# Patient Record
Sex: MALE | Race: WHITE | NOT HISPANIC OR LATINO | Employment: OTHER | URBAN - METROPOLITAN AREA
[De-identification: names, ages, dates, MRNs, and addresses within clinical notes are randomized per-mention and may not be internally consistent; named-entity substitution may affect disease eponyms.]

---

## 2018-06-15 ENCOUNTER — CONVERSION ENCOUNTER (OUTPATIENT)
Dept: OTHER | Facility: HOSPITAL | Age: 63
End: 2018-06-15

## 2018-06-15 ENCOUNTER — OFFICE VISIT CONVERTED (OUTPATIENT)
Dept: CARDIOLOGY | Facility: CLINIC | Age: 63
End: 2018-06-15
Attending: SPECIALIST

## 2018-06-19 ENCOUNTER — CONVERSION ENCOUNTER (OUTPATIENT)
Dept: CARDIOLOGY | Facility: CLINIC | Age: 63
End: 2018-06-19
Attending: SPECIALIST

## 2018-11-20 ENCOUNTER — HOSPITAL ENCOUNTER (OUTPATIENT)
Dept: PREADMISSION TESTING | Facility: HOSPITAL | Age: 63
Discharge: HOME | End: 2018-11-20
Attending: UROLOGY | Admitting: UROLOGY

## 2018-11-20 LAB
ANION GAP SERPL CALC-SCNC: 13.4 MMOL/L (ref 10–20)
BASOPHILS # BLD AUTO: 0 10*3/UL (ref 0–0.2)
BASOPHILS NFR BLD AUTO: 1 % (ref 0–2)
BUN SERPL-MCNC: 15 MG/DL (ref 8–20)
BUN/CREAT SERPL: 13.6 (ref 6.2–20.3)
CALCIUM SERPL-MCNC: 9.2 MG/DL (ref 8.9–10.3)
CHLORIDE SERPL-SCNC: 99 MMOL/L (ref 101–111)
CONV CO2: 26 MMOL/L (ref 22–32)
CREAT UR-MCNC: 1.1 MG/DL (ref 0.7–1.2)
DIFFERENTIAL METHOD BLD: (no result)
EOSINOPHIL # BLD AUTO: 0.3 10*3/UL (ref 0–0.3)
EOSINOPHIL # BLD AUTO: 4 % (ref 0–3)
ERYTHROCYTE [DISTWIDTH] IN BLOOD BY AUTOMATED COUNT: 14.1 % (ref 11.5–14.5)
GLUCOSE SERPL-MCNC: 95 MG/DL (ref 65–99)
HCT VFR BLD AUTO: 48.5 % (ref 40–54)
HGB BLD-MCNC: 16.3 G/DL (ref 14–18)
LYMPHOCYTES # BLD AUTO: 2.1 10*3/UL (ref 0.8–4.8)
LYMPHOCYTES NFR BLD AUTO: 27 % (ref 18–42)
MCH RBC QN AUTO: 32.5 PG (ref 26–32)
MCHC RBC AUTO-ENTMCNC: 33.6 G/DL (ref 32–36)
MCV RBC AUTO: 96.7 FL (ref 80–94)
MONOCYTES # BLD AUTO: 0.8 10*3/UL (ref 0.1–1.3)
MONOCYTES NFR BLD AUTO: 10 % (ref 2–11)
NEUTROPHILS # BLD AUTO: 4.7 10*3/UL (ref 2.3–8.6)
NEUTROPHILS NFR BLD AUTO: 58 % (ref 50–75)
NRBC BLD AUTO-RTO: 0 /100{WBCS}
NRBC/RBC NFR BLD MANUAL: 0 10*3/UL
PLATELET # BLD AUTO: 209 10*3/UL (ref 150–450)
PMV BLD AUTO: 9.4 FL (ref 7.4–10.4)
POTASSIUM SERPL-SCNC: 4.4 MMOL/L (ref 3.6–5.1)
RBC # BLD AUTO: 5.02 10*6/UL (ref 4.6–6)
SODIUM SERPL-SCNC: 134 MMOL/L (ref 136–144)
WBC # BLD AUTO: 8 10*3/UL (ref 4.5–11.5)

## 2019-01-25 ENCOUNTER — CONVERSION ENCOUNTER (OUTPATIENT)
Dept: OTHER | Facility: HOSPITAL | Age: 64
End: 2019-01-25

## 2019-01-25 ENCOUNTER — OFFICE VISIT CONVERTED (OUTPATIENT)
Dept: CARDIOLOGY | Facility: CLINIC | Age: 64
End: 2019-01-25
Attending: SPECIALIST

## 2019-07-02 ENCOUNTER — HOSPITAL ENCOUNTER (OUTPATIENT)
Dept: OTHER | Facility: HOSPITAL | Age: 64
Discharge: HOME | End: 2019-07-02
Attending: FAMILY MEDICINE

## 2019-08-09 ENCOUNTER — OFFICE VISIT CONVERTED (OUTPATIENT)
Dept: CARDIOLOGY | Facility: CLINIC | Age: 64
End: 2019-08-09
Attending: SPECIALIST

## 2019-08-09 ENCOUNTER — CONVERSION ENCOUNTER (OUTPATIENT)
Dept: CARDIOLOGY | Facility: CLINIC | Age: 64
End: 2019-08-09

## 2019-08-16 ENCOUNTER — HOSPITAL ENCOUNTER (OUTPATIENT)
Dept: CARDIOLOGY | Facility: HOSPITAL | Age: 64
Discharge: HOME | End: 2019-08-16
Attending: SPECIALIST

## 2019-10-10 ENCOUNTER — HOSPITAL ENCOUNTER (OUTPATIENT)
Dept: CT IMAGING | Facility: HOSPITAL | Age: 64
Discharge: HOME | End: 2019-10-10
Attending: SPECIALIST

## 2019-10-10 LAB
CREAT BLD-MCNC: 1.2 MG/DL (ref 0.6–1.4)
GFR SERPLBLD BASED ON 1.73 SQ M-ARVRAT: >60 ML/MIN/{1.73_M2}

## 2019-10-14 ENCOUNTER — HOSPITAL ENCOUNTER (OUTPATIENT)
Dept: CARDIOLOGY | Facility: HOSPITAL | Age: 64
Discharge: HOME | End: 2019-10-14
Attending: SURGERY

## 2019-12-09 ENCOUNTER — HOSPITAL ENCOUNTER (OUTPATIENT)
Dept: CARDIOLOGY | Facility: HOSPITAL | Age: 64
Discharge: HOME | End: 2019-12-09
Attending: SURGERY

## 2020-03-06 ENCOUNTER — CONVERSION ENCOUNTER (OUTPATIENT)
Dept: OTHER | Facility: HOSPITAL | Age: 65
End: 2020-03-06

## 2020-03-06 ENCOUNTER — OFFICE VISIT CONVERTED (OUTPATIENT)
Dept: CARDIOLOGY | Facility: CLINIC | Age: 65
End: 2020-03-06
Attending: SPECIALIST

## 2020-04-21 ENCOUNTER — TELEPHONE CONVERTED (OUTPATIENT)
Dept: GASTROENTEROLOGY | Facility: CLINIC | Age: 65
End: 2020-04-21
Attending: NURSE PRACTITIONER

## 2020-07-06 ENCOUNTER — HOSPITAL ENCOUNTER (OUTPATIENT)
Dept: LAB | Facility: HOSPITAL | Age: 65
Discharge: HOME | End: 2020-07-06
Attending: NURSE PRACTITIONER

## 2020-07-06 LAB
BASOPHILS # BLD AUTO: 0.07 10*3/UL (ref 0–0.2)
BASOPHILS NFR BLD AUTO: 0.8 % (ref 0–3)
CONV ABS IMM GRAN: 0.03 10*3/UL (ref 0–0.2)
CONV IMMATURE GRAN: 0.3 % (ref 0–1.8)
DEPRECATED RDW RBC AUTO: 48.7 FL (ref 35.1–43.9)
EOSINOPHIL # BLD AUTO: 0.46 10*3/UL (ref 0–0.7)
EOSINOPHIL # BLD AUTO: 5 % (ref 0–7)
ERYTHROCYTE [DISTWIDTH] IN BLOOD BY AUTOMATED COUNT: 14.3 % (ref 11.6–14.4)
HCT VFR BLD AUTO: 50.5 % (ref 42–52)
HGB BLD-MCNC: 16.9 G/DL (ref 14–18)
LYMPHOCYTES # BLD AUTO: 2.7 10*3/UL (ref 1–5)
LYMPHOCYTES NFR BLD AUTO: 29.4 % (ref 20–45)
MCH RBC QN AUTO: 31.1 PG (ref 27–31)
MCHC RBC AUTO-ENTMCNC: 33.5 G/DL (ref 33–37)
MCV RBC AUTO: 93 FL (ref 80–96)
MONOCYTES # BLD AUTO: 0.85 10*3/UL (ref 0.2–1.2)
MONOCYTES NFR BLD AUTO: 9.3 % (ref 3–10)
NEUTROPHILS # BLD AUTO: 5.06 10*3/UL (ref 2–8)
NEUTROPHILS NFR BLD AUTO: 55.2 % (ref 30–85)
NRBC CBCN: 0 % (ref 0–0.7)
PLATELET # BLD AUTO: 192 10*3/UL (ref 130–400)
PMV BLD AUTO: 12.5 FL (ref 9.4–12.4)
RBC # BLD AUTO: 5.43 10*6/UL (ref 4.7–6.1)
WBC # BLD AUTO: 9.17 10*3/UL (ref 4.8–10.8)

## 2020-07-08 LAB — BACTERIA SPEC AEROBE CULT: NORMAL

## 2020-07-17 ENCOUNTER — HOSPITAL ENCOUNTER (OUTPATIENT)
Dept: PREADMISSION TESTING | Facility: HOSPITAL | Age: 65
Discharge: HOME | End: 2020-07-17
Attending: INTERNAL MEDICINE

## 2020-07-18 LAB — SARS-COV-2 RNA SPEC QL NAA+PROBE: NOT DETECTED

## 2020-07-22 ENCOUNTER — HOSPITAL ENCOUNTER (OUTPATIENT)
Dept: GASTROENTEROLOGY | Facility: HOSPITAL | Age: 65
Setting detail: HOSPITAL OUTPATIENT SURGERY
Discharge: HOME | End: 2020-07-22
Attending: INTERNAL MEDICINE

## 2021-01-08 ENCOUNTER — OFFICE VISIT CONVERTED (OUTPATIENT)
Dept: CARDIOLOGY | Facility: CLINIC | Age: 66
End: 2021-01-08
Attending: SPECIALIST

## 2021-05-12 NOTE — PROGRESS NOTES
"   Quick Note      Patient Name: Yehuda Weaver   Patient ID: [id number]   Sex: Male   YOB: 1955    Primary Care Provider: Merna MOORE   Referring Provider: Alva MOORE    Visit Date: April 21, 2020    Provider: OSCAR Grossman   Location: [facility]   Location Address: [address] [place] [address]  [id number]   Location Phone: [phone]          History Of Present Illness  TELEHEALTH TELEPHONE VISIT  Chief Complaint: Pt states it feels like there is something stuck in his throat. He states he has had his throat stretched. He states at night his sinuses drain. Dr. Hernandez sent him to a ent and was told he couldn't find a cause for concern.   Yehuda Weaver is a 64 year old /White male who is presenting for evaluation via telehealth telephone visit. Verbal consent obtained before beginning visit.   Provider spent 16 minutes with the patient during telehealth visit.   The following staff were present during this visit: Leandra Pack MA, Shannan Hall MA   Past Medical History/Overview of Patient Symptoms     Due to the national emergency of COVID-19, all visits are being performed via telehealth where possible.    New pt w c/o feeling as if there's a lump in his throat and it feels sore, states he saw ENT and meds adjusted but no help. States feels \"sore\" when he pushes on throat, states he had an US of neck and negative, states he gets relief with hot water in shower on neck. Pt denies dysphagia w pills, solids, or liquids. Denies pain w swallowing except if he drinks a large drink of water this can be painful. Denies HB or indigestion, except he feels if he eats late or lays down after eating. Noted pt taking Omeprazole 40 mg/day, has been on x 2 yrs and prior to that was on Protonix.  No unint wt loss.   Pt states his bowels are moving normally, he states his stool has been darker than normal the last 2 days-+black. Denies NSAIDs, but on " Rx  mg/day. Sees Dr Nicole.    Last EGD colonoscopy with Dr. Castano October 2015 showed hiatal hernia, Schatzki's ring, 2 small polyps removedadenomatous.           Assessment  · Melena     578.1/K92.1  · Globus sensation     306.4/R09.89  · History of colon polyps     V12.72/Z86.010  · Heartburn     787.1/R12      Plan  · Orders  o CBC with Auto Diff [facility] (04051) - - 04/21/2020  o Physician Telephone Evaluation, 11-20 minutes (70996) - - 04/21/2020  o Stool occult blood screen by immunoassay (15087) - - 04/21/2020  · Medications  o NuLYTELY with Flavor Packs 420 gram oral recon soln   SIG: take as directed for 1 day per office instructions   DISP: (1) 4000 ml bottle with 0 refills  Prescribed on 04/21/2020     o Medications have been Reconciled  o Transition of Care or Provider Policy  · Instructions  o Please Sign Permit for: EGD/COLONOSCOPY Indication: persist HB, globus sensation, last colon 2015 + polyps Surgical Risk and Benefits: Possible risks/complications, benefits, and alternatives to surgical or invasive procedure have been explained to patient and/or legal guardian; Patient has been evaluated and can tolerate anesthesia and/or sedation. Risks, benefits, and alternatives to anesthesia and sedation have been explained to patient and/or legal guardian. Cardiac Clearance Dr Nicole  o small frequent meals r/w pt, NPO 4 hrs before HS            Electronically Signed by: OSCAR Grossman -Author on April 21, 2020 02:17:28 PM

## 2021-05-14 VITALS
DIASTOLIC BLOOD PRESSURE: 79 MMHG | HEIGHT: 67 IN | BODY MASS INDEX: 31.39 KG/M2 | WEIGHT: 200 LBS | HEART RATE: 69 BPM | SYSTOLIC BLOOD PRESSURE: 127 MMHG

## 2021-05-14 NOTE — PROGRESS NOTES
"   Progress Note      Patient Name: Yehuda Weaver   Patient ID: [id number]   Sex: Male   YOB: 1955    Primary Care Provider: Koby Hernandez MD    Visit Date: January 8, 2021    Provider: Rodolfo Nicole MD   Location: [facility]   Location Address: [address] [place] [address]  [id number]   Location Phone: [phone]          Chief Complaint     Coronary artery disease.  Hypertension.  Musculoskeletal chest pain.       History Of Present Illness  Yehuda Weaver is a 65 year old /White male with a history of coronary artery disease and history of coronary artery bypass graft surgery with nonspecific chest pain, appears to be musculoskeletal. No shortness of breath. No PND. No orthopnea.   CURRENT MEDICATIONS: Cartia  mg daily; atorvastatin 80 mg daily; metoprolol 100 mg daily; lisinopril-hydrochlorothiazide 20-25 mg daily; doxazosin 4 mg daily; aspirin 325 mg daily; omeprazole 40 mg daily; fish oil 1200 mg b.i.d.; multivitamin daily; montelukast 10 mg daily.   PAST MEDICAL HISTORY: Coronary artery disease status post CABG; Hyperlipidemia; Hypertension; Peripheral vascular disease.   PSYCHOSOCIAL HISTORY: Current smoker. Denies alcohol use.      ALLERGIES:  Penicillin.       Review of Systems  · Cardiovascular  o Admits  o : shortness of breath while walking or lying flat, chest pain or angina pectoris   o Denies  o : palpitations (fast, fluttering, or skipping beats), swelling (feet, ankles, hands)  · Respiratory  o Denies  o : chronic or frequent cough, asthma or wheezing      Vitals  Date Time BP Position Site L\R Cuff Size HR RR TEMP (F) WT  HT  BMI kg/m2 BSA m2 O2 Sat FR L/min FiO2 HC       01/08/2021 12:05 /79 Sitting    69 - R   200lbs 0oz 5'  7\" 31.32 2.07             Physical Examination  · Constitutional  o Appearance  o : Awake, alert, cooperative, pleasant.  · Respiratory  o Inspection of Chest  o : No chest wall deformities, moving " equal.  o Auscultation of Lungs  o : Good air entry with vesicular breath sounds.  · Cardiovascular  o Heart  o :   § Auscultation of Heart  § : S1 and S2 regular. No S3. No S4.   o Peripheral Vascular System  o :   § Extremities  § : Peripheral pulses were well felt. No edema. No cyanosis.  · Gastrointestinal  o Abdominal Examination  o : No masses or organomegaly noted.          Assessment     ASSESSMENT & PLAN:    1.  Coronary artery disease status post coronary artery bypass graft surgery, stable.  Continue aspirin.  2.  Essential hypertension, controlled.  Continue lisinopril and metoprolol.  3.  Hyperlipidemia.  Continue Lipitor.  Managed by Dr. Hernandez.    4.  Positive nicotine use.  Smoking cessation instructions were discussed with the patient.  5.  Peripheral vascular disease.  Managed by his vascular surgeon.  6.  See me back in 6 months.             Electronically Signed by: Nely Huerta-, Other -Author on January 11, 2021 11:30:27 AM  Electronically Co-signed by: Rodolfo Nicole MD -Reviewer on January 11, 2021 12:15:24 PM

## 2021-05-15 VITALS
HEART RATE: 72 BPM | DIASTOLIC BLOOD PRESSURE: 75 MMHG | BODY MASS INDEX: 29.03 KG/M2 | WEIGHT: 185 LBS | SYSTOLIC BLOOD PRESSURE: 130 MMHG | HEIGHT: 67 IN

## 2021-05-15 VITALS
SYSTOLIC BLOOD PRESSURE: 126 MMHG | DIASTOLIC BLOOD PRESSURE: 74 MMHG | HEART RATE: 74 BPM | WEIGHT: 19 LBS | HEIGHT: 67 IN | BODY MASS INDEX: 2.98 KG/M2

## 2021-05-15 VITALS
DIASTOLIC BLOOD PRESSURE: 74 MMHG | SYSTOLIC BLOOD PRESSURE: 125 MMHG | HEIGHT: 67 IN | HEART RATE: 72 BPM | BODY MASS INDEX: 29.66 KG/M2 | WEIGHT: 189 LBS

## 2021-05-16 VITALS
BODY MASS INDEX: 28.88 KG/M2 | DIASTOLIC BLOOD PRESSURE: 88 MMHG | WEIGHT: 184 LBS | HEART RATE: 67 BPM | HEIGHT: 67 IN | SYSTOLIC BLOOD PRESSURE: 137 MMHG

## 2021-08-12 NOTE — PROGRESS NOTES
[facility]  Cardiology progress Note    Patient Name: Yehuda Weaver  : 1955    CHIEF COMPLAINT  Coronary artery disease   Peripheral vascular disease       Subjective   Subjective     HISTORY OF PRESENT ILLNESS    Yehuda Weaver is a 66 y.o. male with history of coronary disease status post coronary bypass graft surgery.  Denies any chest pain.  Has bilateral peripheral vascular disease.  He has recent stenting in his iliacs.  He is claudication pain is improved.    Review of Systems:   Constitutional no fever,  no weight loss   Skin no rash   Otolaryngeal no difficulty swallowing   Cardiovascular See HPI   Pulmonary no cough, no sputum production   Gastrointestinal no constipation, no diarrhea   Genitourinary no dysuria, no hematuria   Hematologic no easy bruisability, no abnormal bleeding   Musculoskeletal no muscle pain   Neurologic no dizziness, no falls         Personal History     Social History:  reports that he has been smoking. He has never used smokeless tobacco. He reports previous alcohol use. He reports that he does not use drugs.    Home Medications:  Current Outpatient Medications on File Prior to Visit   Medication Sig   • aspirin 81 MG chewable tablet Chew 81 mg Daily.   • atorvastatin (LIPITOR) 80 MG tablet Daily.   • clopidogrel (PLAVIX) 75 MG tablet Daily.   • dilTIAZem (TIAZAC) 240 MG 24 hr capsule Daily.   • doxazosin (CARDURA) 4 MG tablet    • lisinopril-hydrochlorothiazide (PRINZIDE,ZESTORETIC) 20-25 MG per tablet    • metoprolol succinate XL (TOPROL-XL) 100 MG 24 hr tablet Daily.   • montelukast (SINGULAIR) 10 MG tablet Daily.   • omeprazole (priLOSEC) 40 MG capsule omeprazole 40 mg oral capsule,delayed release(DR/EC) take 1 capsule (40 mg) by oral route once daily before a meal in combination with clarithromycin   Active     No current facility-administered medications on file prior to visit.     Allergies:  Allergies   Allergen Reactions   • Penicillins Unknown - Low  Severity       Objective    Objective       Vitals:   Heart Rate:  [70] 70  BP: (130)/(70) 130/70  Body mass index is 30.54 kg/m².     Physical Exam:   Constitutional: Awake, alert, No acute distress    Eyes: PERRLA, sclerae anicteric, no conjunctival injection   HENT: NCAT, mucous membranes moist   Neck: Supple, no thyromegaly, no lymphadenopathy, trachea midline   Respiratory: Clear to auscultation bilaterally, nonlabored respirations    Cardiovascular: RRR, no murmurs or rubs. Palpable pedal pulses bilaterally   Gastrointestinal: Positive bowel sounds, soft, nontender, nondistended   Musculoskeletal: No bilateral ankle edema, no cyanosis to extremities   Psychiatric: Appropriate affect, cooperative   Neurologic: Oriented x 3, strength symmetric in all extremities, Cranial Nerves grossly intact to confrontation, speech clear   Skin: No rashes.    Result Review    Result Review:  I have personally reviewed the available results from  [x]  Laboratory  [x]  EKG  [x]  Cardiology  [x]  Medications  [x]  Old records  []  Other:   Procedures      Impression/Plan:  1.  Coronary artery disease, s/p CABG surgery, stable:  Recent stress test was negative.  Continue current dose of aspirin and Metoprolol.  2.  Essential hypertension controlled:  Continue Lisinopril.  3.  Hyperlipidemia:  Continue Lipitor.  4.  Peripheral vascular disease recent PTCA/stent:  Followed up by vascular surgeon in [place].  Continue aspirin and Plavix.  5.  Positive for nicotine use:  Smoking-cessation instructions were discussed with the patient.

## 2021-08-13 ENCOUNTER — OFFICE VISIT (OUTPATIENT)
Dept: CARDIOLOGY | Facility: CLINIC | Age: 66
End: 2021-08-13

## 2021-08-13 VITALS
WEIGHT: 195 LBS | HEIGHT: 67 IN | HEART RATE: 70 BPM | DIASTOLIC BLOOD PRESSURE: 70 MMHG | SYSTOLIC BLOOD PRESSURE: 130 MMHG | BODY MASS INDEX: 30.61 KG/M2

## 2021-08-13 DIAGNOSIS — Z95.1 HX OF CABG: ICD-10-CM

## 2021-08-13 DIAGNOSIS — I25.10 CORONARY ARTERY DISEASE INVOLVING NATIVE CORONARY ARTERY OF NATIVE HEART WITHOUT ANGINA PECTORIS: Primary | ICD-10-CM

## 2021-08-13 DIAGNOSIS — E78.2 HYPERLIPEMIA, MIXED: ICD-10-CM

## 2021-08-13 DIAGNOSIS — Z72.0 NICOTINE USE: ICD-10-CM

## 2021-08-13 PROCEDURE — 99214 OFFICE O/P EST MOD 30 MIN: CPT | Performed by: SPECIALIST

## 2021-08-13 RX ORDER — ATORVASTATIN CALCIUM 80 MG/1
80 TABLET, FILM COATED ORAL NIGHTLY
COMMUNITY
Start: 2021-07-14

## 2021-08-13 RX ORDER — CLOPIDOGREL BISULFATE 75 MG/1
TABLET ORAL DAILY
COMMUNITY
Start: 2021-05-28 | End: 2021-10-27

## 2021-08-13 RX ORDER — MONTELUKAST SODIUM 10 MG/1
10 TABLET ORAL NIGHTLY
COMMUNITY
Start: 2021-08-08

## 2021-08-13 RX ORDER — METOPROLOL SUCCINATE 100 MG/1
100 TABLET, EXTENDED RELEASE ORAL DAILY
COMMUNITY
Start: 2021-05-27

## 2021-08-13 RX ORDER — ASPIRIN 81 MG/1
81 TABLET, CHEWABLE ORAL DAILY
COMMUNITY
End: 2023-01-25

## 2021-08-13 RX ORDER — OMEPRAZOLE 40 MG/1
40 CAPSULE, DELAYED RELEASE ORAL DAILY
COMMUNITY

## 2021-08-13 RX ORDER — LISINOPRIL AND HYDROCHLOROTHIAZIDE 25; 20 MG/1; MG/1
1 TABLET ORAL DAILY
COMMUNITY
Start: 2021-08-08

## 2021-08-13 RX ORDER — DILTIAZEM HYDROCHLORIDE 240 MG/1
240 CAPSULE, EXTENDED RELEASE ORAL DAILY
COMMUNITY
Start: 2021-07-14

## 2021-08-13 RX ORDER — DOXAZOSIN MESYLATE 4 MG/1
4 TABLET ORAL NIGHTLY
COMMUNITY
Start: 2021-06-09

## 2021-10-27 ENCOUNTER — LAB (OUTPATIENT)
Dept: LAB | Facility: HOSPITAL | Age: 66
End: 2021-10-27

## 2021-10-27 ENCOUNTER — OFFICE VISIT (OUTPATIENT)
Dept: GASTROENTEROLOGY | Facility: CLINIC | Age: 66
End: 2021-10-27

## 2021-10-27 VITALS
BODY MASS INDEX: 31.33 KG/M2 | SYSTOLIC BLOOD PRESSURE: 146 MMHG | HEART RATE: 84 BPM | TEMPERATURE: 97.4 F | DIASTOLIC BLOOD PRESSURE: 77 MMHG | HEIGHT: 67 IN | WEIGHT: 199.6 LBS

## 2021-10-27 DIAGNOSIS — R10.32 LLQ PAIN: Primary | ICD-10-CM

## 2021-10-27 DIAGNOSIS — K59.00 CONSTIPATION, UNSPECIFIED CONSTIPATION TYPE: ICD-10-CM

## 2021-10-27 DIAGNOSIS — R10.32 LLQ PAIN: ICD-10-CM

## 2021-10-27 DIAGNOSIS — K57.90 DIVERTICULOSIS: ICD-10-CM

## 2021-10-27 PROBLEM — I10 HYPERTENSION: Status: ACTIVE | Noted: 2021-10-27

## 2021-10-27 PROBLEM — K57.92 DIVERTICULITIS: Status: ACTIVE | Noted: 2021-10-27

## 2021-10-27 PROBLEM — E78.5 HYPERLIPIDEMIA: Status: ACTIVE | Noted: 2021-10-27

## 2021-10-27 PROBLEM — J44.9 CHRONIC OBSTRUCTIVE PULMONARY DISEASE (HCC): Status: ACTIVE | Noted: 2021-10-27

## 2021-10-27 PROBLEM — I25.10 CORONARY ARTERY DISEASE: Status: ACTIVE | Noted: 2021-10-27

## 2021-10-27 LAB
DEPRECATED RDW RBC AUTO: 44.7 FL (ref 37–54)
ERYTHROCYTE [DISTWIDTH] IN BLOOD BY AUTOMATED COUNT: 13.1 % (ref 12.3–15.4)
HCT VFR BLD AUTO: 49.7 % (ref 37.5–51)
HGB BLD-MCNC: 16.1 G/DL (ref 13–17.7)
MCH RBC QN AUTO: 30 PG (ref 26.6–33)
MCHC RBC AUTO-ENTMCNC: 32.4 G/DL (ref 31.5–35.7)
MCV RBC AUTO: 92.6 FL (ref 79–97)
PLATELET # BLD AUTO: 201 10*3/MM3 (ref 140–450)
PMV BLD AUTO: 12.7 FL (ref 6–12)
RBC # BLD AUTO: 5.37 10*6/MM3 (ref 4.14–5.8)
WBC # BLD AUTO: 8.28 10*3/MM3 (ref 3.4–10.8)

## 2021-10-27 PROCEDURE — 36415 COLL VENOUS BLD VENIPUNCTURE: CPT

## 2021-10-27 PROCEDURE — 85027 COMPLETE CBC AUTOMATED: CPT

## 2021-10-27 PROCEDURE — 99214 OFFICE O/P EST MOD 30 MIN: CPT | Performed by: NURSE PRACTITIONER

## 2021-10-27 RX ORDER — FLUTICASONE PROPIONATE 50 MCG
1 SPRAY, SUSPENSION (ML) NASAL NIGHTLY
COMMUNITY
Start: 2021-09-15

## 2021-10-27 RX ORDER — CHLORAL HYDRATE 500 MG
1000 CAPSULE ORAL
COMMUNITY

## 2021-10-27 RX ORDER — DIPHENOXYLATE HYDROCHLORIDE AND ATROPINE SULFATE 2.5; .025 MG/1; MG/1
1 TABLET ORAL DAILY
COMMUNITY

## 2021-10-27 NOTE — PATIENT INSTRUCTIONS
Miralax this week, then start Benefiber and colace daily-2xday. Call in 10 days w update, if no improvement then repeat scope.

## 2021-10-27 NOTE — PROGRESS NOTES
Patient Name: Yehuda Weaver   Visit Date: 10/27/2021   Patient ID: [id number]  Provider: OSCAR Argueta    Sex: male  Location:  Location Address:  Location Phone: [phone] [place] [address] [phone]    YOB: 1955  Age: 66 y.o.   Primary Care Provider Koby Hernandez MD      Referring Provider: No ref. provider found        Chief Complaint  LLQ pain (Pt states since he had scope last year and he has been having abd pain), Constipation (Pt states he does not have bm daily), and Diarrhea (Pt states he may have 2 bm in the am)    History of Present Illness  Patient initially presented April 2020 with globus sensation.  EGD colonoscopy 7/22/2020: Small hiatal hernia, normal esophagus-biopsy negative, normal stomach and duodenum, good prep, diverticula in the sigmoid colon, 3 mm benign polyp in the descending colon, grade 1 internal hemorrhoids    Pt states globus sensation has resolved. Pt has not been seen since initial visit/scopes. No HB w Prilosec.  Pt has been having LLQ pain started 2 weeks after colonoscopy 7/2020. Saw urology, in [place], states had CT 2-3 months ago and was normal. Pt states he's having small stools, sometimes runny, and sometimes normal. [place] #4 usually, but currently #2-3 and has BM most days. Has not tried any meds. No blood in stool, no black stool.   Cardio - Dr Nicole  Past Medical History:   Diagnosis Date   • Bladder cancer (HCC) 2004   • Coronary artery disease    • Diverticulitis    • Heart attack (HCC)    • Hyperlipidemia    • Hypertension        Past Surgical History:   Procedure Laterality Date   • BACK SURGERY     • COLONOSCOPY  07/22/2020    Dr. Castano   • CORONARY ARTERY BYPASS GRAFT  2007    Triple coronary bypass   • HERNIA REPAIR     • PROSTATE SURGERY     • UPPER GASTROINTESTINAL ENDOSCOPY  07/22/2020    Dr. Castano       Allergies   Allergen Reactions   • Penicillins Unknown - Low Severity       Family History   Problem Relation  "Age of Onset   • Lung cancer Brother         Unsure of age   • Heart failure Mother    • Clotting disorder Father    • Heart attack Father    • Colon cancer Neg Hx         Social History     Tobacco Use   • Smoking status: Current Every Day Smoker   • Smokeless tobacco: Never Used   Vaping Use   • Vaping Use: Never used   Substance Use Topics   • Alcohol use: Not Currently   • Drug use: Never       Objective     Vital Signs:   /77 (BP Location: Left arm, Patient Position: Sitting, Cuff Size: Adult)   Pulse 84   Temp 97.4 °F (36.3 °C) (Temporal)   Ht 170.2 cm (67\")   Wt 90.5 kg (199 lb 9.6 oz)   BMI 31.26 kg/m²       Physical Exam  Constitutional:       General: The patient is not in acute distress.     Appearance: Normal appearance.   HENT:      Head: Normocephalic and atraumatic.      Nose: Nose normal.   Pulmonary:      Effort: Pulmonary effort is normal. No respiratory distress.   Abdominal:      General: Abdomen is flat.      Palpations: Abdomen is soft. There is no mass.      Tenderness: There is no abdominal tenderness except TENDER IN LLQ. There is no guarding.   Musculoskeletal:      Cervical back: Neck supple.      Right lower leg: No edema.      Left lower leg: No edema.   Skin:     General: Skin is warm and dry.   Neurological:      General: No focal deficit present.      Mental Status: The patient is alert and oriented to person, place, and time.      Gait: Gait normal.   Psychiatric:         Mood and Affect: Mood normal.         Speech: Speech normal.         Behavior: Behavior normal.         Thought Content: Thought content normal.     Result Review :   The following data was reviewed by: OSCAR Argueta on 10/27/2021:              Assessment and Plan    Diagnoses and all orders for this visit:    1. LLQ pain (Primary)  -     CBC (No Diff); Future            Follow Up      Check CBC today, requesting copy of CT scan  Miralax this week, then start Benefiber and colace " daily-2xday. Call in 10 days w update, if no improvement then repeat scope.  Discussed with patient long-term risk of PPI therapy, recommended decreasing dose today and patient was agreeable.  He will try to take every other day and gradually taper.     Patient was given instructions and counseling regarding his condition or for health maintenance advice. Please see specific information pulled into the AVS if appropriate.

## 2021-10-28 ENCOUNTER — TELEPHONE (OUTPATIENT)
Dept: GASTROENTEROLOGY | Facility: CLINIC | Age: 66
End: 2021-10-28

## 2021-10-28 NOTE — TELEPHONE ENCOUNTER
Called [facility] at [phone] and spoke to Parvin. Asked her to fax our office this patient's CT results to [phone].

## 2022-02-10 NOTE — PROGRESS NOTES
[facility]  Cardiology progress Note    Patient Name: Yehuda Weaver  : 1955    CHIEF COMPLAINT  Coronary artery disease.      Subjective   Subjective     HISTORY OF PRESENT ILLNESS    Yehuda Weaver is a 66 y.o. male with history of coronary s/p CABG.  No  shortness of breath.  He has peripheral vascular disease s/p stents.  He has some nonspecific nonexertional chest pain which lasts for a few minutes.    Review of Systems:   Constitutional no fever,  no weight loss   Skin no rash   Otolaryngeal no difficulty swallowing   Cardiovascular See HPI   Pulmonary no cough, no sputum production   Gastrointestinal no constipation, no diarrhea   Genitourinary no dysuria, no hematuria   Hematologic no easy bruisability, no abnormal bleeding   Musculoskeletal no muscle pain   Neurologic no dizziness, no falls          Personal History     Social History:  reports that he has been smoking. He has been smoking about 1.00 pack per day. He has never used smokeless tobacco. He reports previous alcohol use. He reports that he does not use drugs.    Home Medications:  Current Outpatient Medications on File Prior to Visit   Medication Sig   • aspirin 81 MG chewable tablet Chew 81 mg Daily.   • atorvastatin (LIPITOR) 80 MG tablet Daily.   • dilTIAZem (TIAZAC) 240 MG 24 hr capsule Daily.   • doxazosin (CARDURA) 4 MG tablet    • fluticasone (FLONASE) 50 MCG/ACT nasal spray    • lisinopril-hydrochlorothiazide (PRINZIDE,ZESTORETIC) 20-25 MG per tablet    • metoprolol succinate XL (TOPROL-XL) 100 MG 24 hr tablet Daily.   • montelukast (SINGULAIR) 10 MG tablet Daily.   • multivitamin (MULTI-VITAMIN DAILY PO)    • Omega-3 Fatty Acids (fish oil) 1000 MG capsule capsule    • omeprazole (priLOSEC) 40 MG capsule omeprazole 40 mg oral capsule,delayed release(DR/EC) take 1 capsule (40 mg) by oral route once daily before a meal in combination with clarithromycin   Active     No current facility-administered medications on file  prior to visit.     Allergies:  Allergies   Allergen Reactions   • Penicillins Unknown - Low Severity       Objective    Objective       Vitals:   Heart Rate:  [63] 63  BP: (139)/(74) 139/74  Body mass index is 29.6 kg/m².     Physical Exam:   Constitutional: Awake, alert, No acute distress    Eyes: PERRLA, sclerae anicteric, no conjunctival injection   HENT: NCAT, mucous membranes moist   Neck: Supple, no thyromegaly, no lymphadenopathy, trachea midline   Respiratory: Clear to auscultation bilaterally, nonlabored respirations    Cardiovascular: RRR, no murmurs or rubs. Palpable pedal pulses bilaterally   Musculoskeletal: No bilateral ankle edema, no cyanosis to extremities   Psychiatric: Appropriate affect, cooperative   Neurologic: Oriented x 3, strength symmetric in all extremities, Cranial Nerves grossly intact to confrontation, speech clear   Skin: No rashes.    Result Review    Result Review:  I have personally reviewed the available results from  [x]  Laboratory  [x]  EKG  [x]  Cardiology  [x]  Medications  [x]  Old records  []  Other:     ECG 12 Lead    Date/Time: 2/11/2022 10:25 AM  Performed by: Rodolfo Nicole MD  Authorized by: Rodolfo Nicole MD   Comparison: compared with previous ECG   Similar to previous ECG  Rhythm: sinus rhythm    Clinical impression: abnormal EKG  Comments: Normal sinus rhythm.  No significant changes compared to previous EKG.            Impression/Plan:  1.  Coronary artery s/p CABG stable: Continue aspirin 81 mg a day.  Continue Toprol- mg once a day.  2.  Essential hypertension controlled: Continue Prinzide 20/25 mg once a day.  Continue trazodone 40 mg a day.  Blood pressure controlled at home.  3.  Hyperlipidemia: Continue Lipitor 80 mg once a day.  4.  Peripheral vascular disease stable: Continue aspirin 81 mg a day.  5.  Atypical chest pain: In view of his previous history of coronary disease we will do a sestamibi stress test to evaluate for any significant  ischemia.  6.  Positive nicotine use: Smoking cessation discussed with patient.        Rodolfo Nicole MD   02/11/22   10:04 EST

## 2022-02-11 ENCOUNTER — OFFICE VISIT (OUTPATIENT)
Dept: CARDIOLOGY | Facility: CLINIC | Age: 67
End: 2022-02-11

## 2022-02-11 VITALS
HEIGHT: 67 IN | BODY MASS INDEX: 29.66 KG/M2 | SYSTOLIC BLOOD PRESSURE: 139 MMHG | DIASTOLIC BLOOD PRESSURE: 74 MMHG | WEIGHT: 189 LBS | HEART RATE: 63 BPM

## 2022-02-11 DIAGNOSIS — R07.9 CHEST PAIN, UNSPECIFIED TYPE: ICD-10-CM

## 2022-02-11 DIAGNOSIS — Z72.0 NICOTINE USE: ICD-10-CM

## 2022-02-11 DIAGNOSIS — Z95.1 HX OF CABG: ICD-10-CM

## 2022-02-11 DIAGNOSIS — E78.2 HYPERLIPEMIA, MIXED: ICD-10-CM

## 2022-02-11 DIAGNOSIS — I25.10 CORONARY ARTERY DISEASE INVOLVING NATIVE CORONARY ARTERY OF NATIVE HEART WITHOUT ANGINA PECTORIS: Primary | ICD-10-CM

## 2022-02-11 PROCEDURE — 99214 OFFICE O/P EST MOD 30 MIN: CPT | Performed by: SPECIALIST

## 2022-02-11 PROCEDURE — 93000 ELECTROCARDIOGRAM COMPLETE: CPT | Performed by: SPECIALIST

## 2022-02-11 NOTE — PATIENT INSTRUCTIONS
Managing the Challenge of Quitting Smoking  Quitting smoking is a physical and mental challenge. You will face cravings, withdrawal symptoms, and temptation. Before quitting, work with your health care provider to make a plan that can help you manage quitting. Preparation can help you quit and keep you from giving in.  How to manage lifestyle changes  Managing stress  Stress can make you want to smoke, and wanting to smoke may cause stress. It is important to find ways to manage your stress. You might try some of the following:  · Practice relaxation techniques.  ? Breathe slowly and deeply, in through your nose and out through your mouth.  ? Listen to music.  ? Soak in a bath or take a shower.  ? Imagine a peaceful place or vacation.  · Get some support.  ? Talk with family or friends about your stress.  ? Join a support group.  ? Talk with a counselor or therapist.  · Get some physical activity.  ? Go for a walk, run, or bike ride.  ? Play a favorite sport.  ? Practice yoga.    Medicines  Talk with your health care provider about medicines that might help you deal with cravings and make quitting easier for you.  Relationships  Social situations can be difficult when you are quitting smoking. To manage this, you can:  · Avoid parties and other social situations where people might be smoking.  · Avoid alcohol.  · Leave right away if you have the urge to smoke.  · Explain to your family and friends that you are quitting smoking. Ask for support and let them know you might be a bit grumpy.  · Plan activities where smoking is not an option.  General instructions  Be aware that many people gain weight after they quit smoking. However, not everyone does. To keep from gaining weight, have a plan in place before you quit and stick to the plan after you quit. Your plan should include:  · Having healthy snacks. When you have a craving, it may help to:  ? Eat popcorn, carrots, celery, or other cut vegetables.  ? Chew  sugar-free gum.  · Changing how you eat.  ? Eat small portion sizes at meals.  ? Eat 4-6 small meals throughout the day instead of 1-2 large meals a day.  ? Be mindful when you eat. Do not watch television or do other things that might distract you as you eat.  · Exercising regularly.  ? Make time to exercise each day. If you do not have time for a long workout, do short bouts of exercise for 5-10 minutes several times a day.  ? Do some form of strengthening exercise, such as weight lifting.  ? Do some exercise that gets your heart beating and causes you to breathe deeply, such as walking fast, running, swimming, or biking. This is very important.  · Drinking plenty of water or other low-calorie or no-calorie drinks. Drink 6-8 glasses of water daily.    How to recognize withdrawal symptoms  Your body and mind may experience discomfort as you try to get used to not having nicotine in your system. These effects are called withdrawal symptoms. They may include:  · Feeling hungrier than normal.  · Having trouble concentrating.  · Feeling irritable or restless.  · Having trouble sleeping.  · Feeling depressed.  · Craving a cigarette.  To manage withdrawal symptoms:  · Avoid places, people, and activities that trigger your cravings.  · Remember why you want to quit.  · Get plenty of sleep.  · Avoid coffee and other caffeinated drinks. These may worsen some of your symptoms.  These symptoms may surprise you. But be assured that they are normal to have when quitting smoking.  How to manage cravings  Come up with a plan for how to deal with your cravings. The plan should include the following:  · A definition of the specific situation you want to deal with.  · An alternative action you will take.  · A clear idea for how this action will help.  · The name of someone who might help you with this.  Cravings usually last for 5-10 minutes. Consider taking the following actions to help you with your plan to deal with  cravings:  · Keep your mouth busy.  ? Chew sugar-free gum.  ? Suck on hard candies or a straw.  ? Brush your teeth.  · Keep your hands and body busy.  ? Change to a different activity right away.  ? Squeeze or play with a ball.  ? Do an activity or a hobby, such as making bead jewelry, practicing needlepoint, or working with wood.  ? Mix up your normal routine.  ? Take a short exercise break. Go for a quick walk or run up and down stairs.  · Focus on doing something kind or helpful for someone else.  · Call a friend or family member to talk during a craving.  · Join a support group.  · Contact a quitline.  Where to find support  To get help or find a support group:  · Call the National [employer]'s Smoking Quitline: 7-242-QUIT NOW (740-5229)  · Visit the website of the Substance Abuse and Mental Health Services Administration: www.samhsa.gov  · Text QUIT to SmokefreeTXT: 313939  Where to find more information  Visit these websites to find more information on quitting smoking:  · [employer] [place]: www.smokefree.gov  · American Lung Association: www.lung.org  · American Cancer Society: www.cancer.org  · Centers for Disease Control and Prevention: www.cdc.gov  · American Heart Association: www.heart.org  Contact a health care provider if:  · You want to change your plan for quitting.  · The medicines you are taking are not helping.  · Your eating feels out of control or you cannot sleep.  Get help right away if:  · You feel depressed or become very anxious.  Summary  · Quitting smoking is a physical and mental challenge. You will face cravings, withdrawal symptoms, and temptation to smoke again. Preparation can help you as you go through these challenges.  · Try different techniques to manage stress, handle social situations, and prevent weight gain.  · You can deal with cravings by keeping your mouth busy (such as by chewing gum), keeping your hands and body busy, calling family or friends, or  contacting a quitline for people who want to quit smoking.  · You can deal with withdrawal symptoms by avoiding places where people smoke, getting plenty of rest, and avoiding drinks with caffeine.  This information is not intended to replace advice given to you by your health care provider. Make sure you discuss any questions you have with your health care provider.  Document Revised: 10/06/2020 Document Reviewed: 10/06/2020  Elsevier Patient Education © 2021 [employer]

## 2022-02-23 ENCOUNTER — TRANSCRIBE ORDERS (OUTPATIENT)
Dept: ADMINISTRATIVE | Facility: HOSPITAL | Age: 67
End: 2022-02-23

## 2022-02-23 DIAGNOSIS — I70.223 ATHEROSCLEROSIS OF NATIVE ARTERY OF BOTH LOWER EXTREMITIES WITH REST PAIN: Primary | ICD-10-CM

## 2022-02-24 ENCOUNTER — APPOINTMENT (OUTPATIENT)
Dept: CT IMAGING | Facility: HOSPITAL | Age: 67
End: 2022-02-24

## 2022-02-28 ENCOUNTER — HOSPITAL ENCOUNTER (OUTPATIENT)
Dept: CT IMAGING | Facility: HOSPITAL | Age: 67
Discharge: HOME | End: 2022-02-28
Admitting: SURGERY

## 2022-02-28 DIAGNOSIS — I70.223 ATHEROSCLEROSIS OF NATIVE ARTERY OF BOTH LOWER EXTREMITIES WITH REST PAIN: ICD-10-CM

## 2022-02-28 LAB — CREAT BLDA-MCNC: 0.8 MG/DL (ref 0.6–1.3)

## 2022-02-28 PROCEDURE — 0 IOPAMIDOL PER 1 ML: Performed by: SURGERY

## 2022-02-28 PROCEDURE — 82565 ASSAY OF CREATININE: CPT

## 2022-02-28 PROCEDURE — 75635 CT ANGIO ABDOMINAL ARTERIES: CPT

## 2022-02-28 RX ADMIN — IOPAMIDOL 100 ML: 755 INJECTION, SOLUTION INTRAVENOUS at 09:07

## 2022-03-02 ENCOUNTER — APPOINTMENT (OUTPATIENT)
Dept: NUCLEAR MEDICINE | Facility: HOSPITAL | Age: 67
End: 2022-03-02

## 2022-06-15 ENCOUNTER — HOSPITAL ENCOUNTER (INPATIENT)
Facility: HOSPITAL | Age: 67
LOS: 3 days | Discharge: HOME | End: 2022-06-19
Attending: EMERGENCY MEDICINE | Admitting: STUDENT IN AN ORGANIZED HEALTH CARE EDUCATION/TRAINING PROGRAM

## 2022-06-15 ENCOUNTER — APPOINTMENT (OUTPATIENT)
Dept: CT IMAGING | Facility: HOSPITAL | Age: 67
End: 2022-06-15

## 2022-06-15 DIAGNOSIS — T82.868A: Primary | ICD-10-CM

## 2022-06-15 DIAGNOSIS — I70.213 ATHEROSCLEROSIS OF NATIVE ARTERIES OF EXTREMITIES WITH INTERMITTENT CLAUDICATION, BILATERAL LEGS: Chronic | ICD-10-CM

## 2022-06-15 LAB
ALBUMIN SERPL-MCNC: 4.3 G/DL (ref 3.5–5.2)
ALBUMIN/GLOB SERPL: 1.8 G/DL
ALP SERPL-CCNC: 87 U/L (ref 39–117)
ALT SERPL W P-5'-P-CCNC: 38 U/L (ref 1–41)
ANION GAP SERPL CALCULATED.3IONS-SCNC: 13.6 MMOL/L (ref 5–15)
APTT PPP: 26 SECONDS (ref 22.7–35.4)
AST SERPL-CCNC: 25 U/L (ref 1–40)
BASOPHILS # BLD AUTO: 0.06 10*3/MM3 (ref 0–0.2)
BASOPHILS NFR BLD AUTO: 0.5 % (ref 0–1.5)
BILIRUB SERPL-MCNC: 0.3 MG/DL (ref 0–1.2)
BUN SERPL-MCNC: 15 MG/DL (ref 8–23)
BUN/CREAT SERPL: 17.6 (ref 7–25)
CALCIUM SPEC-SCNC: 9.1 MG/DL (ref 8.6–10.5)
CHLORIDE SERPL-SCNC: 104 MMOL/L (ref 98–107)
CO2 SERPL-SCNC: 22.4 MMOL/L (ref 22–29)
CREAT SERPL-MCNC: 0.85 MG/DL (ref 0.76–1.27)
DEPRECATED RDW RBC AUTO: 44.2 FL (ref 37–54)
EGFRCR SERPLBLD CKD-EPI 2021: 95.2 ML/MIN/1.73
EOSINOPHIL # BLD AUTO: 0.37 10*3/MM3 (ref 0–0.4)
EOSINOPHIL NFR BLD AUTO: 3.1 % (ref 0.3–6.2)
ERYTHROCYTE [DISTWIDTH] IN BLOOD BY AUTOMATED COUNT: 13.7 % (ref 12.3–15.4)
GLOBULIN UR ELPH-MCNC: 2.4 GM/DL
GLUCOSE SERPL-MCNC: 77 MG/DL (ref 65–99)
HCT VFR BLD AUTO: 47.6 % (ref 37.5–51)
HGB BLD-MCNC: 16.1 G/DL (ref 13–17.7)
HOLD SPECIMEN: NORMAL
IMM GRANULOCYTES # BLD AUTO: 0.02 10*3/MM3 (ref 0–0.05)
IMM GRANULOCYTES NFR BLD AUTO: 0.2 % (ref 0–0.5)
INR PPP: 0.98 (ref 0.9–1.1)
LYMPHOCYTES # BLD AUTO: 3.18 10*3/MM3 (ref 0.7–3.1)
LYMPHOCYTES NFR BLD AUTO: 26.7 % (ref 19.6–45.3)
MCH RBC QN AUTO: 30.2 PG (ref 26.6–33)
MCHC RBC AUTO-ENTMCNC: 33.8 G/DL (ref 31.5–35.7)
MCV RBC AUTO: 89.3 FL (ref 79–97)
MONOCYTES # BLD AUTO: 1.15 10*3/MM3 (ref 0.1–0.9)
MONOCYTES NFR BLD AUTO: 9.6 % (ref 5–12)
NEUTROPHILS NFR BLD AUTO: 59.9 % (ref 42.7–76)
NEUTROPHILS NFR BLD AUTO: 7.14 10*3/MM3 (ref 1.7–7)
NRBC BLD AUTO-RTO: 0 /100 WBC (ref 0–0.2)
PLATELET # BLD AUTO: 188 10*3/MM3 (ref 140–450)
PMV BLD AUTO: 12 FL (ref 6–12)
POTASSIUM SERPL-SCNC: 4.1 MMOL/L (ref 3.5–5.2)
PROT SERPL-MCNC: 6.7 G/DL (ref 6–8.5)
PROTHROMBIN TIME: 12.9 SECONDS (ref 11.7–14.2)
RBC # BLD AUTO: 5.33 10*6/MM3 (ref 4.14–5.8)
SARS-COV-2 RNA RESP QL NAA+PROBE: NOT DETECTED
SODIUM SERPL-SCNC: 140 MMOL/L (ref 136–145)
WBC NRBC COR # BLD: 11.92 10*3/MM3 (ref 3.4–10.8)
WHOLE BLOOD HOLD COAG: NORMAL
WHOLE BLOOD HOLD SPECIMEN: NORMAL

## 2022-06-15 PROCEDURE — U0005 INFEC AGEN DETEC AMPLI PROBE: HCPCS | Performed by: PHYSICIAN ASSISTANT

## 2022-06-15 PROCEDURE — 99284 EMERGENCY DEPT VISIT MOD MDM: CPT

## 2022-06-15 PROCEDURE — 75635 CT ANGIO ABDOMINAL ARTERIES: CPT

## 2022-06-15 PROCEDURE — 85610 PROTHROMBIN TIME: CPT | Performed by: PHYSICIAN ASSISTANT

## 2022-06-15 PROCEDURE — 25010000002 HEPARIN (PORCINE) 25000-0.45 UT/250ML-% SOLUTION: Performed by: SURGERY

## 2022-06-15 PROCEDURE — 85025 COMPLETE CBC W/AUTO DIFF WBC: CPT | Performed by: PHYSICIAN ASSISTANT

## 2022-06-15 PROCEDURE — G0378 HOSPITAL OBSERVATION PER HR: HCPCS

## 2022-06-15 PROCEDURE — 80053 COMPREHEN METABOLIC PANEL: CPT | Performed by: PHYSICIAN ASSISTANT

## 2022-06-15 PROCEDURE — 85730 THROMBOPLASTIN TIME PARTIAL: CPT | Performed by: PHYSICIAN ASSISTANT

## 2022-06-15 PROCEDURE — 0 IOPAMIDOL PER 1 ML: Performed by: EMERGENCY MEDICINE

## 2022-06-15 PROCEDURE — 25010000002 HEPARIN (PORCINE) PER 1000 UNITS: Performed by: SURGERY

## 2022-06-15 PROCEDURE — U0003 INFECTIOUS AGENT DETECTION BY NUCLEIC ACID (DNA OR RNA); SEVERE ACUTE RESPIRATORY SYNDROME CORONAVIRUS 2 (SARS-COV-2) (CORONAVIRUS DISEASE [COVID-19]), AMPLIFIED PROBE TECHNIQUE, MAKING USE OF HIGH THROUGHPUT TECHNOLOGIES AS DESCRIBED BY CMS-2020-01-R: HCPCS | Performed by: PHYSICIAN ASSISTANT

## 2022-06-15 PROCEDURE — 25010000002 MORPHINE PER 10 MG: Performed by: EMERGENCY MEDICINE

## 2022-06-15 RX ORDER — PANTOPRAZOLE SODIUM 40 MG/1
40 TABLET, DELAYED RELEASE ORAL EVERY MORNING
Status: DISCONTINUED | OUTPATIENT
Start: 2022-06-16 | End: 2022-06-19 | Stop reason: HOSPADM

## 2022-06-15 RX ORDER — DILTIAZEM HYDROCHLORIDE 240 MG/1
240 CAPSULE, COATED, EXTENDED RELEASE ORAL
Status: DISCONTINUED | OUTPATIENT
Start: 2022-06-16 | End: 2022-06-19 | Stop reason: HOSPADM

## 2022-06-15 RX ORDER — METOPROLOL SUCCINATE 100 MG/1
100 TABLET, EXTENDED RELEASE ORAL
Status: DISCONTINUED | OUTPATIENT
Start: 2022-06-16 | End: 2022-06-19 | Stop reason: HOSPADM

## 2022-06-15 RX ORDER — TERAZOSIN 5 MG/1
5 CAPSULE ORAL NIGHTLY
Status: DISCONTINUED | OUTPATIENT
Start: 2022-06-16 | End: 2022-06-19 | Stop reason: HOSPADM

## 2022-06-15 RX ORDER — MORPHINE SULFATE 2 MG/ML
4 INJECTION, SOLUTION INTRAMUSCULAR; INTRAVENOUS ONCE
Status: COMPLETED | OUTPATIENT
Start: 2022-06-15 | End: 2022-06-15

## 2022-06-15 RX ORDER — HEPARIN SODIUM 5000 [USP'U]/ML
30-47.4 INJECTION, SOLUTION INTRAVENOUS; SUBCUTANEOUS EVERY 6 HOURS PRN
Status: DISCONTINUED | OUTPATIENT
Start: 2022-06-15 | End: 2022-06-17

## 2022-06-15 RX ORDER — SODIUM CHLORIDE 0.9 % (FLUSH) 0.9 %
10 SYRINGE (ML) INJECTION EVERY 12 HOURS SCHEDULED
Status: DISCONTINUED | OUTPATIENT
Start: 2022-06-16 | End: 2022-06-19 | Stop reason: HOSPADM

## 2022-06-15 RX ORDER — HEPARIN SODIUM 10000 [USP'U]/100ML
11.8 INJECTION, SOLUTION INTRAVENOUS
Status: DISCONTINUED | OUTPATIENT
Start: 2022-06-15 | End: 2022-06-17

## 2022-06-15 RX ORDER — HYDROCODONE BITARTRATE AND ACETAMINOPHEN 5; 325 MG/1; MG/1
1 TABLET ORAL EVERY 4 HOURS PRN
Status: DISCONTINUED | OUTPATIENT
Start: 2022-06-15 | End: 2022-06-19 | Stop reason: HOSPADM

## 2022-06-15 RX ORDER — ATORVASTATIN CALCIUM 80 MG/1
80 TABLET, FILM COATED ORAL DAILY
Status: DISCONTINUED | OUTPATIENT
Start: 2022-06-16 | End: 2022-06-19 | Stop reason: HOSPADM

## 2022-06-15 RX ORDER — SODIUM CHLORIDE, SODIUM LACTATE, POTASSIUM CHLORIDE, CALCIUM CHLORIDE 600; 310; 30; 20 MG/100ML; MG/100ML; MG/100ML; MG/100ML
75 INJECTION, SOLUTION INTRAVENOUS CONTINUOUS
Status: DISCONTINUED | OUTPATIENT
Start: 2022-06-15 | End: 2022-06-18

## 2022-06-15 RX ORDER — SODIUM CHLORIDE 0.9 % (FLUSH) 0.9 %
10 SYRINGE (ML) INJECTION AS NEEDED
Status: DISCONTINUED | OUTPATIENT
Start: 2022-06-15 | End: 2022-06-19 | Stop reason: HOSPADM

## 2022-06-15 RX ORDER — MORPHINE SULFATE 2 MG/ML
2 INJECTION, SOLUTION INTRAMUSCULAR; INTRAVENOUS
Status: DISCONTINUED | OUTPATIENT
Start: 2022-06-15 | End: 2022-06-16

## 2022-06-15 RX ADMIN — HEPARIN SODIUM 12 UNITS/KG/HR: 10000 INJECTION, SOLUTION INTRAVENOUS at 22:30

## 2022-06-15 RX ADMIN — IOPAMIDOL 95 ML: 755 INJECTION, SOLUTION INTRAVENOUS at 18:24

## 2022-06-15 RX ADMIN — SODIUM CHLORIDE, POTASSIUM CHLORIDE, SODIUM LACTATE AND CALCIUM CHLORIDE 75 ML/HR: 600; 310; 30; 20 INJECTION, SOLUTION INTRAVENOUS at 22:29

## 2022-06-15 RX ADMIN — HEPARIN SODIUM 4000 UNITS: 5000 INJECTION INTRAVENOUS; SUBCUTANEOUS at 22:30

## 2022-06-15 RX ADMIN — MORPHINE SULFATE 4 MG: 2 INJECTION, SOLUTION INTRAMUSCULAR; INTRAVENOUS at 17:39

## 2022-06-15 NOTE — ED PROVIDER NOTES
Pt presents to the ED c/o  acute onset of right lower leg pain, numbness, tingling started earlier today.  Patient with a history of vascular stents placed in the leg earlier this year.  Denies any direct trauma to the area, currently on aspirin.     On exam,   General: No acute distress, nontoxic  HEENT: Mucous membranes moist, atraumatic, EOMI  Neck: Full ROM  Pulm: Symmetric chest rise, nonlabored  Cardiovascular: Regular rate and rhythm, no significant discoloration right distal leg as compared to the left, no significant coolness to touch as compared to the left.  Unable to palpate a dorsalis pedis or PT pulse, delayed capillary refill noted compared to the left  GI: Soft, nontender, nondistended, no rebound, no guarding, bowel sounds present  MSK: Full ROM, no deformity  Skin: Warm, dry  Neuro: Awake, alert, oriented x 4, GCS 15, moving all extremities, no focal deficits  Psych: Calm, cooperative      N95, protective eye goggles, and gloves used during this encounter. Patient in surgical mask.      Plan: DOTTIE was able to get a Doppler DP but not PT pulse, plan for CT angio with runoff out of concern for possible vascular occlusion.  Will reevaluate with those results with likely vascular surgery consult.    Patient showing the vascular occlusions as noted on the CTA, vascular surgery has been consulted and will likely take for thrombolysis tomorrow.  Heparin has been initiated.       Attestation:  The DOTTIE and I have discussed this patient's history, physical exam, and treatment plan.  I have reviewed the documentation and personally had a face to face interaction with the patient. I affirm the documentation and agree with the treatment and plan.  The attached note describes my personal findings.            Emil Narayanan MD  06/15/22 9092

## 2022-06-15 NOTE — ED TRIAGE NOTES
Pt c/o right posterior leg pain that started 2.5hrs ago. Pt went to [facility] in [facility] and told to follow up with md. Pt called md and was told to come here. Reports swelling to right lower leg. No recent travel.      Pt wearing mask on arrival. Staff wearing mask and goggles at time of triage.

## 2022-06-15 NOTE — ED PROVIDER NOTES
EMERGENCY DEPARTMENT ENCOUNTER    Room Number: 08/08  Date seen: 6/15/2022  Time seen: 18:12 EDT  PCP: Koby Hernandez MD    Spoken Language:  English  Language interpretation services not needed     CHIEF COMPLAINT: Right leg pain    Vascular surgeon: Donna Bean Jr MD    HPI: Yehuda Weaver is a 67 y.o. male, on aspirin 81 mg daily, with PMH of PAD (s/p stenting), HTN, HLD and prostate cancer presenting to the ED for evaluation of right leg pain. The history is being obtained by the patient and by review of the medical chart.  The patient states that he began noticing pain in the right lower extremity with ambulating last night.  He states that this morning he began having numbness in the toes of the right foot at approximately 9 AM.  He states that this numbness has now progressed to pain involving all of the toes in the right foot.  He also complains of pain which radiates into the posterior right lower extremity anytime he tries to ambulate, even short distances, today.  He rates his current pain a 7/10 in severity.  It is constant.  The patient denies missing any doses of his aspirin.  He states that he was initially also on Plavix following stent placement, but that the Plavix was discontinued.    MEDICAL RECORD REVIEW:  Reviewed in truedash.     PAST MEDICAL HISTORY  Past Medical History:   Diagnosis Date   • Bladder cancer (HCC) 2004   • Coronary artery disease    • Diverticulitis    • Heart attack (HCC)    • Hyperlipidemia    • Hypertension        PAST SURGICAL HISTORY  Past Surgical History:   Procedure Laterality Date   • BACK SURGERY     • COLONOSCOPY  07/22/2020    Dr. Castano   • CORONARY ARTERY BYPASS GRAFT  2007    Triple coronary bypass   • HERNIA REPAIR     • PROSTATE SURGERY     • UPPER GASTROINTESTINAL ENDOSCOPY  07/22/2020    Dr. Castano       FAMILY HISTORY  Family History   Problem Relation Age of Onset   • Lung cancer Brother         Unsure of age   • Heart failure Mother    • Clotting  disorder Father    • Heart attack Father    • Colon cancer Neg Hx        SOCIAL HISTORY  Social History     Socioeconomic History   • Marital status:    Tobacco Use   • Smoking status: Current Every Day Smoker     Packs/day: 1.00   • Smokeless tobacco: Never Used   Vaping Use   • Vaping Use: Never used   Substance and Sexual Activity   • Alcohol use: Not Currently   • Drug use: Never   • Sexual activity: Defer       CURRENT MEDICATIONS  Prior to Admission medications    Medication Sig Start Date End Date Taking? Authorizing Provider   aspirin 81 MG chewable tablet Chew 81 mg Daily.    Siria Oreilly MD   atorvastatin (LIPITOR) 80 MG tablet Daily. 7/14/21   Siria Oreilly MD   dilTIAZem (TIAZAC) 240 MG 24 hr capsule Daily. 7/14/21   Siria Oreilly MD   doxazosin (CARDURA) 4 MG tablet  6/9/21   Siria Oreilly MD   fluticasone (FLONASE) 50 MCG/ACT nasal spray  9/15/21   Siria Oreilly MD   lisinopril-hydrochlorothiazide (PRINZIDE,ZESTORETIC) 20-25 MG per tablet  8/8/21   Siria Oreilly MD   metoprolol succinate XL (TOPROL-XL) 100 MG 24 hr tablet Daily. 5/27/21   Siria Oreilly MD   montelukast (SINGULAIR) 10 MG tablet Daily. 8/8/21   Siria Oreilly MD   multivitamin (MULTI-VITAMIN DAILY PO)     Siria Oreilly MD   Omega-3 Fatty Acids (fish oil) 1000 MG capsule capsule     Siria Oreilly MD   omeprazole (priLOSEC) 40 MG capsule omeprazole 40 mg oral capsule,delayed release(DR/EC) take 1 capsule (40 mg) by oral route once daily before a meal in combination with clarithromycin   Active    Siria Oreilly MD       ALLERGIES  Penicillins    REVIEW OF SYSTEMS  All systems reviewed and negative except for those discussed in HPI.     PHYSICAL EXAM  ED Triage Vitals   Temp Heart Rate Resp BP SpO2   06/15/22 1604 06/15/22 1604 06/15/22 1604 06/15/22 1631 06/15/22 1604   96.7 °F (35.9 °C) 66 18 124/73 98 %      Temp src Heart Rate Source Patient  Position BP Location FiO2 (%)   06/15/22 1604 06/15/22 1604 -- -- --   Tympanic Monitor          Physical Exam  Constitutional:       Appearance: Normal appearance.   HENT:      Head: Normocephalic and atraumatic.      Mouth/Throat:      Mouth: Mucous membranes are moist.   Eyes:      Extraocular Movements: Extraocular movements intact.      Pupils: Pupils are equal, round, and reactive to light.   Cardiovascular:      Rate and Rhythm: Normal rate and regular rhythm.   Pulmonary:      Effort: Pulmonary effort is normal.      Breath sounds: Normal breath sounds.   Abdominal:      General: There is no distension.      Palpations: Abdomen is soft.      Tenderness: There is no abdominal tenderness.   Musculoskeletal:      Cervical back: Normal range of motion.   Skin:     Comments: Unable to palpate a dorsalis pedis or posterior tibialis pulse in the right foot.  I was able to easily find a dorsalis pedis pulse with Doppler ultrasound, but was unable to find a dorsalis pedis pulse on the right.  The patient has delayed capillary refill to the toes in the right foot.  The foot is warm.   Neurological:      General: No focal deficit present.      Mental Status: He is alert and oriented to person, place, and time.   Psychiatric:         Mood and Affect: Mood normal.         Behavior: Behavior normal.         Thought Content: Thought content normal.         Judgment: Judgment normal.         PROCEDURES  Procedures  None    LABS  Recent Results (from the past 24 hour(s))   Green Top (Gel)    Collection Time: 06/15/22  4:30 PM   Result Value Ref Range    Extra Tube Hold for add-ons.    Lavender Top    Collection Time: 06/15/22  4:30 PM   Result Value Ref Range    Extra Tube hold for add-on    Light Blue Top    Collection Time: 06/15/22  4:30 PM   Result Value Ref Range    Extra Tube Hold for add-ons.    Comprehensive Metabolic Panel    Collection Time: 06/15/22  4:30 PM    Specimen: Blood   Result Value Ref Range    Glucose  77 65 - 99 mg/dL    BUN 15 8 - 23 mg/dL    Creatinine 0.85 0.76 - 1.27 mg/dL    Sodium 140 136 - 145 mmol/L    Potassium 4.1 3.5 - 5.2 mmol/L    Chloride 104 98 - 107 mmol/L    CO2 22.4 22.0 - 29.0 mmol/L    Calcium 9.1 8.6 - 10.5 mg/dL    Total Protein 6.7 6.0 - 8.5 g/dL    Albumin 4.30 3.50 - 5.20 g/dL    ALT (SGPT) 38 1 - 41 U/L    AST (SGOT) 25 1 - 40 U/L    Alkaline Phosphatase 87 39 - 117 U/L    Total Bilirubin 0.3 0.0 - 1.2 mg/dL    Globulin 2.4 gm/dL    A/G Ratio 1.8 g/dL    BUN/Creatinine Ratio 17.6 7.0 - 25.0    Anion Gap 13.6 5.0 - 15.0 mmol/L    eGFR 95.2 >60.0 mL/min/1.73   CBC Auto Differential    Collection Time: 06/15/22  4:30 PM    Specimen: Blood   Result Value Ref Range    WBC 11.92 (H) 3.40 - 10.80 10*3/mm3    RBC 5.33 4.14 - 5.80 10*6/mm3    Hemoglobin 16.1 13.0 - 17.7 g/dL    Hematocrit 47.6 37.5 - 51.0 %    MCV 89.3 79.0 - 97.0 fL    MCH 30.2 26.6 - 33.0 pg    MCHC 33.8 31.5 - 35.7 g/dL    RDW 13.7 12.3 - 15.4 %    RDW-SD 44.2 37.0 - 54.0 fl    MPV 12.0 6.0 - 12.0 fL    Platelets 188 140 - 450 10*3/mm3    Neutrophil % 59.9 42.7 - 76.0 %    Lymphocyte % 26.7 19.6 - 45.3 %    Monocyte % 9.6 5.0 - 12.0 %    Eosinophil % 3.1 0.3 - 6.2 %    Basophil % 0.5 0.0 - 1.5 %    Immature Grans % 0.2 0.0 - 0.5 %    Neutrophils, Absolute 7.14 (H) 1.70 - 7.00 10*3/mm3    Lymphocytes, Absolute 3.18 (H) 0.70 - 3.10 10*3/mm3    Monocytes, Absolute 1.15 (H) 0.10 - 0.90 10*3/mm3    Eosinophils, Absolute 0.37 0.00 - 0.40 10*3/mm3    Basophils, Absolute 0.06 0.00 - 0.20 10*3/mm3    Immature Grans, Absolute 0.02 0.00 - 0.05 10*3/mm3    nRBC 0.0 0.0 - 0.2 /100 WBC   Protime-INR    Collection Time: 06/15/22  7:43 PM    Specimen: Arm, Right; Blood   Result Value Ref Range    Protime 12.9 11.7 - 14.2 Seconds    INR 0.98 0.90 - 1.10   aPTT    Collection Time: 06/15/22  7:43 PM    Specimen: Arm, Right; Blood   Result Value Ref Range    PTT 26.0 22.7 - 35.4 seconds       RADIOLOGY  CT Angio Abdominal Aorta Bilateral Iliofem  Runoff   Final Result       1. Occlusion of the right superficial femoral artery stent, with thready   opacification of the arteries of the right lower leg by collateral flow.   Areas of arterial narrowing, as detailed above.   2. Incidental findings as noted.       Discussed by telephone with Juancho Cruz at time of interpretation,   1910, 06/15/2022.       This report was finalized on 6/15/2022 7:13 PM by Dr. Vikas Appiah M.D.              MEDICATIONS GIVEN IN THE ER  Medications   morphine injection 4 mg (4 mg Intravenous Given 6/15/22 8307)   iopamidol (ISOVUE-370) 76 % injection 100 mL (95 mL Intravenous Given by Other 6/15/22 8632)       MEDICAL DECISION MAKING, CONSULTS AND PROGRESS NOTES  All labs and all radiology studies were have been viewed and interpreted by me.   Discussion below represents my analysis of pertinent findings related to patient's condition, differential diagnosis, treatment plan and final disposition.    Differential diagnosis includes but is not limited to:  -Occluded stent  -Peripheral artery disease  -Lumbar stenosis    PPE: The patient was placed in a face mask in first look. Patient was wearing facemask when I entered the room and throughout our encounter. I wore full protective equipment throughout this patient encounter including a face mask, eye protection and gloves. Hand hygiene was performed before donning protective equipment and after removal when leaving the room.    AS OF 20:46 EDT VITALS:    BP - 131/67  HR - 59  TEMP - 96.7 °F (35.9 °C) (Tympanic)  O2 SATS - 93%    ED Course as of 06/15/22 2046   Wed Christiano 15, 2022   2014 I discussed the patient's care with Dr. Salgado, on-call for vascular surgery.  He agrees with plan for heparin and will come to see the patient in consultation.  He anticipates that the patient will be scheduled for thrombolysis tomorrow. [AR]   2045 I discussed the patient's overall care with Dr. Jimenez with [facility].  He is agreeable to admission.  [AR]      ED Course User Index  [AR] Rossi Cruz PA     Based on the patient's lab findings and presenting symptoms, the doctor and I feel it is appropriate to admit the patient for further management, evaluation, and treatment.  I have discussed this with the admitting team.  I have also discussed this with the patient/family.  They are in agreement with admission.      DIAGNOSIS   Diagnosis Plan   1. Arterial stent thrombosis, initial encounter (HCC)      right superior femoral artery       DISPOSITION  ED Disposition     ED Disposition   Decision to Admit    Condition   --    Comment   Level of Care: Telemetry [5]   Diagnosis: Arterial stent thrombosis, initial encounter (HCC) [[id number]]   Admitting Physician: JOHANNY MOJICA [[id number]]   Attending Physician: JOHANNY MOJICA [[id number]]             RX  Medications   morphine injection 4 mg (4 mg Intravenous Given 6/15/22 2952)   iopamidol (ISOVUE-370) 76 % injection 100 mL (95 mL Intravenous Given by Other 6/15/22 4965)          Medication List      No changes were made to your prescriptions during this visit.       Critical care:  Total critical care time of 35 minutes is exclusive of any other billable procedures and includes time spent with direct patient care and observation, retrospective chart review, management of acute condition, and consultation with other medical providers.    Provider Attestation:  I personally reviewed the past medical history, past surgical history, social history, family history, current medications and allergies as they appear in the chart. I reviewed the patient's history, physical, lab/imaging results and overall care with Dr. Narayanan who is in agreement with the patient's treatment plan.    EMR Dragon/Transcription disclaimer:  Dictated using Dragon dictation    Provider note signed by:         Rossi Cruz PA  06/15/22 2041

## 2022-06-16 ENCOUNTER — ANESTHESIA EVENT (OUTPATIENT)
Dept: PERIOP | Facility: HOSPITAL | Age: 67
End: 2022-06-16

## 2022-06-16 ENCOUNTER — APPOINTMENT (OUTPATIENT)
Dept: GENERAL RADIOLOGY | Facility: HOSPITAL | Age: 67
End: 2022-06-16

## 2022-06-16 ENCOUNTER — ANESTHESIA (OUTPATIENT)
Dept: PERIOP | Facility: HOSPITAL | Age: 67
End: 2022-06-16

## 2022-06-16 PROBLEM — F17.210 TOBACCO DEPENDENCE DUE TO CIGARETTES: Status: ACTIVE | Noted: 2022-06-16

## 2022-06-16 LAB
ABO GROUP BLD: NORMAL
ANION GAP SERPL CALCULATED.3IONS-SCNC: 10 MMOL/L (ref 5–15)
ANION GAP SERPL CALCULATED.3IONS-SCNC: 8 MMOL/L (ref 5–15)
APTT PPP: 40.2 SECONDS (ref 22.7–35.4)
APTT PPP: 50.6 SECONDS (ref 22.7–35.4)
APTT PPP: 95.6 SECONDS (ref 22.7–35.4)
BASOPHILS # BLD AUTO: 0.06 10*3/MM3 (ref 0–0.2)
BASOPHILS # BLD AUTO: 0.07 10*3/MM3 (ref 0–0.2)
BASOPHILS NFR BLD AUTO: 0.5 % (ref 0–1.5)
BASOPHILS NFR BLD AUTO: 0.7 % (ref 0–1.5)
BLD GP AB SCN SERPL QL: NEGATIVE
BUN SERPL-MCNC: 10 MG/DL (ref 8–23)
BUN SERPL-MCNC: 12 MG/DL (ref 8–23)
BUN/CREAT SERPL: 13.9 (ref 7–25)
BUN/CREAT SERPL: 15.8 (ref 7–25)
CALCIUM SPEC-SCNC: 8.2 MG/DL (ref 8.6–10.5)
CALCIUM SPEC-SCNC: 8.6 MG/DL (ref 8.6–10.5)
CHLORIDE SERPL-SCNC: 104 MMOL/L (ref 98–107)
CHLORIDE SERPL-SCNC: 105 MMOL/L (ref 98–107)
CK SERPL-CCNC: 95 U/L (ref 20–200)
CO2 SERPL-SCNC: 25 MMOL/L (ref 22–29)
CO2 SERPL-SCNC: 26 MMOL/L (ref 22–29)
CREAT SERPL-MCNC: 0.72 MG/DL (ref 0.76–1.27)
CREAT SERPL-MCNC: 0.76 MG/DL (ref 0.76–1.27)
DEPRECATED RDW RBC AUTO: 42 FL (ref 37–54)
DEPRECATED RDW RBC AUTO: 43.8 FL (ref 37–54)
DEPRECATED RDW RBC AUTO: 45.7 FL (ref 37–54)
EGFRCR SERPLBLD CKD-EPI 2021: 100.1 ML/MIN/1.73
EGFRCR SERPLBLD CKD-EPI 2021: 98.5 ML/MIN/1.73
EOSINOPHIL # BLD AUTO: 0.2 10*3/MM3 (ref 0–0.4)
EOSINOPHIL # BLD AUTO: 0.39 10*3/MM3 (ref 0–0.4)
EOSINOPHIL NFR BLD AUTO: 1.6 % (ref 0.3–6.2)
EOSINOPHIL NFR BLD AUTO: 3.7 % (ref 0.3–6.2)
ERYTHROCYTE [DISTWIDTH] IN BLOOD BY AUTOMATED COUNT: 13.5 % (ref 12.3–15.4)
ERYTHROCYTE [DISTWIDTH] IN BLOOD BY AUTOMATED COUNT: 13.5 % (ref 12.3–15.4)
ERYTHROCYTE [DISTWIDTH] IN BLOOD BY AUTOMATED COUNT: 14.2 % (ref 12.3–15.4)
FIBRINOGEN PPP-MCNC: 283 MG/DL (ref 219–464)
FIBRINOGEN PPP-MCNC: 345 MG/DL (ref 219–464)
GLUCOSE SERPL-MCNC: 105 MG/DL (ref 65–99)
GLUCOSE SERPL-MCNC: 99 MG/DL (ref 65–99)
HCT VFR BLD AUTO: 45.3 % (ref 37.5–51)
HCT VFR BLD AUTO: 45.3 % (ref 37.5–51)
HCT VFR BLD AUTO: 45.4 % (ref 37.5–51)
HGB BLD-MCNC: 14.9 G/DL (ref 13–17.7)
HGB BLD-MCNC: 15.1 G/DL (ref 13–17.7)
HGB BLD-MCNC: 15.4 G/DL (ref 13–17.7)
IMM GRANULOCYTES # BLD AUTO: 0.03 10*3/MM3 (ref 0–0.05)
IMM GRANULOCYTES # BLD AUTO: 0.05 10*3/MM3 (ref 0–0.05)
IMM GRANULOCYTES NFR BLD AUTO: 0.3 % (ref 0–0.5)
IMM GRANULOCYTES NFR BLD AUTO: 0.4 % (ref 0–0.5)
INR PPP: 1.09 (ref 0.9–1.1)
LYMPHOCYTES # BLD AUTO: 1.8 10*3/MM3 (ref 0.7–3.1)
LYMPHOCYTES # BLD AUTO: 3 10*3/MM3 (ref 0.7–3.1)
LYMPHOCYTES NFR BLD AUTO: 14.2 % (ref 19.6–45.3)
LYMPHOCYTES NFR BLD AUTO: 28.1 % (ref 19.6–45.3)
MCH RBC QN AUTO: 29.4 PG (ref 26.6–33)
MCH RBC QN AUTO: 29.5 PG (ref 26.6–33)
MCH RBC QN AUTO: 29.9 PG (ref 26.6–33)
MCHC RBC AUTO-ENTMCNC: 32.9 G/DL (ref 31.5–35.7)
MCHC RBC AUTO-ENTMCNC: 33.3 G/DL (ref 31.5–35.7)
MCHC RBC AUTO-ENTMCNC: 34 G/DL (ref 31.5–35.7)
MCV RBC AUTO: 86.8 FL (ref 79–97)
MCV RBC AUTO: 89.3 FL (ref 79–97)
MCV RBC AUTO: 89.9 FL (ref 79–97)
MONOCYTES # BLD AUTO: 0.85 10*3/MM3 (ref 0.1–0.9)
MONOCYTES # BLD AUTO: 0.93 10*3/MM3 (ref 0.1–0.9)
MONOCYTES NFR BLD AUTO: 7.3 % (ref 5–12)
MONOCYTES NFR BLD AUTO: 8 % (ref 5–12)
NEUTROPHILS NFR BLD AUTO: 59.2 % (ref 42.7–76)
NEUTROPHILS NFR BLD AUTO: 6.34 10*3/MM3 (ref 1.7–7)
NEUTROPHILS NFR BLD AUTO: 76 % (ref 42.7–76)
NEUTROPHILS NFR BLD AUTO: 9.63 10*3/MM3 (ref 1.7–7)
NRBC BLD AUTO-RTO: 0 /100 WBC (ref 0–0.2)
NRBC BLD AUTO-RTO: 0 /100 WBC (ref 0–0.2)
PLATELET # BLD AUTO: 149 10*3/MM3 (ref 140–450)
PLATELET # BLD AUTO: 165 10*3/MM3 (ref 140–450)
PLATELET # BLD AUTO: 167 10*3/MM3 (ref 140–450)
PMV BLD AUTO: 11.1 FL (ref 6–12)
PMV BLD AUTO: 11.6 FL (ref 6–12)
PMV BLD AUTO: 11.8 FL (ref 6–12)
POTASSIUM SERPL-SCNC: 3.7 MMOL/L (ref 3.5–5.2)
POTASSIUM SERPL-SCNC: 3.9 MMOL/L (ref 3.5–5.2)
PROTHROMBIN TIME: 14 SECONDS (ref 11.7–14.2)
RBC # BLD AUTO: 5.05 10*6/MM3 (ref 4.14–5.8)
RBC # BLD AUTO: 5.07 10*6/MM3 (ref 4.14–5.8)
RBC # BLD AUTO: 5.22 10*6/MM3 (ref 4.14–5.8)
RH BLD: POSITIVE
SODIUM SERPL-SCNC: 139 MMOL/L (ref 136–145)
SODIUM SERPL-SCNC: 139 MMOL/L (ref 136–145)
T&S EXPIRATION DATE: NORMAL
WBC NRBC COR # BLD: 10.68 10*3/MM3 (ref 3.4–10.8)
WBC NRBC COR # BLD: 12.67 10*3/MM3 (ref 3.4–10.8)
WBC NRBC COR # BLD: 14.74 10*3/MM3 (ref 3.4–10.8)

## 2022-06-16 PROCEDURE — C1757 CATH, THROMBECTOMY/EMBOLECT: HCPCS | Performed by: SURGERY

## 2022-06-16 PROCEDURE — B41F1ZZ FLUOROSCOPY OF RIGHT LOWER EXTREMITY ARTERIES USING LOW OSMOLAR CONTRAST: ICD-10-PCS | Performed by: SURGERY

## 2022-06-16 PROCEDURE — 85610 PROTHROMBIN TIME: CPT | Performed by: SURGERY

## 2022-06-16 PROCEDURE — 75710 ARTERY X-RAYS ARM/LEG: CPT

## 2022-06-16 PROCEDURE — C1769 GUIDE WIRE: HCPCS | Performed by: SURGERY

## 2022-06-16 PROCEDURE — 25010000002 HEPARIN (PORCINE) 25000-0.45 UT/250ML-% SOLUTION: Performed by: SURGERY

## 2022-06-16 PROCEDURE — 85384 FIBRINOGEN ACTIVITY: CPT | Performed by: SURGERY

## 2022-06-16 PROCEDURE — C1894 INTRO/SHEATH, NON-LASER: HCPCS | Performed by: SURGERY

## 2022-06-16 PROCEDURE — 85347 COAGULATION TIME ACTIVATED: CPT

## 2022-06-16 PROCEDURE — 86900 BLOOD TYPING SEROLOGIC ABO: CPT | Performed by: ANESTHESIOLOGY

## 2022-06-16 PROCEDURE — C1751 CATH, INF, PER/CENT/MIDLINE: HCPCS | Performed by: SURGERY

## 2022-06-16 PROCEDURE — 85025 COMPLETE CBC W/AUTO DIFF WBC: CPT | Performed by: PHYSICIAN ASSISTANT

## 2022-06-16 PROCEDURE — 25010000002 MORPHINE PER 10 MG: Performed by: SURGERY

## 2022-06-16 PROCEDURE — 25010000002 PHENYLEPHRINE 10 MG/ML SOLUTION: Performed by: NURSE ANESTHETIST, CERTIFIED REGISTERED

## 2022-06-16 PROCEDURE — 80048 BASIC METABOLIC PNL TOTAL CA: CPT | Performed by: STUDENT IN AN ORGANIZED HEALTH CARE EDUCATION/TRAINING PROGRAM

## 2022-06-16 PROCEDURE — 85025 COMPLETE CBC W/AUTO DIFF WBC: CPT | Performed by: SURGERY

## 2022-06-16 PROCEDURE — 85730 THROMBOPLASTIN TIME PARTIAL: CPT | Performed by: SURGERY

## 2022-06-16 PROCEDURE — 25010000002 CEFAZOLIN IN DEXTROSE 2-4 GM/100ML-% SOLUTION: Performed by: SURGERY

## 2022-06-16 PROCEDURE — 25010000002 NEOSTIGMINE 5 MG/10ML SOLUTION: Performed by: NURSE ANESTHETIST, CERTIFIED REGISTERED

## 2022-06-16 PROCEDURE — 36415 COLL VENOUS BLD VENIPUNCTURE: CPT | Performed by: PHYSICIAN ASSISTANT

## 2022-06-16 PROCEDURE — 86850 RBC ANTIBODY SCREEN: CPT | Performed by: ANESTHESIOLOGY

## 2022-06-16 PROCEDURE — 25010000002 ALTEPLASE PER 1 MG: Performed by: SURGERY

## 2022-06-16 PROCEDURE — C1725 CATH, TRANSLUMIN NON-LASER: HCPCS | Performed by: SURGERY

## 2022-06-16 PROCEDURE — 25010000002 HEPARIN (PORCINE) PER 1000 UNITS: Performed by: NURSE ANESTHETIST, CERTIFIED REGISTERED

## 2022-06-16 PROCEDURE — X27H385 DILATION OF RIGHT FEMORAL ARTERY WITH SUSTAINED RELEASE DRUG-ELUTING INTRALUMINAL DEVICE, PERCUTANEOUS APPROACH, NEW TECHNOLOGY GROUP 5: ICD-10-PCS | Performed by: SURGERY

## 2022-06-16 PROCEDURE — 25010000002 FENTANYL CITRATE (PF) 50 MCG/ML SOLUTION: Performed by: NURSE ANESTHETIST, CERTIFIED REGISTERED

## 2022-06-16 PROCEDURE — 047K3DZ DILATION OF RIGHT FEMORAL ARTERY WITH INTRALUMINAL DEVICE, PERCUTANEOUS APPROACH: ICD-10-PCS | Performed by: SURGERY

## 2022-06-16 PROCEDURE — 85027 COMPLETE CBC AUTOMATED: CPT | Performed by: SURGERY

## 2022-06-16 PROCEDURE — 25010000002 ONDANSETRON PER 1 MG: Performed by: NURSE ANESTHETIST, CERTIFIED REGISTERED

## 2022-06-16 PROCEDURE — 0 IOPAMIDOL PER 1 ML: Performed by: INTERNAL MEDICINE

## 2022-06-16 PROCEDURE — C1887 CATHETER, GUIDING: HCPCS | Performed by: SURGERY

## 2022-06-16 PROCEDURE — 25010000002 PROPOFOL 10 MG/ML EMULSION: Performed by: NURSE ANESTHETIST, CERTIFIED REGISTERED

## 2022-06-16 PROCEDURE — X2CS3T7 EXTIRPATION OF MATTER FROM RIGHT LOWER EXTREMITY ARTERY USING COMPUTER-AIDED MECHANICAL ASPIRATION, PERCUTANEOUS APPROACH, NEW TECHNOLOGY GROUP 7: ICD-10-PCS | Performed by: SURGERY

## 2022-06-16 PROCEDURE — 86901 BLOOD TYPING SEROLOGIC RH(D): CPT | Performed by: ANESTHESIOLOGY

## 2022-06-16 PROCEDURE — 80048 BASIC METABOLIC PNL TOTAL CA: CPT | Performed by: SURGERY

## 2022-06-16 PROCEDURE — 25010000002 HYDROMORPHONE 1 MG/ML SOLUTION: Performed by: SURGERY

## 2022-06-16 PROCEDURE — 25010000002 HEPARIN (PORCINE) PER 1000 UNITS: Performed by: SURGERY

## 2022-06-16 PROCEDURE — 82550 ASSAY OF CK (CPK): CPT | Performed by: STUDENT IN AN ORGANIZED HEALTH CARE EDUCATION/TRAINING PROGRAM

## 2022-06-16 RX ORDER — HEPARIN SODIUM 10000 [USP'U]/100ML
INJECTION, SOLUTION INTRAVENOUS CONTINUOUS PRN
Status: COMPLETED | OUTPATIENT
Start: 2022-06-16 | End: 2022-06-16

## 2022-06-16 RX ORDER — ONDANSETRON 2 MG/ML
4 INJECTION INTRAMUSCULAR; INTRAVENOUS ONCE AS NEEDED
Status: DISCONTINUED | OUTPATIENT
Start: 2022-06-16 | End: 2022-06-16 | Stop reason: HOSPADM

## 2022-06-16 RX ORDER — ONDANSETRON 2 MG/ML
INJECTION INTRAMUSCULAR; INTRAVENOUS AS NEEDED
Status: DISCONTINUED | OUTPATIENT
Start: 2022-06-16 | End: 2022-06-16 | Stop reason: SURG

## 2022-06-16 RX ORDER — HEPARIN SODIUM 1000 [USP'U]/ML
INJECTION, SOLUTION INTRAVENOUS; SUBCUTANEOUS AS NEEDED
Status: DISCONTINUED | OUTPATIENT
Start: 2022-06-16 | End: 2022-06-16 | Stop reason: SURG

## 2022-06-16 RX ORDER — PROMETHAZINE HYDROCHLORIDE 25 MG/1
25 TABLET ORAL ONCE AS NEEDED
Status: DISCONTINUED | OUTPATIENT
Start: 2022-06-16 | End: 2022-06-16 | Stop reason: HOSPADM

## 2022-06-16 RX ORDER — FLUMAZENIL 0.1 MG/ML
0.2 INJECTION INTRAVENOUS AS NEEDED
Status: DISCONTINUED | OUTPATIENT
Start: 2022-06-16 | End: 2022-06-16 | Stop reason: HOSPADM

## 2022-06-16 RX ORDER — HYDROMORPHONE HYDROCHLORIDE 1 MG/ML
0.5 INJECTION, SOLUTION INTRAMUSCULAR; INTRAVENOUS; SUBCUTANEOUS
Status: DISCONTINUED | OUTPATIENT
Start: 2022-06-16 | End: 2022-06-16 | Stop reason: HOSPADM

## 2022-06-16 RX ORDER — ACETAMINOPHEN 650 MG/1
650 SUPPOSITORY RECTAL EVERY 4 HOURS PRN
Status: DISCONTINUED | OUTPATIENT
Start: 2022-06-16 | End: 2022-06-19 | Stop reason: HOSPADM

## 2022-06-16 RX ORDER — EPHEDRINE SULFATE 50 MG/ML
5 INJECTION, SOLUTION INTRAVENOUS ONCE AS NEEDED
Status: DISCONTINUED | OUTPATIENT
Start: 2022-06-16 | End: 2022-06-16 | Stop reason: HOSPADM

## 2022-06-16 RX ORDER — PROPOFOL 10 MG/ML
VIAL (ML) INTRAVENOUS AS NEEDED
Status: DISCONTINUED | OUTPATIENT
Start: 2022-06-16 | End: 2022-06-16 | Stop reason: SURG

## 2022-06-16 RX ORDER — NEOSTIGMINE METHYLSULFATE 0.5 MG/ML
INJECTION, SOLUTION INTRAVENOUS AS NEEDED
Status: DISCONTINUED | OUTPATIENT
Start: 2022-06-16 | End: 2022-06-16 | Stop reason: SURG

## 2022-06-16 RX ORDER — FENTANYL CITRATE 50 UG/ML
25 INJECTION, SOLUTION INTRAMUSCULAR; INTRAVENOUS
Status: DISCONTINUED | OUTPATIENT
Start: 2022-06-16 | End: 2022-06-16 | Stop reason: HOSPADM

## 2022-06-16 RX ORDER — HYDROMORPHONE HYDROCHLORIDE 1 MG/ML
0.5 INJECTION, SOLUTION INTRAMUSCULAR; INTRAVENOUS; SUBCUTANEOUS
Status: DISCONTINUED | OUTPATIENT
Start: 2022-06-16 | End: 2022-06-17

## 2022-06-16 RX ORDER — MORPHINE SULFATE 2 MG/ML
2 INJECTION, SOLUTION INTRAMUSCULAR; INTRAVENOUS
Status: DISCONTINUED | OUTPATIENT
Start: 2022-06-16 | End: 2022-06-16

## 2022-06-16 RX ORDER — DIPHENHYDRAMINE HCL 25 MG
25 CAPSULE ORAL
Status: DISCONTINUED | OUTPATIENT
Start: 2022-06-16 | End: 2022-06-16 | Stop reason: HOSPADM

## 2022-06-16 RX ORDER — CEFAZOLIN SODIUM 2 G/100ML
2 INJECTION, SOLUTION INTRAVENOUS ONCE
Status: DISCONTINUED | OUTPATIENT
Start: 2022-06-16 | End: 2022-06-16 | Stop reason: HOSPADM

## 2022-06-16 RX ORDER — ACETAMINOPHEN 325 MG/1
650 TABLET ORAL ONCE AS NEEDED
Status: DISCONTINUED | OUTPATIENT
Start: 2022-06-16 | End: 2022-06-16 | Stop reason: HOSPADM

## 2022-06-16 RX ORDER — SODIUM CHLORIDE 0.9 % (FLUSH) 0.9 %
10 SYRINGE (ML) INJECTION AS NEEDED
Status: DISCONTINUED | OUTPATIENT
Start: 2022-06-16 | End: 2022-06-16 | Stop reason: HOSPADM

## 2022-06-16 RX ORDER — ROCURONIUM BROMIDE 10 MG/ML
INJECTION, SOLUTION INTRAVENOUS AS NEEDED
Status: DISCONTINUED | OUTPATIENT
Start: 2022-06-16 | End: 2022-06-16 | Stop reason: SURG

## 2022-06-16 RX ORDER — FENTANYL CITRATE 50 UG/ML
50 INJECTION, SOLUTION INTRAMUSCULAR; INTRAVENOUS
Status: DISCONTINUED | OUTPATIENT
Start: 2022-06-16 | End: 2022-06-16 | Stop reason: HOSPADM

## 2022-06-16 RX ORDER — FAMOTIDINE 10 MG/ML
20 INJECTION, SOLUTION INTRAVENOUS
Status: COMPLETED | OUTPATIENT
Start: 2022-06-16 | End: 2022-06-16

## 2022-06-16 RX ORDER — HYDROCODONE BITARTRATE AND ACETAMINOPHEN 7.5; 325 MG/1; MG/1
1 TABLET ORAL ONCE AS NEEDED
Status: DISCONTINUED | OUTPATIENT
Start: 2022-06-16 | End: 2022-06-16 | Stop reason: HOSPADM

## 2022-06-16 RX ORDER — NALOXONE HCL 0.4 MG/ML
0.2 VIAL (ML) INJECTION AS NEEDED
Status: DISCONTINUED | OUTPATIENT
Start: 2022-06-16 | End: 2022-06-16 | Stop reason: HOSPADM

## 2022-06-16 RX ORDER — OXYCODONE AND ACETAMINOPHEN 7.5; 325 MG/1; MG/1
1 TABLET ORAL EVERY 4 HOURS PRN
Status: DISCONTINUED | OUTPATIENT
Start: 2022-06-16 | End: 2022-06-16 | Stop reason: HOSPADM

## 2022-06-16 RX ORDER — SODIUM CHLORIDE 9 MG/ML
35 INJECTION, SOLUTION INTRAVENOUS CONTINUOUS
Status: DISCONTINUED | OUTPATIENT
Start: 2022-06-16 | End: 2022-06-17

## 2022-06-16 RX ORDER — EPHEDRINE SULFATE 50 MG/ML
INJECTION, SOLUTION INTRAVENOUS AS NEEDED
Status: DISCONTINUED | OUTPATIENT
Start: 2022-06-16 | End: 2022-06-16 | Stop reason: SURG

## 2022-06-16 RX ORDER — PROMETHAZINE HYDROCHLORIDE 25 MG/1
25 SUPPOSITORY RECTAL ONCE AS NEEDED
Status: DISCONTINUED | OUTPATIENT
Start: 2022-06-16 | End: 2022-06-16 | Stop reason: HOSPADM

## 2022-06-16 RX ORDER — LABETALOL HYDROCHLORIDE 5 MG/ML
5 INJECTION, SOLUTION INTRAVENOUS
Status: DISCONTINUED | OUTPATIENT
Start: 2022-06-16 | End: 2022-06-16 | Stop reason: HOSPADM

## 2022-06-16 RX ORDER — DIPHENHYDRAMINE HYDROCHLORIDE 50 MG/ML
12.5 INJECTION INTRAMUSCULAR; INTRAVENOUS
Status: DISCONTINUED | OUTPATIENT
Start: 2022-06-16 | End: 2022-06-16 | Stop reason: HOSPADM

## 2022-06-16 RX ORDER — SODIUM CHLORIDE, SODIUM LACTATE, POTASSIUM CHLORIDE, CALCIUM CHLORIDE 600; 310; 30; 20 MG/100ML; MG/100ML; MG/100ML; MG/100ML
9 INJECTION, SOLUTION INTRAVENOUS CONTINUOUS PRN
Status: DISCONTINUED | OUTPATIENT
Start: 2022-06-16 | End: 2022-06-19 | Stop reason: HOSPADM

## 2022-06-16 RX ORDER — GLYCOPYRROLATE 0.2 MG/ML
INJECTION INTRAMUSCULAR; INTRAVENOUS AS NEEDED
Status: DISCONTINUED | OUTPATIENT
Start: 2022-06-16 | End: 2022-06-16 | Stop reason: SURG

## 2022-06-16 RX ORDER — SODIUM CHLORIDE 0.9 % (FLUSH) 0.9 %
10 SYRINGE (ML) INJECTION EVERY 12 HOURS SCHEDULED
Status: DISCONTINUED | OUTPATIENT
Start: 2022-06-16 | End: 2022-06-16 | Stop reason: HOSPADM

## 2022-06-16 RX ORDER — ACETAMINOPHEN 325 MG/1
650 TABLET ORAL EVERY 4 HOURS PRN
Status: DISCONTINUED | OUTPATIENT
Start: 2022-06-16 | End: 2022-06-19 | Stop reason: HOSPADM

## 2022-06-16 RX ORDER — MIDAZOLAM HYDROCHLORIDE 1 MG/ML
0.5 INJECTION INTRAMUSCULAR; INTRAVENOUS
Status: DISCONTINUED | OUTPATIENT
Start: 2022-06-16 | End: 2022-06-16 | Stop reason: HOSPADM

## 2022-06-16 RX ORDER — LIDOCAINE HYDROCHLORIDE 20 MG/ML
INJECTION, SOLUTION INFILTRATION; PERINEURAL AS NEEDED
Status: DISCONTINUED | OUTPATIENT
Start: 2022-06-16 | End: 2022-06-16 | Stop reason: SURG

## 2022-06-16 RX ORDER — HEPARIN SODIUM 10000 [USP'U]/100ML
500 INJECTION, SOLUTION INTRAVENOUS CONTINUOUS
Status: DISCONTINUED | OUTPATIENT
Start: 2022-06-16 | End: 2022-06-17

## 2022-06-16 RX ORDER — PHENYLEPHRINE HYDROCHLORIDE 10 MG/ML
INJECTION INTRAVENOUS AS NEEDED
Status: DISCONTINUED | OUTPATIENT
Start: 2022-06-16 | End: 2022-06-16 | Stop reason: SURG

## 2022-06-16 RX ORDER — FENTANYL CITRATE 50 UG/ML
INJECTION, SOLUTION INTRAMUSCULAR; INTRAVENOUS AS NEEDED
Status: DISCONTINUED | OUTPATIENT
Start: 2022-06-16 | End: 2022-06-16 | Stop reason: SURG

## 2022-06-16 RX ORDER — HYDRALAZINE HYDROCHLORIDE 20 MG/ML
5 INJECTION INTRAMUSCULAR; INTRAVENOUS
Status: DISCONTINUED | OUTPATIENT
Start: 2022-06-16 | End: 2022-06-16 | Stop reason: HOSPADM

## 2022-06-16 RX ADMIN — FENTANYL CITRATE 25 MCG: 50 INJECTION INTRAMUSCULAR; INTRAVENOUS at 12:23

## 2022-06-16 RX ADMIN — FENTANYL CITRATE 25 MCG: 50 INJECTION INTRAMUSCULAR; INTRAVENOUS at 12:56

## 2022-06-16 RX ADMIN — EPHEDRINE SULFATE 10 MG: 50 INJECTION INTRAVENOUS at 13:16

## 2022-06-16 RX ADMIN — NEOSTIGMINE METHYLSULFATE 2 MG: 0.5 INJECTION INTRAVENOUS at 13:45

## 2022-06-16 RX ADMIN — IOPAMIDOL 46 ML: 510 INJECTION, SOLUTION INTRAVASCULAR at 13:48

## 2022-06-16 RX ADMIN — LIDOCAINE HYDROCHLORIDE 100 MG: 20 INJECTION, SOLUTION INFILTRATION; PERINEURAL at 12:25

## 2022-06-16 RX ADMIN — PHENYLEPHRINE HYDROCHLORIDE 100 MCG: 10 INJECTION INTRAVENOUS at 13:16

## 2022-06-16 RX ADMIN — HYDROCODONE BITARTRATE AND ACETAMINOPHEN 1 TABLET: 5; 325 TABLET ORAL at 17:20

## 2022-06-16 RX ADMIN — METOPROLOL SUCCINATE 100 MG: 100 TABLET, EXTENDED RELEASE ORAL at 08:15

## 2022-06-16 RX ADMIN — HYDROMORPHONE HYDROCHLORIDE 1 MG: 1 INJECTION, SOLUTION INTRAMUSCULAR; INTRAVENOUS; SUBCUTANEOUS at 20:35

## 2022-06-16 RX ADMIN — SODIUM CHLORIDE, POTASSIUM CHLORIDE, SODIUM LACTATE AND CALCIUM CHLORIDE 9 ML/HR: 600; 310; 30; 20 INJECTION, SOLUTION INTRAVENOUS at 12:09

## 2022-06-16 RX ADMIN — HEPARIN SODIUM 5000 UNITS: 1000 INJECTION INTRAVENOUS; SUBCUTANEOUS at 12:53

## 2022-06-16 RX ADMIN — PHENYLEPHRINE HYDROCHLORIDE 100 MCG: 10 INJECTION INTRAVENOUS at 12:32

## 2022-06-16 RX ADMIN — ONDANSETRON 4 MG: 2 INJECTION INTRAMUSCULAR; INTRAVENOUS at 13:42

## 2022-06-16 RX ADMIN — PHENYLEPHRINE HYDROCHLORIDE 100 MCG: 10 INJECTION INTRAVENOUS at 12:50

## 2022-06-16 RX ADMIN — SODIUM CHLORIDE, POTASSIUM CHLORIDE, SODIUM LACTATE AND CALCIUM CHLORIDE 75 ML/HR: 600; 310; 30; 20 INJECTION, SOLUTION INTRAVENOUS at 22:45

## 2022-06-16 RX ADMIN — HEPARIN SODIUM 2500 UNITS: 1000 INJECTION INTRAVENOUS; SUBCUTANEOUS at 13:05

## 2022-06-16 RX ADMIN — FAMOTIDINE 20 MG: 10 INJECTION, SOLUTION INTRAVENOUS at 11:42

## 2022-06-16 RX ADMIN — DILTIAZEM HYDROCHLORIDE 240 MG: 240 CAPSULE, COATED, EXTENDED RELEASE ORAL at 08:15

## 2022-06-16 RX ADMIN — HYDROMORPHONE HYDROCHLORIDE 1 MG: 1 INJECTION, SOLUTION INTRAMUSCULAR; INTRAVENOUS; SUBCUTANEOUS at 22:31

## 2022-06-16 RX ADMIN — PROPOFOL 50 MG: 10 INJECTION, EMULSION INTRAVENOUS at 13:40

## 2022-06-16 RX ADMIN — HEPARIN SODIUM 15 UNITS/KG/HR: 10000 INJECTION, SOLUTION INTRAVENOUS at 06:01

## 2022-06-16 RX ADMIN — CEFAZOLIN SODIUM 2 G: 2 INJECTION, SOLUTION INTRAVENOUS at 12:09

## 2022-06-16 RX ADMIN — PHENYLEPHRINE HYDROCHLORIDE 100 MCG: 10 INJECTION INTRAVENOUS at 12:43

## 2022-06-16 RX ADMIN — MORPHINE SULFATE 2 MG: 2 INJECTION, SOLUTION INTRAMUSCULAR; INTRAVENOUS at 18:48

## 2022-06-16 RX ADMIN — Medication 10 ML: at 22:13

## 2022-06-16 RX ADMIN — PROPOFOL 150 MG: 10 INJECTION, EMULSION INTRAVENOUS at 12:25

## 2022-06-16 RX ADMIN — PANTOPRAZOLE SODIUM 40 MG: 40 TABLET, DELAYED RELEASE ORAL at 06:01

## 2022-06-16 RX ADMIN — PHENYLEPHRINE HYDROCHLORIDE 100 MCG: 10 INJECTION INTRAVENOUS at 13:18

## 2022-06-16 RX ADMIN — TERAZOSIN HYDROCHLORIDE 5 MG: 5 CAPSULE ORAL at 22:12

## 2022-06-16 RX ADMIN — GLYCOPYRROLATE 0.2 MG: 0.2 INJECTION INTRAMUSCULAR; INTRAVENOUS at 13:45

## 2022-06-16 RX ADMIN — FENTANYL CITRATE 25 MCG: 50 INJECTION INTRAMUSCULAR; INTRAVENOUS at 13:42

## 2022-06-16 RX ADMIN — PHENYLEPHRINE HYDROCHLORIDE 150 MCG: 10 INJECTION INTRAVENOUS at 13:05

## 2022-06-16 RX ADMIN — ROCURONIUM BROMIDE 40 MG: 50 INJECTION INTRAVENOUS at 12:25

## 2022-06-16 RX ADMIN — PHENYLEPHRINE HYDROCHLORIDE 100 MCG: 10 INJECTION INTRAVENOUS at 12:35

## 2022-06-16 NOTE — ANESTHESIA PROCEDURE NOTES
Airway  Urgency: elective    Date/Time: 6/16/2022 12:27 PM  Airway not difficult    General Information and Staff    Patient location during procedure: OR  Anesthesiologist: Roshan Luis MD  CRNA/CAA: Cyn Ambrocio CRNA    Indications and Patient Condition  Indications for airway management: airway protection    Preoxygenated: yes  Mask difficulty assessment: 2 - vent by mask + OA or adjuvant +/- NMBA    Final Airway Details  Final airway type: endotracheal airway      Successful airway: ETT  Cuffed: yes   Successful intubation technique: direct laryngoscopy  Facilitating devices/methods: intubating stylet  Endotracheal tube insertion site: oral  Blade: Gagandeep  Blade size: 4  ETT size (mm): 7.5  Cormack-Lehane Classification: grade I - full view of glottis  Placement verified by: capnometry   Measured from: lips  ETT/EBT  to lips (cm): 22  Number of attempts at approach: 1  Assessment: lips, teeth, and gum same as pre-op and atraumatic intubation

## 2022-06-16 NOTE — OP NOTE
Operative Note  Date of Admission:  6/15/2022  OR Date: 6/16/2022    Pre-op Diagnosis:   Arterial stent thrombosis, initial encounter (Formerly [facility]) [T82.868A]  Atherosclerosis of native arteries of extremities with intermittent claudication, bilateral legs (Formerly [facility]) [I70.213]    Post-op Diagnosis:     Post-Op Diagnosis Codes:     * Arterial stent thrombosis, initial encounter (Formerly [facility]) [T82.868A]     * Atherosclerosis of native arteries of extremities with intermittent claudication, bilateral legs (Formerly [facility]) [I70.213]    Procedure:   1) duplex ultrasound-guided percutaneous access of the left common femoral artery with contralateral selection of the right peroneal artery; right lower extremity angiogram; mechanical thrombectomy using penumbra CAT 7 thrombectomy catheter; angioplasty of proximal SFA stent with 6 mm x 40 mm conquest balloon; placement of 50 cm EKOS catheter from the superficial femoral artery into the tibioperoneal trunk.    Surgeon: Donna Bean Jr, MD    Assistant: KHANH Momin CSA    Anesthesia: General    Staff:   Circulator: Carolee Green RN  Radiology Technologist: Lacey Dey  Scrub Person: Danita Umana PCT  Assistant: Chacha Guaman CSA  Vascular Radiology Technician: Anel Menon, ROSE    Estimated Blood Loss: Minimal    Specimens:   Order Name Source Comment Collection Info Order Time   TYPE AND SCREEN    6/16/2022 11:04 AM     Release to patient   Immediate            Complications: Embolization of thrombus into the tibial peroneal trunk and proximal peroneal and posterior tibial arteries.    Findings: Total occlusion of the stented superficial femoral artery from the proximal stent to the distal stent.  The mid popliteal artery reconstituted with three-vessel runoff.  After penumbra catheter the stent was open but there was embolization of thrombus into the tibioperoneal trunk.  The anterior tibial artery appeared normal but minimal filling of the posterior tibial  artery.  Therefore, a thrombolytic catheter was placed.    Indications:  As in preop diagnosis           Procedure: The patient was placed on the operating table in supine position.  After satisfactory general tracheal anesthesia was achieved the patient was prepped from the umbilicus to the knees using ChloraPrep and draped in usual sterile fashion.  Using duplex ultrasound the left common femoral artery was identified.  The femoral artery was accessed percutaneously under direct vision.  Guidewire was advanced and a 6 Moroccan sheath was placed over the guidewire.  A rim catheter was used to select the contralateral right common iliac artery.  The guidewire was then advanced down initially into the profundofemoral artery.  7500 units of heparin was given intravenously.  A 6 Moroccan sheath was then placed over the guidewire and positioned into the common femoral artery.  A right lower extremity angiogram was performed using 25 cc of contrast at 5 cc/s.  The above-mentioned findings were noted.  The guidewire was then redirected down into the superficial femoral artery then into the popliteal artery.  A crossing catheter was placed over the guidewire into the distal popliteal artery and a hand-held injection confirmed this was in the native artery.  An advantage guidewire was then placed through the crossing catheter into the tibioperoneal trunk.  A CAT 7 thrombectomy catheter was then placed over the guidewire and this was used to perform thrombectomy throughout the entire stented segment of the superficial femoral and proximal popliteal arteries.  Injection was then performed through the sheath and this demonstrated patency of the stented superficial femoral artery but there was at least moderate stenosis noted in the proximal stent.  This was treated with a 6 mm x 40 mm conquest balloon up to 26 yaz for 2 minutes.  Following this another injection demonstrated marked improvement in this.  A right lower extremity  angiogram was performed through the sheath and this demonstrated patency of the stent throughout including the popliteal artery but there was now occlusion of the tibioperoneal trunk and the proximal peroneal and posterior tibial arteries did not feel.  The anterior tibial artery appeared normal.  Because of this distal embolization the pharmacologic thrombolysis EKOS catheter was utilized.  A 50 cm long catheter was utilized and positioned with the distal end of the catheter in the tibial peroneal trunk.  The tPA at 1 mg/hour was connected to 1 port along with heparin at 500 units/h.  The coolant at 35 cc an hour was connected to the second port.  Patient had a strong Doppler dorsalis pedis signal at the end of the procedure.  Sterile dressings were then applied.  The patient was then extubated and transported to recovery room in satisfactory condition.        Radiographic Findings:  1) as described above      Active Hospital Problems    Diagnosis  POA   • Arterial stent thrombosis, initial encounter (Formerly [facility]) [T82.868A]  Yes   • Atherosclerosis of native arteries of extremities with intermittent claudication, bilateral legs (Formerly [facility]) [I70.213]  Yes   • Coronary artery disease [I25.10]  Yes   • Hypertension [I10]  Yes   • Hyperlipidemia [E78.5]  Yes   • Chronic obstructive pulmonary disease (HCC) [J44.9]  Yes      Resolved Hospital Problems   No resolved problems to display.      Donna Bean Jr., MD     Date: 6/16/2022  Time: 14:00 EDT

## 2022-06-16 NOTE — ANESTHESIA POSTPROCEDURE EVALUATION
Patient: Yehuda Weaver    Procedure Summary     Date: 06/16/22 Room / Location: [facility] OR 18 [facility] / [facility] HYBRID OR 18/19    Anesthesia Start: 1215 Anesthesia Stop: 1405    Procedure: Right lower extremity arteriogram via left groin approach with placement of thrombolysis infusion catheter (Right ) Diagnosis:       Arterial stent thrombosis, initial encounter ([facility])      Atherosclerosis of native arteries of extremities with intermittent claudication, bilateral legs (HCC)      (Arterial stent thrombosis, initial encounter ([facility]) [T82.868A])      (Atherosclerosis of native arteries of extremities with intermittent claudication, bilateral legs (HCC) [I70.213])    Surgeons: Donna Bean Jr., MD Provider: Roshan Luis MD    Anesthesia Type: general ASA Status: 3          Anesthesia Type: general    Vitals  Vitals Value Taken Time   /70 06/16/22 1446   Temp 37 °C (98.6 °F) 06/16/22 1402   Pulse 68 06/16/22 1501   Resp 16 06/16/22 1430   SpO2 95 % 06/16/22 1501   Vitals shown include unvalidated device data.        Post Anesthesia Care and Evaluation    Patient location during evaluation: PACU  Patient participation: complete - patient participated  Level of consciousness: awake and alert  Pain management: adequate    Airway patency: patent  Anesthetic complications: No anesthetic complications    Cardiovascular status: acceptable  Respiratory status: acceptable  Hydration status: acceptable    Comments: --------------------            06/16/22               1430     --------------------   BP:       132/66     Pulse:      68       Resp:       16       Temp:                SpO2:      94%      --------------------

## 2022-06-16 NOTE — CONSULTS
Name: Yehuda Weaver ADMIT: 6/15/2022   : 1955  PCP: Koby Hernandez MD    MRN: [id number] LOS: 0 days   AGE/SEX: 67 y.o. male  ROOM: [address]    Patient Care Team:  Koby Hernandez MD as PCP - General  Chief Complaint   Patient presents with   • Leg Swelling     CC: Right leg pain    Yehuda Weaver is a 67-year-old gentleman, nondiabetic smoker, with peripheral arterial disease of the lower extremities, who presents with a 24-hour history of right lower extremity pain and numbness.  The patient is a fair historian.  He has a history of peripheral arterial disease of the lower extremities, post percutaneous revascularization procedures performed in our office for lower extremity claudications and possible rest pain.  He developed narrowings in both lower extremities, treated with laser atherectomy and balloon angioplasty.  He had been doing well and in his usual state of health yesterday when he experienced the sudden and unexplained onset of numbness in the right foot, followed by progressive, severe right distal leg and foot pain.  His contralateral lower extremity was normal.  The symptoms persisted and he presented himself to the emergency room.  He is able to wiggle his toes and has sensation to light touch, and has a Doppler signal in the right foot.  CT angiogram demonstrated 6 patent aortoiliac segments with right superficial femoral artery and SFA stent occlusion, with reconstitution of the popliteal artery.  The left superficial femoral artery and stent are patent, with mild stenosis, less than 50%.  The patient has been feeling well and has had no headaches or trauma.  No recent surgeries.  No abnormal bleeding.    Review of Systems  Past Medical History:   Diagnosis Date   • Bladder cancer (HCC)    • Coronary artery disease    • Diverticulitis    • Heart attack (HCC)    • Hyperlipidemia    • Hypertension      Past Surgical History:   Procedure Laterality Date   • BACK  SURGERY     • COLONOSCOPY  07/22/2020    Dr. Castano   • CORONARY ARTERY BYPASS GRAFT  2007    Triple coronary bypass   • HERNIA REPAIR     • PROSTATE SURGERY     • UPPER GASTROINTESTINAL ENDOSCOPY  07/22/2020    Dr. Castano     Family History   Problem Relation Age of Onset   • Lung cancer Brother         Unsure of age   • Heart failure Mother    • Clotting disorder Father    • Heart attack Father    • Colon cancer Neg Hx        Social History     Tobacco Use   • Smoking status: Current Every Day Smoker     Packs/day: 1.00   • Smokeless tobacco: Never Used   Vaping Use   • Vaping Use: Never used   Substance Use Topics   • Alcohol use: Not Currently   • Drug use: Never     Medications Prior to Admission   Medication Sig Dispense Refill Last Dose   • aspirin 81 MG chewable tablet Chew 81 mg Daily.   6/15/2022 at Unknown time   • atorvastatin (LIPITOR) 80 MG tablet Daily.   6/15/2022 at Unknown time   • dilTIAZem (TIAZAC) 240 MG 24 hr capsule Daily.   6/15/2022 at Unknown time   • doxazosin (CARDURA) 4 MG tablet    6/14/2022 at Unknown time   • fluticasone (FLONASE) 50 MCG/ACT nasal spray    6/15/2022 at Unknown time   • lisinopril-hydrochlorothiazide (PRINZIDE,ZESTORETIC) 20-25 MG per tablet    6/15/2022 at Unknown time   • metoprolol succinate XL (TOPROL-XL) 100 MG 24 hr tablet Daily.   6/15/2022 at Unknown time   • montelukast (SINGULAIR) 10 MG tablet Daily.   6/15/2022 at Unknown time   • multivitamin (THERAGRAN) tablet tablet    6/15/2022 at Unknown time   • Omega-3 Fatty Acids (fish oil) 1000 MG capsule capsule    6/15/2022 at Unknown time   • omeprazole (priLOSEC) 40 MG capsule omeprazole 40 mg oral capsule,delayed release(DR/EC) take 1 capsule (40 mg) by oral route once daily before a meal in combination with clarithromycin   Active   6/15/2022 at Unknown time        heparin, 11.8 Units/kg/hr    Penicillins    Vital Signs and Labs:  Vital Signs Patient Vitals for the past 24 hrs:   BP Temp Temp src Pulse Resp  "SpO2 Height Weight   06/15/22 2101 143/82 -- -- 66 17 90 % -- --   06/15/22 1932 -- -- -- 59 -- 93 % -- --   06/15/22 1931 131/67 -- -- -- -- -- -- --   06/15/22 1901 124/71 -- -- 57 18 92 % -- --   06/15/22 1800 121/73 -- -- 57 -- 91 % -- --   06/15/22 1731 129/81 -- -- 64 -- 92 % -- --   06/15/22 1632 -- -- -- 59 -- 91 % -- --   06/15/22 1631 124/73 -- -- -- -- -- -- --   06/15/22 1629 -- -- -- -- -- -- 170.2 cm (67\") 84.4 kg (186 lb)   06/15/22 1604 -- 96.7 °F (35.9 °C) Tympanic 66 18 98 % -- --     BMI:  Body mass index is 29.13 kg/m².    Physical Exam:  Physical Exam  Constitutional:       Appearance: Normal appearance.      Comments: Generously proportioned gentleman awake, alert, oriented and appropriate.  No distress.  Does not appear to be in pain, but has had morphine.   Eyes:      Extraocular Movements: Extraocular movements intact.      Conjunctiva/sclera: Conjunctivae normal.      Pupils: Pupils are equal, round, and reactive to light.   Neck:      Vascular: No carotid bruit.   Cardiovascular:      Rate and Rhythm: Normal rate and regular rhythm.      Heart sounds: Normal heart sounds.      Comments: Femoral artery pulses palpated bilaterally.  Popliteal and pedal artery pulses not palpated on either side.  Longitudinal scar in the medial right thigh from previous GSV harvest.  Right and left feet pink and warm.  Present bilaterally.  Sensation to light touch intact.  Wiggles toes normally.  Pulmonary:      Effort: Pulmonary effort is normal. No respiratory distress.      Breath sounds: No stridor.   Chest:      Chest wall: No tenderness.   Musculoskeletal:         General: Normal range of motion.      Cervical back: Normal range of motion and neck supple. No rigidity.      Right lower leg: No edema.      Left lower leg: No edema.   Skin:     General: Skin is warm and dry.      Capillary Refill: Capillary refill takes less than 2 seconds.      Coloration: Skin is not jaundiced.      Findings: No " bruising or rash.      Comments: Dry, crusted skin in the right and left feet.   Neurological:      General: No focal deficit present.      Mental Status: He is alert and oriented to person, place, and time.      Cranial Nerves: No cranial nerve deficit.   Psychiatric:         Mood and Affect: Mood normal.         Behavior: Behavior normal.         Thought Content: Thought content normal.         Judgment: Judgment normal.        CBC    Results from last 7 days   Lab Units 06/15/22  1630   WBC 10*3/mm3 11.92*   HEMOGLOBIN g/dL 16.1   PLATELETS 10*3/mm3 188     BMP   Results from last 7 days   Lab Units 06/15/22  1630   SODIUM mmol/L 140   POTASSIUM mmol/L 4.1   CHLORIDE mmol/L 104   CO2 mmol/L 22.4   BUN mg/dL 15   CREATININE mg/dL 0.85   GLUCOSE mg/dL 77     Cr Clearance Estimated Creatinine Clearance: 87.6 mL/min (by C-G formula based on SCr of 0.85 mg/dL).  Radiology(recent) CT Angio Abdominal Aorta Bilateral Iliofem Runoff    Result Date: 6/15/2022   1. Occlusion of the right superficial femoral artery stent, with thready opacification of the arteries of the right lower leg by collateral flow. Areas of arterial narrowing, as detailed above. 2. Incidental findings as noted.  Discussed by telephone with Juancho Cruz at time of interpretation, 1910, 06/15/2022.  This report was finalized on 6/15/2022 7:13 PM by Dr. Vikas Appiah M.D.      CTA performed today personally reviewed.  Has iliac artery atherosclerosis without high-grade stenosis.  Posterior plaquing in right CFA with moderate stenosis.  SFA sinus proximal to femoral-popliteal occlusion.  The popliteal artery reconstitutes above the knee and there is three-vessel runoff below the knee.    Active Hospital Problems    Diagnosis  POA   • Arterial stent thrombosis, initial encounter (Formerly [facility]) [T82.928A]  Yes   • Atherosclerosis of native arteries of extremities with intermittent claudication, bilateral legs (Formerly [facility]) [I70.213]  Yes   • Coronary artery disease  [I25.10]  Yes   • Hypertension [I10]  Yes   • Hyperlipidemia [E78.5]  Yes   • Chronic obstructive pulmonary disease (HCC) [J44.9]  Yes      Resolved Hospital Problems   No resolved problems to display.     Problem Points:  4:  Patient has a new problem, with additional work-up planned  Total problem points:4 or more    Data Points:  1:  I have reviewed or order clinical lab test  1:  I have reviewed or order radiology test (except heart catheterization or echo)  2:  I have personally and independently review of image, tracing, or specimen  2:  I have reviewed and summation of old records and/or discussed the patients care with another health care provider  Total data points:4 or more    Risk Points:  High:  Cardiovascular imaging with risk factors    MDM requires 2/3 (Problem points, Data points and Risk)  MDM Prob point Data point Risk   SF 1 1 Minimal   Low 2 2 Low   Mod 3 3 Moderate   High 4 4 High     Code requires 3/3 (MDM, History and Exam)  Code MDM History Exam Time   03610 SF/Low Detailed Detailed 30   24606 Mod Comprehensive Comprehensive 50   26665 High Comprehensive Comprehensive 70     Detailed history:  4 elements HPI or status of 3 chronic problems; 2-9 system ROS  Comprehensive:  4 elements HPI or status of 3 chronic problems;  10 system ROS    Detailed Exam:  12 findings from any organ system  Comprehensive Exam:  2 findings from each of 9 systems.   23610    Assessment & Plan       Chronic obstructive pulmonary disease (HCC)    Coronary artery disease    Hyperlipidemia    Hypertension    Arterial stent thrombosis, initial encounter (HCC)    Atherosclerosis of native arteries of extremities with intermittent claudication, bilateral legs (HCC)    67 y.o. male nondiabetic smoker with peripheral arterial disease of the right and left lower extremities with history of disabling claudication bilaterally related to SFA arterial occlusive disease bilaterally.  Has previously undergone bilateral lower  extremity stent angioplasty.  Apparently experienced in-stent stenosis related to intimal hyperplasia, treated with laser atherectomy and balloon angioplasty.  Presents with acute right lower extremity ischemia due to femoral stent thrombosis.  There is posterior common femoral artery plaque with moderate CFA stenosis.  Expect he may have thrombosed because of progressive disease.  At present he has Kim 2a ischemia, and his foot is ischemic but not immediately threatened.  Of note he has undergone right GSV harvest from the thigh for CABG.  There are no contraindications to thrombolysis.  Plan heparin anticoagulation.  It has not yet been initiated, and I discussed it with nurse and ordered it.  Propose right lower extremity arteriogram via left groin approach with placement of thrombolysis infusion catheter tomorrow.  Patient with history of nonanaphylactic penicillin allergy.  We will give Kefzol.  Discussed smoking cessation.  Patient says this episode has convinced him and he is going to quit.  Discussed the nature of the problem and the proposed procedure with the patient and his family.  Described the procedure, benefits risks and alternatives and answered their questions.  Informed consent.    I discussed the patients findings and my recommendations with patient, family and consulting provider.    Yehuda Salgado MD  06/15/22  21:41 EDT    Please call my office with any question: [phone]

## 2022-06-16 NOTE — NURSING NOTE
1600 fibrinogen 345. Call placed to Dr. Donna Bean. Verbal order to decrease alteplase to 0.5mg/hr

## 2022-06-16 NOTE — PROGRESS NOTES
Name: Yehuda Weaver ADMIT: 6/15/2022   : 1955  PCP: Koby Hernandez MD    MRN: [id number] LOS: 0 days   AGE/SEX: 67 y.o. male  ROOM: [facility] Main OR/MAIN OR     Subjective   Subjective   Seen in PACU. Doing well. Pain resolved. Denies fever, chest pain, shortness of breath, nausea. Indwelling catheter in place    Review of Systems   Constitutional: Negative for chills and fever.   HENT: Negative for congestion.    Respiratory: Negative for shortness of breath.    Cardiovascular: Negative for chest pain.   Gastrointestinal: Negative for nausea.   Genitourinary: Negative for difficulty urinating.   Musculoskeletal: Negative for arthralgias and myalgias.   Skin: Negative for rash.   Neurological: Negative for headaches.   Psychiatric/Behavioral: Negative for confusion.        Objective   Objective   Vital Signs  Temp:  [96.7 °F (35.9 °C)-98.6 °F (37 °C)] 98.6 °F (37 °C)  Heart Rate:  [57-81] 71  Resp:  [16-18] 16  BP: (121-167)/(64-82) 133/69  Arterial Line BP: (116-135)/(54-56) 133/56  SpO2:  [90 %-98 %] 95 %  on  Flow (L/min):  [2-4] 3;   Device (Oxygen Therapy): nasal cannula  Body mass index is 29.13 kg/m².  Physical Exam  Vitals and nursing note reviewed.   Constitutional:       General: He is not in acute distress.  HENT:      Head: Normocephalic.      Mouth/Throat:      Mouth: Mucous membranes are moist.   Eyes:      Conjunctiva/sclera: Conjunctivae normal.   Cardiovascular:      Rate and Rhythm: Normal rate and regular rhythm.   Pulmonary:      Effort: Pulmonary effort is normal. No respiratory distress.      Breath sounds: Normal breath sounds.   Abdominal:      General: Bowel sounds are normal.      Palpations: Abdomen is soft.   Musculoskeletal:      Cervical back: Neck supple.      Right lower leg: No edema.      Left lower leg: No edema.   Skin:     General: Skin is warm and dry.   Neurological:      Mental Status: He is alert and oriented to person, place, and time.   Psychiatric:         Mood  and Affect: Mood normal.         Behavior: Behavior normal.         Results Review     I reviewed the patient's new clinical results.  Results from last 7 days   Lab Units 06/16/22  0429 06/15/22  1630   WBC 10*3/mm3 10.68 11.92*   HEMOGLOBIN g/dL 15.4 16.1   PLATELETS 10*3/mm3 165 188     Results from last 7 days   Lab Units 06/16/22  0429 06/15/22  1630   SODIUM mmol/L 139 140   POTASSIUM mmol/L 3.9 4.1   CHLORIDE mmol/L 104 104   CO2 mmol/L 25.0 22.4   BUN mg/dL 12 15   CREATININE mg/dL 0.76 0.85   GLUCOSE mg/dL 105* 77   EGFR mL/min/1.73 98.5 95.2     Results from last 7 days   Lab Units 06/15/22  1630   ALBUMIN g/dL 4.30   BILIRUBIN mg/dL 0.3   ALK PHOS U/L 87   AST (SGOT) U/L 25   ALT (SGPT) U/L 38     Results from last 7 days   Lab Units 06/16/22  0429 06/15/22  1630   CALCIUM mg/dL 8.6 9.1   ALBUMIN g/dL  --  4.30       No results found for: HGBA1C, POCGLU    CT Angio Abdominal Aorta Bilateral Iliofem Runoff    Result Date: 6/15/2022   1. Occlusion of the right superficial femoral artery stent, with thready opacification of the arteries of the right lower leg by collateral flow. Areas of arterial narrowing, as detailed above. 2. Incidental findings as noted.  Discussed by telephone with Juancho Cruz at time of interpretation, 1910, 06/15/2022.  This report was finalized on 6/15/2022 7:13 PM by Dr. Vikas Appiah M.D.      Scheduled Medications  [MAR Hold] atorvastatin, 80 mg, Oral, Daily  dilTIAZem CD, 240 mg, Oral, Q24H  lisinopril-hydroCHLOROthiazide (ZESTORETIC) 20-25 mg combo dose, , Oral, Q24H  metoprolol succinate XL, 100 mg, Oral, Q24H  [MAR Hold] pantoprazole, 40 mg, Oral, QAM  [MAR Hold] sodium chloride, 10 mL, Intravenous, Q12H  terazosin, 5 mg, Oral, Nightly    Infusions  alteplase (ACTIVASE) 20 mg in 0.9% NaCl 500 mL, 1 mg/hr  heparin, 11.8 Units/kg/hr, Last Rate: 15 Units/kg/hr (06/16/22 0601)  lactated ringers, 75 mL/hr, Last Rate: 75 mL/hr (06/16/22 0545)  lactated ringers, 9 mL/hr, Last  Rate: Stopped (06/16/22 1358)    Diet  NPO Diet NPO Type: Sips with Meds       Assessment/Plan     Active Hospital Problems    Diagnosis  POA   • **Atherosclerosis of native arteries of extremities with intermittent claudication, bilateral legs ([facility]) [I70.213]  Yes   • Tobacco dependence due to cigarettes [F17.210]  Yes   • Arterial stent thrombosis, initial encounter ([facility]) [T82.868A]  Yes   • Coronary artery disease [I25.10]  Yes   • Hypertension [I10]  Yes   • Hyperlipidemia [E78.5]  Yes   • Chronic obstructive pulmonary disease (HCC) [J44.9]  Yes      Resolved Hospital Problems   No resolved problems to display.       67 y.o. male admitted with Atherosclerosis of native arteries of extremities with intermittent claudication, bilateral legs (HCC).    Right superficial femoral artery stent thrombosis  Peripheral arterial disease  Appreciate Vascular assistance  Started on heparin gtt overnight. S/P RLE angiogram, mechanical thrombectomy, angiogram of proximal SFA, placement of EKOS catheter  Aspirin 81 on hold     Coronary artery disease  Status post CABG  Hold ASA, resume Statin and BB     Hypertension  Resume home diltiazem, lisinopril, HCTZ  BP acceptable     Hyperlipidemia  Resume statin      Tobacco use disorder  Current ppd smoker, has ~50 pack year history  Refuses nicotine patch at this time       · heparin gtt for DVT prophylaxis.  · Full code.  · Discussed with patient and nursing staff.  · Anticipate discharge TBD       OSCAR Cabrera  [facility]  06/16/22  15:18 EDT

## 2022-06-16 NOTE — ANESTHESIA PREPROCEDURE EVALUATION
Anesthesia Evaluation     Patient summary reviewed and Nursing notes reviewed   NPO Solid Status: > 6 hours  NPO Liquid Status: > 6 hours           Airway   Mallampati: I  TM distance: >3 FB  Neck ROM: full  Dental    (+) upper dentures    Pulmonary    (+) COPD moderate,   Cardiovascular     ECG reviewed  Beta blocker given within 24 hours of surgery    (+) hypertension, CAD, CABG >6 Months, PVD, hyperlipidemia,       Neuro/Psych- negative ROS  GI/Hepatic/Renal/Endo    (+)  GERD well controlled,    (-) hepatitis, liver disease, no renal disease, diabetes, no thyroid disorder    Musculoskeletal     Abdominal    Substance History      OB/GYN          Other      history of cancer (hx bladder ca)                    Anesthesia Plan    ASA 3     general       Anesthetic plan, risks, benefits, and alternatives have been provided, discussed and informed consent has been obtained with: patient.        CODE STATUS:    Level Of Support Discussed With: Patient  Code Status (Patient has no pulse and is not breathing): CPR (Attempt to Resuscitate)  Medical Interventions (Patient has pulse or is breathing): Full Support

## 2022-06-16 NOTE — ANESTHESIA PROCEDURE NOTES
Arterial Line      Patient reassessed immediately prior to procedure    Patient location during procedure: pre-op  Start time: 6/16/2022 11:18 AM  Stop Time:6/16/2022 11:23 AM       Line placed for hemodynamic monitoring.  Performed By   Anesthesiologist: Florin Whittaker MD  Preanesthetic Checklist  Completed: patient identified, IV checked, site marked, risks and benefits discussed, surgical consent, monitors and equipment checked, pre-op evaluation and timeout performed  Arterial Line Prep   Sterile Tech: cap, gloves, mask and sterile barriers  Prep: ChloraPrep  Patient monitoring: blood pressure monitoring, continuous pulse oximetry and EKG  Arterial Line Procedure   Laterality:left  Location:  radial artery  Catheter size: 20 G   Number of attempts: 1  Successful placement: yes  Post Assessment   Dressing Type: occlusive dressing applied.   Complications no  Patient Tolerance: patient tolerated the procedure well with no apparent complications

## 2022-06-16 NOTE — PLAN OF CARE
Goal Outcome Evaluation:  Plan of Care Reviewed With: patient        Progress: no change  Outcome Evaluation: Admitted from ED for right lower extremity arterial occlusion. Heparin drip initiated, PTT pending for this AM. To go to OR, time unknown. Denies pain. Up with stand by assist. NPO since midnight.

## 2022-06-16 NOTE — H&P
Patient Name:  Yehuda Weaver  YOB: 1955  MRN:  [id number]  Admit Date:  6/15/2022  Patient Care Team:  Koby Heranndez MD as PCP - General      Subjective   History Present Illness     Chief Complaint   Patient presents with   • Leg Swelling       This is a 67-year-old male with a past medical history of coronary artery disease status post CABG, hypertension, hyperlipidemia, bladder cancer, as well as peripheral artery disease status post stenting.  He came to the hospital with acute onset right lower leg pain, numbness, and tingling that started earlier in the day.  He had a stent placed in the right superficial femoral artery approximately 3 to 4 months ago, and was on DAPT for about 1 month at which time Plavix was discontinued.  He was instructed to remain on aspirin 81 daily.  A CT angiogram demonstrated occlusion of the right superficial femoral artery stent.  He was ordered on heparin drip and will go for potential thrombolysis tomorrow by vascular surgery.    Also noted on the CT scan were bilateral renal cysts, stable left adrenal angiolipoma, enlarged prostate.    Review of Systems   Constitutional: Negative for chills and fever.   HENT: Negative for sore throat and trouble swallowing.    Respiratory: Negative for chest tightness and shortness of breath.    Cardiovascular: Negative for chest pain and palpitations.   Gastrointestinal: Negative for abdominal pain and blood in stool.   Genitourinary: Negative for difficulty urinating and hematuria.   Musculoskeletal: Positive for myalgias. Negative for back pain and joint swelling.   Skin: Negative for pallor and rash.   Neurological: Positive for numbness. Negative for dizziness and light-headedness.        Personal History     Past Medical History:   Diagnosis Date   • Bladder cancer (HCC) 2004   • Coronary artery disease    • Diverticulitis    • Heart attack (HCC)    • Hyperlipidemia    • Hypertension      Past Surgical History:    Procedure Laterality Date   • BACK SURGERY     • COLONOSCOPY  07/22/2020    Dr. Castano   • CORONARY ARTERY BYPASS GRAFT  2007    Triple coronary bypass   • HERNIA REPAIR     • PROSTATE SURGERY     • UPPER GASTROINTESTINAL ENDOSCOPY  07/22/2020    Dr. Castano     Family History   Problem Relation Age of Onset   • Lung cancer Brother         Unsure of age   • Heart failure Mother    • Clotting disorder Father    • Heart attack Father    • Colon cancer Neg Hx      Social History     Tobacco Use   • Smoking status: Current Every Day Smoker     Packs/day: 1.00   • Smokeless tobacco: Never Used   Vaping Use   • Vaping Use: Never used   Substance Use Topics   • Alcohol use: Not Currently   • Drug use: Never     No current facility-administered medications on file prior to encounter.     Current Outpatient Medications on File Prior to Encounter   Medication Sig Dispense Refill   • aspirin 81 MG chewable tablet Chew 81 mg Daily.     • atorvastatin (LIPITOR) 80 MG tablet Daily.     • dilTIAZem (TIAZAC) 240 MG 24 hr capsule Daily.     • doxazosin (CARDURA) 4 MG tablet      • fluticasone (FLONASE) 50 MCG/ACT nasal spray      • lisinopril-hydrochlorothiazide (PRINZIDE,ZESTORETIC) 20-25 MG per tablet      • metoprolol succinate XL (TOPROL-XL) 100 MG 24 hr tablet Daily.     • montelukast (SINGULAIR) 10 MG tablet Daily.     • multivitamin (THERAGRAN) tablet tablet      • Omega-3 Fatty Acids (fish oil) 1000 MG capsule capsule      • omeprazole (priLOSEC) 40 MG capsule omeprazole 40 mg oral capsule,delayed release(DR/EC) take 1 capsule (40 mg) by oral route once daily before a meal in combination with clarithromycin   Active       Allergies   Allergen Reactions   • Penicillins Unknown - Low Severity       Objective    Objective     Vital Signs  Temp:  [96.7 °F (35.9 °C)] 96.7 °F (35.9 °C)  Heart Rate:  [57-66] 66  Resp:  [17-18] 17  BP: (121-143)/(67-82) 143/82  SpO2:  [90 %-98 %] 90 %  on   ;   Device (Oxygen Therapy): room  air  Body mass index is 29.13 kg/m².    Physical Exam  Constitutional:       Appearance: Normal appearance.   HENT:      Head: Normocephalic and atraumatic.   Eyes:      Extraocular Movements: Extraocular movements intact.      Pupils: Pupils are equal, round, and reactive to light.   Cardiovascular:      Rate and Rhythm: Normal rate and regular rhythm.   Pulmonary:      Effort: Pulmonary effort is normal. No respiratory distress.      Breath sounds: Wheezing present.   Abdominal:      General: There is no distension.      Palpations: Abdomen is soft.      Tenderness: There is no abdominal tenderness.   Musculoskeletal:         General: No swelling or tenderness.   Skin:     General: Skin is warm and dry.   Neurological:      General: No focal deficit present.      Mental Status: He is alert and oriented to person, place, and time.         Results Review:  I reviewed the patient's new clinical results.  I reviewed the patient's new imaging results and agree with the interpretation.  I reviewed the patient's other test results and agree with the interpretation  I personally viewed and interpreted the patient's EKG/Telemetry data  Discussed with ED provider.    Lab Results (last 24 hours)     Procedure Component Value Units Date/Time    CBC & Differential [[id number]]  (Abnormal) Collected: 06/15/22 1630    Specimen: Blood Updated: 06/15/22 1703    Narrative:      The following orders were created for panel order CBC & Differential.  Procedure                               Abnormality         Status                     ---------                               -----------         ------                     CBC Auto Differential[[id number]]        Abnormal            Final result                 Please view results for these tests on the individual orders.    Comprehensive Metabolic Panel [[id number]] Collected: 06/15/22 1630    Specimen: Blood Updated: 06/15/22 1733     Glucose 77 mg/dL      BUN 15 mg/dL      Creatinine  0.85 mg/dL      Sodium 140 mmol/L      Potassium 4.1 mmol/L      Chloride 104 mmol/L      CO2 22.4 mmol/L      Calcium 9.1 mg/dL      Total Protein 6.7 g/dL      Albumin 4.30 g/dL      ALT (SGPT) 38 U/L      AST (SGOT) 25 U/L      Alkaline Phosphatase 87 U/L      Total Bilirubin 0.3 mg/dL      Globulin 2.4 gm/dL      A/G Ratio 1.8 g/dL      BUN/Creatinine Ratio 17.6     Anion Gap 13.6 mmol/L      eGFR 95.2 mL/min/1.73      Comment: National Kidney Foundation and American Society of Nephrology (ASN) Task Force recommended calculation based on the Chronic Kidney Disease Epidemiology Collaboration (CKD-EPI) equation refit without adjustment for race.       Narrative:      GFR Normal >60  Chronic Kidney Disease <60  Kidney Failure <15      CBC Auto Differential [[id number]]  (Abnormal) Collected: 06/15/22 1630    Specimen: Blood Updated: 06/15/22 1703     WBC 11.92 10*3/mm3      RBC 5.33 10*6/mm3      Hemoglobin 16.1 g/dL      Hematocrit 47.6 %      MCV 89.3 fL      MCH 30.2 pg      MCHC 33.8 g/dL      RDW 13.7 %      RDW-SD 44.2 fl      MPV 12.0 fL      Platelets 188 10*3/mm3      Neutrophil % 59.9 %      Lymphocyte % 26.7 %      Monocyte % 9.6 %      Eosinophil % 3.1 %      Basophil % 0.5 %      Immature Grans % 0.2 %      Neutrophils, Absolute 7.14 10*3/mm3      Lymphocytes, Absolute 3.18 10*3/mm3      Monocytes, Absolute 1.15 10*3/mm3      Eosinophils, Absolute 0.37 10*3/mm3      Basophils, Absolute 0.06 10*3/mm3      Immature Grans, Absolute 0.02 10*3/mm3      nRBC 0.0 /100 WBC     Protime-INR [[id number]]  (Normal) Collected: 06/15/22 1943    Specimen: Blood from Arm, Right Updated: 06/15/22 2004     Protime 12.9 Seconds      INR 0.98    aPTT [[id number]]  (Normal) Collected: 06/15/22 1943    Specimen: Blood from Arm, Right Updated: 06/15/22 2004     PTT 26.0 seconds     COVID PRE-OP / PRE-PROCEDURE SCREENING ORDER (NO ISOLATION) - Swab, Nasopharynx [[id number]] Collected: 06/15/22 2046    Specimen: Swab from  Nasopharynx Updated: 06/15/22 2055    Narrative:      The following orders were created for panel order COVID PRE-OP / PRE-PROCEDURE SCREENING ORDER (NO ISOLATION) - Swab, Nasopharynx.  Procedure                               Abnormality         Status                     ---------                               -----------         ------                     COVID-19,SATHYA WHITEU IN-HOUSE...[[id number]]                      In process                   Please view results for these tests on the individual orders.    COVID-19,SATHYA RADHA IN-HOUSE CEPHEID/ADIEL NP SWAB IN TRANSPORT MEDIA 8-12 HR TAT - Swab, Nasopharynx [[id number]] Collected: 06/15/22 2046    Specimen: Swab from Nasopharynx Updated: 06/15/22 2055          Imaging Results (Last 24 Hours)     Procedure Component Value Units Date/Time    CT Angio Abdominal Aorta Bilateral Iliofem Runoff [[id number]] Collected: 06/15/22 1857     Updated: 06/15/22 1916    Narrative:      CT ANGIO ABDOMINAL AORTA BILAT ILIOFEM RUNOFF-     INDICATIONS: Right lower extremity pain, possible occluded stent.   Radiation dose reduction techniques were utilized, including automated  exposure control and exposure modulation based on body size.     TECHNIQUE: CT angiography of the abdominal aorta with bilateral lower  extremity runoff. NASCET criteria. 3-dimensional reconstructions.     COMPARISON: 02/28/2022     FINDINGS:     Vascular:     No abdominal aortic aneurysm or dissection. Scattered plaque is seen in  the abdominal aorta without high-grade stenosis (0-49% stenosis.     Patent celiac artery, SMA, bilateral renal arteries, LEONARDO.     Scattered plaque is seen in the bilateral iliac arteries without  high-grade stenosis (0-49% stenosis).     Right lower extremity runoff:     The proximal right superficial femoral artery is occluded, and bypass  stent is also occluded. Opacification of the popliteal artery is seen by  collateral flow, and there appears to be opacification of the right  anterior  and posterior tibial arteries and peroneal artery that becomes  increasingly thready at the distal aspect of the right lower leg.     Left lower extremity runoff: Plaque is seen in the left common femoral  artery without high-grade stenosis (0-49% stenosis. The left deep  femoral artery is patent. The left superficial femoral artery bypass  stent appears patent. Calcification in the left popliteal artery appears  to result in as much as about 50% stenosis. Scattered calcifications are  seen in the arteries at the lower leg with prominent stenosis suggested  at the origin of the left anterior tibial artery, and at the proximal to  mid aspect of the left posterior tibial artery.           Abdomen pelvis:     Bilateral renal cysts are seen.     Stable appearing left adrenal angiomyolipoma is apparent.     Otherwise unremarkable appearance of the liver, spleen, adrenal glands,  pancreas, kidneys, bladder. The prostate is enlarged, 5.3 cm, recommend  PSA correlation as indicated (appearance is not significantly changed).     No bowel obstruction or abnormal bowel thickening is identified. Colonic  diverticula are seen that do not appear inflamed.     No free intraperitoneal gas or free fluid.     Scattered small mesenteric and para-aortic lymph nodes are seen that are  not significant by size criteria.     The lung bases are clear.     Degenerative changes are seen in the spine. No acute fracture is  identified.             Impression:         1. Occlusion of the right superficial femoral artery stent, with thready  opacification of the arteries of the right lower leg by collateral flow.  Areas of arterial narrowing, as detailed above.  2. Incidental findings as noted.     Discussed by telephone with Juancho Cruz at time of interpretation,  1910, 06/15/2022.     This report was finalized on 6/15/2022 7:13 PM by Dr. Vikas Appiah M.D.                 No orders to display        Assessment/Plan     Active Hospital  Problems    Diagnosis  POA   • Arterial stent thrombosis, initial encounter ([facility]) [T85.487E]  Yes      Resolved Hospital Problems   No resolved problems to display.           Right superficial femoral artery stent thrombosis  Peripheral arterial disease  Heparin drip started  Vascular surgery consulted.  Potential thrombolysis tomorrow  Aspirin 81 to be held until evaluated by vascular    Coronary artery disease  Status post CABG  Hold ASA, resume Statin and BB    Hypertension  Resume home diltiazem, lisinopril, HCTZ    Hyperlipidemia  Resume statin     Tobacco use disorder  Current ppd smoker, has ~50 pack year history  Nicotine patch        VTE Prophylaxis - Heparin drip  Code Status - Full code.       Zi Jimenez MD  [facility]  06/15/22  21:13 EDT

## 2022-06-17 ENCOUNTER — ANESTHESIA (OUTPATIENT)
Dept: PERIOP | Facility: HOSPITAL | Age: 67
End: 2022-06-17

## 2022-06-17 ENCOUNTER — ANESTHESIA EVENT (OUTPATIENT)
Dept: PERIOP | Facility: HOSPITAL | Age: 67
End: 2022-06-17

## 2022-06-17 ENCOUNTER — APPOINTMENT (OUTPATIENT)
Dept: GENERAL RADIOLOGY | Facility: HOSPITAL | Age: 67
End: 2022-06-17

## 2022-06-17 LAB
ACT BLD: 121 SECONDS (ref 82–152)
ACT BLD: 214 SECONDS (ref 82–152)
ACT BLD: 242 SECONDS (ref 82–152)
ANION GAP SERPL CALCULATED.3IONS-SCNC: 7 MMOL/L (ref 5–15)
APTT PPP: 31.9 SECONDS (ref 22.7–35.4)
APTT PPP: 32.8 SECONDS (ref 22.7–35.4)
APTT PPP: 33.5 SECONDS (ref 22.7–35.4)
APTT PPP: 36.2 SECONDS (ref 22.7–35.4)
BASOPHILS # BLD AUTO: 0.05 10*3/MM3 (ref 0–0.2)
BASOPHILS NFR BLD AUTO: 0.4 % (ref 0–1.5)
BUN SERPL-MCNC: 11 MG/DL (ref 8–23)
BUN/CREAT SERPL: 16.4 (ref 7–25)
CALCIUM SPEC-SCNC: 7.9 MG/DL (ref 8.6–10.5)
CHLORIDE SERPL-SCNC: 104 MMOL/L (ref 98–107)
CO2 SERPL-SCNC: 27 MMOL/L (ref 22–29)
CREAT SERPL-MCNC: 0.67 MG/DL (ref 0.76–1.27)
DEPRECATED RDW RBC AUTO: 42.8 FL (ref 37–54)
DEPRECATED RDW RBC AUTO: 43.4 FL (ref 37–54)
DEPRECATED RDW RBC AUTO: 44.4 FL (ref 37–54)
DEPRECATED RDW RBC AUTO: 44.8 FL (ref 37–54)
EGFRCR SERPLBLD CKD-EPI 2021: 102.3 ML/MIN/1.73
EOSINOPHIL # BLD AUTO: 0.08 10*3/MM3 (ref 0–0.4)
EOSINOPHIL NFR BLD AUTO: 0.7 % (ref 0.3–6.2)
ERYTHROCYTE [DISTWIDTH] IN BLOOD BY AUTOMATED COUNT: 13.5 % (ref 12.3–15.4)
ERYTHROCYTE [DISTWIDTH] IN BLOOD BY AUTOMATED COUNT: 13.6 % (ref 12.3–15.4)
ERYTHROCYTE [DISTWIDTH] IN BLOOD BY AUTOMATED COUNT: 13.6 % (ref 12.3–15.4)
ERYTHROCYTE [DISTWIDTH] IN BLOOD BY AUTOMATED COUNT: 13.7 % (ref 12.3–15.4)
FIBRINOGEN PPP-MCNC: 233 MG/DL (ref 219–464)
FIBRINOGEN PPP-MCNC: 257 MG/DL (ref 219–464)
FIBRINOGEN PPP-MCNC: 271 MG/DL (ref 219–464)
FIBRINOGEN PPP-MCNC: 302 MG/DL (ref 219–464)
GLUCOSE SERPL-MCNC: 119 MG/DL (ref 65–99)
HCT VFR BLD AUTO: 40.7 % (ref 37.5–51)
HCT VFR BLD AUTO: 41.1 % (ref 37.5–51)
HCT VFR BLD AUTO: 42.1 % (ref 37.5–51)
HCT VFR BLD AUTO: 44 % (ref 37.5–51)
HGB BLD-MCNC: 13.8 G/DL (ref 13–17.7)
HGB BLD-MCNC: 14.4 G/DL (ref 13–17.7)
IMM GRANULOCYTES # BLD AUTO: 0.03 10*3/MM3 (ref 0–0.05)
IMM GRANULOCYTES NFR BLD AUTO: 0.3 % (ref 0–0.5)
INR PPP: 1.09 (ref 0.9–1.1)
INR PPP: 1.09 (ref 0.9–1.1)
INR PPP: 1.14 (ref 0.9–1.1)
INR PPP: 1.16 (ref 0.9–1.1)
LYMPHOCYTES # BLD AUTO: 2.04 10*3/MM3 (ref 0.7–3.1)
LYMPHOCYTES NFR BLD AUTO: 17.5 % (ref 19.6–45.3)
MCH RBC QN AUTO: 29.5 PG (ref 26.6–33)
MCH RBC QN AUTO: 29.7 PG (ref 26.6–33)
MCH RBC QN AUTO: 30 PG (ref 26.6–33)
MCH RBC QN AUTO: 30.1 PG (ref 26.6–33)
MCHC RBC AUTO-ENTMCNC: 32.7 G/DL (ref 31.5–35.7)
MCHC RBC AUTO-ENTMCNC: 32.8 G/DL (ref 31.5–35.7)
MCHC RBC AUTO-ENTMCNC: 33.6 G/DL (ref 31.5–35.7)
MCHC RBC AUTO-ENTMCNC: 33.9 G/DL (ref 31.5–35.7)
MCV RBC AUTO: 88.9 FL (ref 79–97)
MCV RBC AUTO: 89.3 FL (ref 79–97)
MCV RBC AUTO: 90.2 FL (ref 79–97)
MCV RBC AUTO: 90.7 FL (ref 79–97)
MONOCYTES # BLD AUTO: 0.97 10*3/MM3 (ref 0.1–0.9)
MONOCYTES NFR BLD AUTO: 8.3 % (ref 5–12)
NEUTROPHILS NFR BLD AUTO: 72.8 % (ref 42.7–76)
NEUTROPHILS NFR BLD AUTO: 8.52 10*3/MM3 (ref 1.7–7)
NRBC BLD AUTO-RTO: 0 /100 WBC (ref 0–0.2)
PLATELET # BLD AUTO: 117 10*3/MM3 (ref 140–450)
PLATELET # BLD AUTO: 120 10*3/MM3 (ref 140–450)
PLATELET # BLD AUTO: 126 10*3/MM3 (ref 140–450)
PLATELET # BLD AUTO: 128 10*3/MM3 (ref 140–450)
PMV BLD AUTO: 11 FL (ref 6–12)
PMV BLD AUTO: 11 FL (ref 6–12)
PMV BLD AUTO: 11.2 FL (ref 6–12)
PMV BLD AUTO: 11.7 FL (ref 6–12)
POTASSIUM SERPL-SCNC: 3.8 MMOL/L (ref 3.5–5.2)
PROTHROMBIN TIME: 14 SECONDS (ref 11.7–14.2)
PROTHROMBIN TIME: 14 SECONDS (ref 11.7–14.2)
PROTHROMBIN TIME: 14.5 SECONDS (ref 11.7–14.2)
PROTHROMBIN TIME: 14.7 SECONDS (ref 11.7–14.2)
RBC # BLD AUTO: 4.58 10*6/MM3 (ref 4.14–5.8)
RBC # BLD AUTO: 4.6 10*6/MM3 (ref 4.14–5.8)
RBC # BLD AUTO: 4.64 10*6/MM3 (ref 4.14–5.8)
RBC # BLD AUTO: 4.88 10*6/MM3 (ref 4.14–5.8)
SODIUM SERPL-SCNC: 138 MMOL/L (ref 136–145)
WBC NRBC COR # BLD: 11.07 10*3/MM3 (ref 3.4–10.8)
WBC NRBC COR # BLD: 11.16 10*3/MM3 (ref 3.4–10.8)
WBC NRBC COR # BLD: 11.69 10*3/MM3 (ref 3.4–10.8)
WBC NRBC COR # BLD: 16.19 10*3/MM3 (ref 3.4–10.8)

## 2022-06-17 PROCEDURE — 85384 FIBRINOGEN ACTIVITY: CPT | Performed by: SURGERY

## 2022-06-17 PROCEDURE — C1769 GUIDE WIRE: HCPCS | Performed by: SURGERY

## 2022-06-17 PROCEDURE — 80048 BASIC METABOLIC PNL TOTAL CA: CPT | Performed by: SURGERY

## 2022-06-17 PROCEDURE — C1760 CLOSURE DEV, VASC: HCPCS | Performed by: SURGERY

## 2022-06-17 PROCEDURE — 25010000002 ONDANSETRON PER 1 MG: Performed by: STUDENT IN AN ORGANIZED HEALTH CARE EDUCATION/TRAINING PROGRAM

## 2022-06-17 PROCEDURE — 25010000002 FENTANYL CITRATE (PF) 50 MCG/ML SOLUTION: Performed by: NURSE ANESTHETIST, CERTIFIED REGISTERED

## 2022-06-17 PROCEDURE — C1725 CATH, TRANSLUMIN NON-LASER: HCPCS | Performed by: SURGERY

## 2022-06-17 PROCEDURE — 85610 PROTHROMBIN TIME: CPT | Performed by: SURGERY

## 2022-06-17 PROCEDURE — 85027 COMPLETE CBC AUTOMATED: CPT | Performed by: SURGERY

## 2022-06-17 PROCEDURE — 25010000002 DEXAMETHASONE PER 1 MG: Performed by: STUDENT IN AN ORGANIZED HEALTH CARE EDUCATION/TRAINING PROGRAM

## 2022-06-17 PROCEDURE — 85347 COAGULATION TIME ACTIVATED: CPT

## 2022-06-17 PROCEDURE — 25010000002 HEPARIN (PORCINE) PER 1000 UNITS: Performed by: SURGERY

## 2022-06-17 PROCEDURE — 85730 THROMBOPLASTIN TIME PARTIAL: CPT | Performed by: SURGERY

## 2022-06-17 PROCEDURE — 25010000002 HEPARIN (PORCINE) PER 1000 UNITS: Performed by: STUDENT IN AN ORGANIZED HEALTH CARE EDUCATION/TRAINING PROGRAM

## 2022-06-17 PROCEDURE — 85025 COMPLETE CBC W/AUTO DIFF WBC: CPT | Performed by: SURGERY

## 2022-06-17 PROCEDURE — 25010000002 PROPOFOL 10 MG/ML EMULSION: Performed by: NURSE ANESTHETIST, CERTIFIED REGISTERED

## 2022-06-17 PROCEDURE — 25010000002 CEFAZOLIN IN DEXTROSE 2-4 GM/100ML-% SOLUTION: Performed by: SURGERY

## 2022-06-17 PROCEDURE — C1757 CATH, THROMBECTOMY/EMBOLECT: HCPCS | Performed by: SURGERY

## 2022-06-17 PROCEDURE — 25010000002 PHENYLEPHRINE 10 MG/ML SOLUTION: Performed by: NURSE ANESTHETIST, CERTIFIED REGISTERED

## 2022-06-17 PROCEDURE — C1887 CATHETER, GUIDING: HCPCS | Performed by: SURGERY

## 2022-06-17 PROCEDURE — 06PY33Z REMOVAL OF INFUSION DEVICE FROM LOWER VEIN, PERCUTANEOUS APPROACH: ICD-10-PCS | Performed by: SURGERY

## 2022-06-17 PROCEDURE — 25010000002 PHENYLEPHRINE 10 MG/ML SOLUTION 5 ML VIAL: Performed by: NURSE ANESTHETIST, CERTIFIED REGISTERED

## 2022-06-17 PROCEDURE — X27H385 DILATION OF RIGHT FEMORAL ARTERY WITH SUSTAINED RELEASE DRUG-ELUTING INTRALUMINAL DEVICE, PERCUTANEOUS APPROACH, NEW TECHNOLOGY GROUP 5: ICD-10-PCS | Performed by: SURGERY

## 2022-06-17 PROCEDURE — C1753 CATH, INTRAVAS ULTRASOUND: HCPCS | Performed by: SURGERY

## 2022-06-17 PROCEDURE — 25010000002 HYDROMORPHONE PER 4 MG: Performed by: SURGERY

## 2022-06-17 PROCEDURE — C1894 INTRO/SHEATH, NON-LASER: HCPCS | Performed by: SURGERY

## 2022-06-17 RX ORDER — MIDAZOLAM HYDROCHLORIDE 1 MG/ML
0.5 INJECTION INTRAMUSCULAR; INTRAVENOUS
Status: DISCONTINUED | OUTPATIENT
Start: 2022-06-17 | End: 2022-06-17 | Stop reason: HOSPADM

## 2022-06-17 RX ORDER — EPHEDRINE SULFATE 50 MG/ML
5 INJECTION, SOLUTION INTRAVENOUS ONCE AS NEEDED
Status: DISCONTINUED | OUTPATIENT
Start: 2022-06-17 | End: 2022-06-17 | Stop reason: HOSPADM

## 2022-06-17 RX ORDER — LABETALOL HYDROCHLORIDE 5 MG/ML
5 INJECTION, SOLUTION INTRAVENOUS
Status: DISCONTINUED | OUTPATIENT
Start: 2022-06-17 | End: 2022-06-17 | Stop reason: HOSPADM

## 2022-06-17 RX ORDER — DEXAMETHASONE SODIUM PHOSPHATE 10 MG/ML
INJECTION INTRAMUSCULAR; INTRAVENOUS AS NEEDED
Status: DISCONTINUED | OUTPATIENT
Start: 2022-06-17 | End: 2022-06-17 | Stop reason: SURG

## 2022-06-17 RX ORDER — HEPARIN SODIUM 1000 [USP'U]/ML
INJECTION, SOLUTION INTRAVENOUS; SUBCUTANEOUS AS NEEDED
Status: DISCONTINUED | OUTPATIENT
Start: 2022-06-17 | End: 2022-06-17 | Stop reason: SURG

## 2022-06-17 RX ORDER — LIDOCAINE HYDROCHLORIDE 10 MG/ML
0.5 INJECTION, SOLUTION EPIDURAL; INFILTRATION; INTRACAUDAL; PERINEURAL ONCE AS NEEDED
Status: DISCONTINUED | OUTPATIENT
Start: 2022-06-17 | End: 2022-06-17 | Stop reason: HOSPADM

## 2022-06-17 RX ORDER — PROMETHAZINE HYDROCHLORIDE 25 MG/1
25 SUPPOSITORY RECTAL ONCE AS NEEDED
Status: DISCONTINUED | OUTPATIENT
Start: 2022-06-17 | End: 2022-06-17 | Stop reason: HOSPADM

## 2022-06-17 RX ORDER — ONDANSETRON 2 MG/ML
4 INJECTION INTRAMUSCULAR; INTRAVENOUS ONCE AS NEEDED
Status: DISCONTINUED | OUTPATIENT
Start: 2022-06-17 | End: 2022-06-17 | Stop reason: HOSPADM

## 2022-06-17 RX ORDER — SODIUM CHLORIDE, SODIUM LACTATE, POTASSIUM CHLORIDE, CALCIUM CHLORIDE 600; 310; 30; 20 MG/100ML; MG/100ML; MG/100ML; MG/100ML
9 INJECTION, SOLUTION INTRAVENOUS CONTINUOUS
Status: DISCONTINUED | OUTPATIENT
Start: 2022-06-17 | End: 2022-06-17

## 2022-06-17 RX ORDER — PROMETHAZINE HYDROCHLORIDE 25 MG/1
25 TABLET ORAL ONCE AS NEEDED
Status: DISCONTINUED | OUTPATIENT
Start: 2022-06-17 | End: 2022-06-17 | Stop reason: HOSPADM

## 2022-06-17 RX ORDER — FENTANYL CITRATE 50 UG/ML
50 INJECTION, SOLUTION INTRAMUSCULAR; INTRAVENOUS
Status: DISCONTINUED | OUTPATIENT
Start: 2022-06-17 | End: 2022-06-17 | Stop reason: HOSPADM

## 2022-06-17 RX ORDER — HYDRALAZINE HYDROCHLORIDE 20 MG/ML
5 INJECTION INTRAMUSCULAR; INTRAVENOUS
Status: DISCONTINUED | OUTPATIENT
Start: 2022-06-17 | End: 2022-06-17 | Stop reason: HOSPADM

## 2022-06-17 RX ORDER — DIPHENHYDRAMINE HCL 25 MG
25 CAPSULE ORAL
Status: DISCONTINUED | OUTPATIENT
Start: 2022-06-17 | End: 2022-06-17 | Stop reason: HOSPADM

## 2022-06-17 RX ORDER — ONDANSETRON 2 MG/ML
4 INJECTION INTRAMUSCULAR; INTRAVENOUS EVERY 6 HOURS PRN
Status: DISCONTINUED | OUTPATIENT
Start: 2022-06-17 | End: 2022-06-19 | Stop reason: HOSPADM

## 2022-06-17 RX ORDER — HYDROMORPHONE HYDROCHLORIDE 1 MG/ML
0.5 INJECTION, SOLUTION INTRAMUSCULAR; INTRAVENOUS; SUBCUTANEOUS
Status: DISCONTINUED | OUTPATIENT
Start: 2022-06-17 | End: 2022-06-17 | Stop reason: HOSPADM

## 2022-06-17 RX ORDER — SODIUM CHLORIDE 0.9 % (FLUSH) 0.9 %
3 SYRINGE (ML) INJECTION EVERY 12 HOURS SCHEDULED
Status: DISCONTINUED | OUTPATIENT
Start: 2022-06-17 | End: 2022-06-17 | Stop reason: HOSPADM

## 2022-06-17 RX ORDER — IBUPROFEN 600 MG/1
600 TABLET ORAL ONCE AS NEEDED
Status: DISCONTINUED | OUTPATIENT
Start: 2022-06-17 | End: 2022-06-17 | Stop reason: HOSPADM

## 2022-06-17 RX ORDER — LIDOCAINE HYDROCHLORIDE 20 MG/ML
INJECTION, SOLUTION INFILTRATION; PERINEURAL AS NEEDED
Status: DISCONTINUED | OUTPATIENT
Start: 2022-06-17 | End: 2022-06-17 | Stop reason: SURG

## 2022-06-17 RX ORDER — PROPOFOL 10 MG/ML
VIAL (ML) INTRAVENOUS AS NEEDED
Status: DISCONTINUED | OUTPATIENT
Start: 2022-06-17 | End: 2022-06-17 | Stop reason: SURG

## 2022-06-17 RX ORDER — FENTANYL CITRATE 50 UG/ML
INJECTION, SOLUTION INTRAMUSCULAR; INTRAVENOUS AS NEEDED
Status: DISCONTINUED | OUTPATIENT
Start: 2022-06-17 | End: 2022-06-17 | Stop reason: SURG

## 2022-06-17 RX ORDER — DIPHENHYDRAMINE HYDROCHLORIDE 50 MG/ML
12.5 INJECTION INTRAMUSCULAR; INTRAVENOUS
Status: DISCONTINUED | OUTPATIENT
Start: 2022-06-17 | End: 2022-06-17 | Stop reason: HOSPADM

## 2022-06-17 RX ORDER — PHENYLEPHRINE HYDROCHLORIDE 10 MG/ML
INJECTION INTRAVENOUS AS NEEDED
Status: DISCONTINUED | OUTPATIENT
Start: 2022-06-17 | End: 2022-06-17 | Stop reason: SURG

## 2022-06-17 RX ORDER — HYDROCODONE BITARTRATE AND ACETAMINOPHEN 7.5; 325 MG/1; MG/1
1 TABLET ORAL ONCE AS NEEDED
Status: DISCONTINUED | OUTPATIENT
Start: 2022-06-17 | End: 2022-06-17 | Stop reason: HOSPADM

## 2022-06-17 RX ORDER — ROCURONIUM BROMIDE 10 MG/ML
INJECTION, SOLUTION INTRAVENOUS AS NEEDED
Status: DISCONTINUED | OUTPATIENT
Start: 2022-06-17 | End: 2022-06-17 | Stop reason: SURG

## 2022-06-17 RX ORDER — FLUMAZENIL 0.1 MG/ML
0.2 INJECTION INTRAVENOUS AS NEEDED
Status: DISCONTINUED | OUTPATIENT
Start: 2022-06-17 | End: 2022-06-17 | Stop reason: HOSPADM

## 2022-06-17 RX ORDER — OXYCODONE AND ACETAMINOPHEN 7.5; 325 MG/1; MG/1
1 TABLET ORAL EVERY 4 HOURS PRN
Status: DISCONTINUED | OUTPATIENT
Start: 2022-06-17 | End: 2022-06-17 | Stop reason: HOSPADM

## 2022-06-17 RX ORDER — SODIUM CHLORIDE 0.9 % (FLUSH) 0.9 %
3-10 SYRINGE (ML) INJECTION AS NEEDED
Status: DISCONTINUED | OUTPATIENT
Start: 2022-06-17 | End: 2022-06-17 | Stop reason: HOSPADM

## 2022-06-17 RX ORDER — FAMOTIDINE 10 MG/ML
20 INJECTION, SOLUTION INTRAVENOUS ONCE
Status: COMPLETED | OUTPATIENT
Start: 2022-06-17 | End: 2022-06-17

## 2022-06-17 RX ORDER — EPHEDRINE SULFATE 50 MG/ML
INJECTION, SOLUTION INTRAVENOUS AS NEEDED
Status: DISCONTINUED | OUTPATIENT
Start: 2022-06-17 | End: 2022-06-17 | Stop reason: SURG

## 2022-06-17 RX ORDER — NALOXONE HCL 0.4 MG/ML
0.2 VIAL (ML) INJECTION AS NEEDED
Status: DISCONTINUED | OUTPATIENT
Start: 2022-06-17 | End: 2022-06-17 | Stop reason: HOSPADM

## 2022-06-17 RX ORDER — ONDANSETRON 2 MG/ML
INJECTION INTRAMUSCULAR; INTRAVENOUS AS NEEDED
Status: DISCONTINUED | OUTPATIENT
Start: 2022-06-17 | End: 2022-06-17 | Stop reason: SURG

## 2022-06-17 RX ORDER — CEFAZOLIN SODIUM 2 G/100ML
2 INJECTION, SOLUTION INTRAVENOUS ONCE
Status: COMPLETED | OUTPATIENT
Start: 2022-06-17 | End: 2022-06-17

## 2022-06-17 RX ADMIN — LIDOCAINE HYDROCHLORIDE 100 MG: 20 INJECTION, SOLUTION INFILTRATION; PERINEURAL at 14:08

## 2022-06-17 RX ADMIN — PHENYLEPHRINE HYDROCHLORIDE 200 MCG: 10 INJECTION, SOLUTION INTRAVENOUS at 14:21

## 2022-06-17 RX ADMIN — HYDROMORPHONE HYDROCHLORIDE 0.5 MG: 1 INJECTION, SOLUTION INTRAMUSCULAR; INTRAVENOUS; SUBCUTANEOUS at 02:50

## 2022-06-17 RX ADMIN — PHENYLEPHRINE HYDROCHLORIDE 200 MCG: 10 INJECTION, SOLUTION INTRAVENOUS at 14:32

## 2022-06-17 RX ADMIN — Medication 10 ML: at 08:12

## 2022-06-17 RX ADMIN — ATORVASTATIN CALCIUM 80 MG: 80 TABLET, FILM COATED ORAL at 08:03

## 2022-06-17 RX ADMIN — TERAZOSIN HYDROCHLORIDE 5 MG: 5 CAPSULE ORAL at 20:58

## 2022-06-17 RX ADMIN — ONDANSETRON 4 MG: 2 INJECTION INTRAMUSCULAR; INTRAVENOUS at 15:55

## 2022-06-17 RX ADMIN — PROPOFOL 150 MG: 10 INJECTION, EMULSION INTRAVENOUS at 14:08

## 2022-06-17 RX ADMIN — EPHEDRINE SULFATE 5 MG: 50 INJECTION INTRAVENOUS at 14:45

## 2022-06-17 RX ADMIN — HYDROMORPHONE HYDROCHLORIDE 0.5 MG: 1 INJECTION, SOLUTION INTRAMUSCULAR; INTRAVENOUS; SUBCUTANEOUS at 08:04

## 2022-06-17 RX ADMIN — Medication 10 ML: at 20:58

## 2022-06-17 RX ADMIN — HYDROMORPHONE HYDROCHLORIDE 0.5 MG: 1 INJECTION, SOLUTION INTRAMUSCULAR; INTRAVENOUS; SUBCUTANEOUS at 00:45

## 2022-06-17 RX ADMIN — PHENYLEPHRINE HYDROCHLORIDE 200 MCG: 10 INJECTION, SOLUTION INTRAVENOUS at 14:25

## 2022-06-17 RX ADMIN — APIXABAN 5 MG: 5 TABLET, FILM COATED ORAL at 18:13

## 2022-06-17 RX ADMIN — LISINOPRIL: 20 TABLET ORAL at 08:03

## 2022-06-17 RX ADMIN — CEFAZOLIN SODIUM 2 G: 2 INJECTION, SOLUTION INTRAVENOUS at 13:41

## 2022-06-17 RX ADMIN — SUGAMMADEX 200 MG: 100 INJECTION, SOLUTION INTRAVENOUS at 16:10

## 2022-06-17 RX ADMIN — PROPOFOL 50 MG: 10 INJECTION, EMULSION INTRAVENOUS at 16:01

## 2022-06-17 RX ADMIN — PHENYLEPHRINE HYDROCHLORIDE 200 MCG: 10 INJECTION, SOLUTION INTRAVENOUS at 14:19

## 2022-06-17 RX ADMIN — ROCURONIUM BROMIDE 10 MG: 50 INJECTION INTRAVENOUS at 15:10

## 2022-06-17 RX ADMIN — PHENYLEPHRINE HYDROCHLORIDE 200 MCG: 10 INJECTION, SOLUTION INTRAVENOUS at 14:15

## 2022-06-17 RX ADMIN — FAMOTIDINE 20 MG: 10 INJECTION, SOLUTION INTRAVENOUS at 13:41

## 2022-06-17 RX ADMIN — PHENYLEPHRINE HYDROCHLORIDE 200 MCG: 10 INJECTION, SOLUTION INTRAVENOUS at 14:23

## 2022-06-17 RX ADMIN — PHENYLEPHRINE HYDROCHLORIDE 200 MCG: 10 INJECTION, SOLUTION INTRAVENOUS at 14:17

## 2022-06-17 RX ADMIN — METOPROLOL SUCCINATE 100 MG: 100 TABLET, EXTENDED RELEASE ORAL at 08:03

## 2022-06-17 RX ADMIN — SODIUM CHLORIDE, POTASSIUM CHLORIDE, SODIUM LACTATE AND CALCIUM CHLORIDE 75 ML/HR: 600; 310; 30; 20 INJECTION, SOLUTION INTRAVENOUS at 18:13

## 2022-06-17 RX ADMIN — DILTIAZEM HYDROCHLORIDE 240 MG: 240 CAPSULE, COATED, EXTENDED RELEASE ORAL at 08:03

## 2022-06-17 RX ADMIN — SODIUM CHLORIDE, POTASSIUM CHLORIDE, SODIUM LACTATE AND CALCIUM CHLORIDE 9 ML/HR: 600; 310; 30; 20 INJECTION, SOLUTION INTRAVENOUS at 13:41

## 2022-06-17 RX ADMIN — PHENYLEPHRINE HYDROCHLORIDE 200 MCG: 10 INJECTION, SOLUTION INTRAVENOUS at 14:14

## 2022-06-17 RX ADMIN — ROCURONIUM BROMIDE 50 MG: 50 INJECTION INTRAVENOUS at 14:09

## 2022-06-17 RX ADMIN — DEXAMETHASONE SODIUM PHOSPHATE 6 MG: 10 INJECTION INTRAMUSCULAR; INTRAVENOUS at 15:38

## 2022-06-17 RX ADMIN — HEPARIN SODIUM 3000 UNITS: 1000 INJECTION INTRAVENOUS; SUBCUTANEOUS at 15:37

## 2022-06-17 RX ADMIN — FENTANYL CITRATE 100 MCG: 50 INJECTION INTRAMUSCULAR; INTRAVENOUS at 14:02

## 2022-06-17 RX ADMIN — PHENYLEPHRINE HYDROCHLORIDE 1 MCG/KG/MIN: 10 INJECTION INTRAVENOUS at 14:28

## 2022-06-17 RX ADMIN — HYDROMORPHONE HYDROCHLORIDE 0.5 MG: 1 INJECTION, SOLUTION INTRAMUSCULAR; INTRAVENOUS; SUBCUTANEOUS at 10:16

## 2022-06-17 RX ADMIN — HYDROMORPHONE HYDROCHLORIDE 0.5 MG: 1 INJECTION, SOLUTION INTRAMUSCULAR; INTRAVENOUS; SUBCUTANEOUS at 05:34

## 2022-06-17 RX ADMIN — HEPARIN SODIUM 7000 UNITS: 1000 INJECTION INTRAVENOUS; SUBCUTANEOUS at 14:53

## 2022-06-17 RX ADMIN — PHENYLEPHRINE HYDROCHLORIDE 200 MCG: 10 INJECTION, SOLUTION INTRAVENOUS at 14:28

## 2022-06-17 NOTE — PROGRESS NOTES
Name: Yehuda Weaver ADMIT: 6/15/2022   : 1955  PCP: Koby Hernandez MD    MRN: [id number] LOS: 1 days   AGE/SEX: 67 y.o. male  ROOM: [facility] OR/MAIN OR     Subjective   Subjective   Had increased foot pain overnight and decreased doppler signal; heparin was adjusted with resolution. No complaints on exam. Wife present    Review of Systems   Constitutional: Negative for chills and fever.   HENT: Negative for congestion.    Respiratory: Negative for shortness of breath.    Cardiovascular: Negative for chest pain.   Gastrointestinal: Negative for nausea.   Genitourinary: Negative for difficulty urinating.        Catheter in place   Musculoskeletal: Negative for arthralgias and myalgias.   Skin: Negative for rash.   Neurological: Negative for headaches.   Psychiatric/Behavioral: Negative for sleep disturbance.        Objective   Objective   Vital Signs  Temp:  [97.6 °F (36.4 °C)-98.1 °F (36.7 °C)] 98.1 °F (36.7 °C)  Heart Rate:  [] 80  Resp:  [16] 16  BP: (101-150)/(58-85) 121/65  Arterial Line BP: (117-180)/(36-75) 120/36  SpO2:  [91 %-96 %] 95 %  on  Flow (L/min):  [2-4] 2;   Device (Oxygen Therapy): nasal cannula  Body mass index is 29.13 kg/m².  Physical Exam  Vitals and nursing note reviewed.   Constitutional:       General: He is not in acute distress.     Appearance: He is ill-appearing. He is not toxic-appearing.   HENT:      Head: Normocephalic.      Mouth/Throat:      Mouth: Mucous membranes are moist.   Eyes:      Conjunctiva/sclera: Conjunctivae normal.   Cardiovascular:      Rate and Rhythm: Normal rate and regular rhythm.   Pulmonary:      Effort: Pulmonary effort is normal. No respiratory distress.      Breath sounds: No wheezing or rales.   Abdominal:      General: Bowel sounds are normal.      Palpations: Abdomen is soft.   Musculoskeletal:      Cervical back: Neck supple.      Right lower leg: No edema.      Left lower leg: No edema.   Skin:     General: Skin is warm and dry.    Neurological:      Mental Status: He is alert and oriented to person, place, and time.   Psychiatric:         Mood and Affect: Mood normal.         Behavior: Behavior normal.         Results Review     I reviewed the patient's new clinical results.  Results from last 7 days   Lab Units 06/17/22  1204 06/17/22  0802 06/17/22  0416 06/16/22  2354   WBC 10*3/mm3 11.07* 11.16* 11.69* 16.19*   HEMOGLOBIN g/dL 13.8 13.8 13.8 14.4   PLATELETS 10*3/mm3 120* 117* 126* 128*     Results from last 7 days   Lab Units 06/17/22  0416 06/16/22  1558 06/16/22  0429 06/15/22  1630   SODIUM mmol/L 138 139 139 140   POTASSIUM mmol/L 3.8 3.7 3.9 4.1   CHLORIDE mmol/L 104 105 104 104   CO2 mmol/L 27.0 26.0 25.0 22.4   BUN mg/dL 11 10 12 15   CREATININE mg/dL 0.67* 0.72* 0.76 0.85   GLUCOSE mg/dL 119* 99 105* 77   EGFR mL/min/1.73 102.3 100.1 98.5 95.2     Results from last 7 days   Lab Units 06/15/22  1630   ALBUMIN g/dL 4.30   BILIRUBIN mg/dL 0.3   ALK PHOS U/L 87   AST (SGOT) U/L 25   ALT (SGPT) U/L 38     Results from last 7 days   Lab Units 06/17/22  0416 06/16/22  1558 06/16/22  0429 06/15/22  1630   CALCIUM mg/dL 7.9* 8.2* 8.6 9.1   ALBUMIN g/dL  --   --   --  4.30       No results found for: HGBA1C, POCGLU    CT Angio Abdominal Aorta Bilateral Iliofem Runoff    Result Date: 6/15/2022   1. Occlusion of the right superficial femoral artery stent, with thready opacification of the arteries of the right lower leg by collateral flow. Areas of arterial narrowing, as detailed above. 2. Incidental findings as noted.  Discussed by telephone with Juancho Cruz at time of interpretation, 1910, 06/15/2022.  This report was finalized on 6/15/2022 7:13 PM by Dr. Vikas Appiah M.D.      Scheduled Medications  atorvastatin, 80 mg, Oral, Daily  dilTIAZem CD, 240 mg, Oral, Q24H  lisinopril-hydroCHLOROthiazide (ZESTORETIC) 20-25 mg combo dose, , Oral, Q24H  metoprolol succinate XL, 100 mg, Oral, Q24H  pantoprazole, 40 mg, Oral, QAM  sodium  chloride, 10 mL, Intravenous, Q12H  sodium chloride, 3 mL, Intravenous, Q12H  terazosin, 5 mg, Oral, Nightly    Infusions  alteplase (ACTIVASE) 10 mg in 0.9% NaCl 250 mL- Cathflow for Angiography, 1 mg/hr, Last Rate: 0.5 mg/hr (06/16/22 1715)  alteplase (ACTIVASE) 20 mg in 0.9% NaCl 500 mL, 1 mg/hr, Last Rate: 1 mg/hr (06/16/22 1531)  heparin, 11.8 Units/kg/hr, Last Rate: 15 Units/kg/hr (06/16/22 0601)  heparin, 500 Units/hr, Last Rate: 500 Units/hr (06/16/22 1638)  lactated ringers, 75 mL/hr, Last Rate: 9 mL/hr (06/17/22 1341)  lactated ringers, 9 mL/hr, Last Rate: Stopped (06/16/22 1358)  lactated ringers, 9 mL/hr  niCARdipine, 5-15 mg/hr  phenylephrine, 0.5-3 mcg/kg/min  sodium chloride, 35 mL/hr, Last Rate: 35 mL/hr (06/16/22 1531)    Diet  NPO Diet NPO Type: Ice chips       Assessment/Plan     Active Hospital Problems    Diagnosis  POA   • **Atherosclerosis of native arteries of extremities with intermittent claudication, bilateral legs (HCC) [I70.213]  Yes   • Tobacco dependence due to cigarettes [F17.210]  Yes   • Arterial stent thrombosis, initial encounter ([facility]) [T82.868A]  Yes   • Coronary artery disease [I25.10]  Yes   • Hypertension [I10]  Yes   • Hyperlipidemia [E78.5]  Yes   • Chronic obstructive pulmonary disease (HCC) [J44.9]  Yes      Resolved Hospital Problems   No resolved problems to display.       67 y.o. male admitted with Atherosclerosis of native arteries of extremities with intermittent claudication, bilateral legs (HCC).    Right superficial femoral artery stent thrombosis  Peripheral arterial disease  Appreciate Vascular assistance  Remains on heparin. S/P RLE angiogram, mechanical thrombectomy, angiogram of proximal SFA, placement of EKOS catheter on 6/16. Going back to OR today for angiogram and any additional intervention/catheter removal  Aspirin 81 on hold     Coronary artery disease  Status post CABG  Hold ASA, resume Statin and BB     Hypertension  Resume home diltiazem, lisinopril,  HCTZ  BP acceptable     Hyperlipidemia  Resume statin      Tobacco use disorder  Current ppd smoker, has ~50 pack year history  Refuses nicotine patch at this time       · heparin gtt for DVT prophylaxis.  · Full code.  · Discussed with patient and family.  · Anticipate discharge TBD       OSCAR Cabrera  [facility]  06/17/22  14:17 EDT

## 2022-06-17 NOTE — ANESTHESIA PREPROCEDURE EVALUATION
Anesthesia Evaluation     Patient summary reviewed and Nursing notes reviewed   NPO Solid Status: > 8 hours  NPO Liquid Status: > 2 hours           Airway   Mallampati: I  TM distance: >3 FB  Neck ROM: full  Dental    (+) upper dentures    Pulmonary    (+) COPD moderate,   Cardiovascular     ECG reviewed  Beta blocker given within 24 hours of surgery    (+) hypertension, past MI , CAD, CABG >6 Months, PVD, hyperlipidemia,       Neuro/Psych- negative ROS  GI/Hepatic/Renal/Endo    (+)  GERD well controlled,    (-) hepatitis, liver disease, no renal disease, diabetes, no thyroid disorder    Musculoskeletal     Abdominal    Substance History      OB/GYN          Other      history of cancer (hx bladder ca)                      Anesthesia Plan    ASA 3     general       Anesthetic plan, risks, benefits, and alternatives have been provided, discussed and informed consent has been obtained with: patient.        CODE STATUS:

## 2022-06-17 NOTE — PROGRESS NOTES
Patient had mechanical thrombectomy and placement of EKOS catheter yesterday for acute thrombosis of right superficial femoral and popliteal artery stents.  Initially in the recovery room he had a dorsalis pedis Doppler signal which disappeared during the evening.  His Activase drip was increased and there was return of the signal.  Initially he had burning pain in the foot which has resolved.    His access site looks clean with no bleeding.  There is a multiphasic Doppler signal in the dorsalis pedis artery and a faint multiphasic signal in the posterior tibial artery now.  Foot is warm and well-perfused with good capillary refill.    It appears that his residual thrombus has resolved/resolving.  Plans are to return to the operating room in the early afternoon for angiogram and any intervention as needed with catheter removal.  This was discussed in detail with the patient and his significant other in the room with him.  They are in agreement with the plan.

## 2022-06-17 NOTE — PLAN OF CARE
Goal Outcome Evaluation:   VSS. A&OX4. EKOS in place. Pt on bedrest. Pain now controlled with PRN dilaudid. No s/s of bleeding. DP pulses present. Adam intact draining clear, yellow urine. Plans for EKOs removal today. Call light in reach.

## 2022-06-17 NOTE — PLAN OF CARE
Goal Outcome Evaluation:  Plan of Care Reviewed With: patient        Progress: improving  Outcome Evaluation: Pt post op day 0 from EKOS removal. Pedal pulses dopplerable bilaterally. Pain 2/10 currently, pt resting in bed. Left groin site CDI with scant amount of drainage on dressing. HOB flat until 1830. Adam in place. Pt to resume eliquis tonight. Significant other at bedside.

## 2022-06-17 NOTE — ANESTHESIA POSTPROCEDURE EVALUATION
"Patient: Yehuda Weaver    Procedure Summary     Date: 06/17/22 Room / Location:  [facility] OR [facility] /  [facility] OR 18/19    Anesthesia Start: 1352 Anesthesia Stop: 1622    Procedure: EKOS CATHETER REMOVAL, RIGHT LOWER EXTREMITY ARTERIOGRAM WITH PERCUTANEOUS TRANSLUMINAL ANGIOPLASTY, ASPIRATION PNEUMBRA THROMBECTOMY, AND IVUS (N/A ) Diagnosis:     Surgeons: Yehuda Salgado MD Provider: Amos Wong MD    Anesthesia Type: general ASA Status: 3          Anesthesia Type: general    Vitals  Vitals Value Taken Time   /68 06/17/22 1646   Temp 37.6 °C (99.6 °F) 06/17/22 1621   Pulse 85 06/17/22 1649   Resp 16 06/17/22 1645   SpO2 91 % 06/17/22 1649   Vitals shown include unvalidated device data.        Post Anesthesia Care and Evaluation    Patient location during evaluation: bedside  Patient participation: complete - patient participated  Level of consciousness: awake  Pain management: adequate    Airway patency: patent  Anesthetic complications: No anesthetic complications  PONV Status: controlled  Cardiovascular status: acceptable  Respiratory status: acceptable  Hydration status: acceptable    Comments: /68 (BP Location: Right arm, Patient Position: Lying)   Pulse 83   Temp 37.6 °C (99.6 °F) (Oral)   Resp 16   Ht 170.2 cm (67\")   Wt 84.4 kg (186 lb)   SpO2 93%   BMI 29.13 kg/m²         "

## 2022-06-17 NOTE — ANESTHESIA PROCEDURE NOTES
Airway  Urgency: elective    Date/Time: 6/17/2022 2:12 PM    General Information and Staff    Patient location during procedure: OR  Anesthesiologist: Yehuda Keating MD  CRNA/CAA: Abiola Diego CRNA    Indications and Patient Condition  Indications for airway management: airway protection    Preoxygenated: yes  Mask difficulty assessment: 3 - difficult mask (inadequate, unstable or two providers) +/- NMBA    Final Airway Details  Final airway type: endotracheal airway      Successful airway: ETT  Cuffed: yes   Successful intubation technique: direct laryngoscopy  Facilitating devices/methods: cricoid pressure  Endotracheal tube insertion site: oral  Blade: Gagandeep  Blade size: 4  ETT size (mm): 7.5  Cormack-Lehane Classification: grade I - full view of glottis  Placement verified by: chest auscultation and capnometry   Cuff volume (mL): 5  Measured from: lips  ETT/EBT  to lips (cm): 23  Number of attempts at approach: 2  Assessment: lips, teeth, and gum same as pre-op    Additional Comments  EBBS: ETCO2+: Atraumatic intubation

## 2022-06-17 NOTE — OP NOTE
Operative Note  Location: [facility]  Date of Admission:  6/15/2022  OR Date: 6/17/2022    Pre-op Diagnosis:  History of right femoral-popliteal stent thrombosis    Post-op Diagnosis:  History of right femoral-popliteal stent thrombosis    Procedure:   1.  Removal of right lower extremity thrombolysis infusion catheter with right lower extremity arteriogram  2.  Right femoral-popliteal angioplasty to 5 mm.  3.  Percutaneous aspiration thrombectomy of posterior tibial artery, tibioperoneal trunk and popliteal artery using Penumbra Cat 6 device  4.  Intravascular ultrasound of the right superficial femoral artery stent system  5.  Successful deployment of Perclose percutaneous closure device left groin.    Surgeon: Yehuda Salgado MD    Assistant: Chacha LIN    Anesthesia: General    Staff:   Circulator: Bella Sarmiento RN; Claudia Day RN  Radiology Technologist: Lacey Dey  Scrub Person: Mignon Villa  Assistant: Chacha Guaman CSA  Vascular Radiology Technician: Anel Menon, ROSE; Maxime Lee    Estimated Blood Loss: minimal    Specimen: None    Complications: None    Findings: Successful thrombolysis, with patency of the femoral-popliteal segment restored.  Mild atherosclerotic plaque without stenosis in the distal common femoral artery.  Mild residual or recurrent stenosis of the above-knee popliteal artery and distal superficial femoral artery.  Occlusion of the femoral-popliteal segment following balloon angioplasty of the femoral-popliteal arterial segment.  Successful restoration of patency following Penumbra aspiration thrombectomy using CAT 6 device.  Wire entanglement and double barrel stents in self-expanding stents in the proximal to mid thigh, evaluated with intravascular ultrasound.    Implants: Nothing was implanted during the procedure    Indications: 67-year-old gentleman with history of peripheral arterial disease of the lower extremities, treated  with femoral-popliteal stent angioplasties, admitted through emergency room with acute right lower extremity ischemia.  Foot was viable on presentation and the following day he underwent percutaneous aspiration thrombectomy and placement of thrombolysis infusion catheter.  Returns now for catheter check.       Procedure:  The patient was given Kefzol IV.  He was transferred to the operating room and positioned supine the operating table.  After the induction of general anesthesia, the ultrasound core of the EKOS thrombolysis system was removed and the catheter was capped.  I washed the lower abdomen, groins and the external components of the 7 Macedonian sheath and lysis catheter with Hibiclens.  The area was then prepared with ChloraPrep and draped in a sterile fashion.  The thrombolysis catheter was transected and I passed a wire through the catheter, and the catheter was removed over a wire.  The pre-existing sheath was removed over wire and a fresh 7 Macedonian sheath was placed.  The tip of the catheter was at the femoral bifurcation.  A right lower extremity runoff arteriogram was performed through the sheath, demonstrating patency of the femoral-popliteal segments, with mild stenosis in the distal SFA and above-knee popliteal artery.  The anterior tibial artery was patent down the extremity, and across the ankle as the dorsalis pedis artery and appeared to be the dominant runoff artery.  The peroneal artery was patent but diminutive.  The posterior tibial artery appeared to be occluded in the mid calf.  I was not sure about what to do with the posterior tibial artery but intended to perform balloon angioplasty of the femoral-popliteal segment.  A 5 x 80 mm balloon was advanced over the wire and balloon angioplasty was performed with the leading edge of the balloon at the level of the patella proximally, with balloon angioplasty performed to the level of the second stent in the distal thigh.  The balloon was removed  and follow-up study demonstrated occlusion of the below-knee popliteal artery, with no opacification of the tibial arteries.  The proximal thigh was then imaged, demonstrating patent SFA, but sluggish flow.  Arrangements were made for using the penumbra aspiration thrombectomy system.  I passed a long Navicross catheter over the wire and had some difficulty passing the catheter through the thigh.  It appeared that a double barrel stent system was present.  The catheter was positioned in the below-knee popliteal artery, and using knowledge of proximal tibial artery anatomy as demonstrated on the initial arteriogram, I obtained wire passage into the posterior tibial artery.  The Navicross catheter was advanced and the wire removed.  A posterior tibial arteriogram demonstrated patency and successful PT cannulation.  A 300 cm 0.014 inch wire was passed through the Navicross catheter and removed.  The Cat 6 percutaneous aspiration thrombectomy device was activated, beginning in the popliteal artery above the knee, and extending into the proximal to mid posterior tibial artery.  The penumbra device was removed and a follow-up study demonstrated restored patency of the popliteal and tibial arteries, with suggestion of vasospasm.  Attention was then redirected to the thigh.  It was difficult to determine what was going on and whether I was intraluminal.  In addition I had had difficulty passing the catheter through the thigh.  To define anatomy better, I performed intravascular ultrasound and confirmed that we had obtained successful guidewire passage through the true lumen at the proximal, trailing level of the stents, but the wire passed through the interstices of the mid thigh.  The wire was removed and redirected distally without difficulty.  A final completion set of images was obtained, with imaging of the distal common femoral artery to the foot, demonstrating patency of the femoral-popliteal segments, with  opacification of all 3 tibial arteries, but with dominant runoff through the anterior tibial artery, which crossed the ankle as the dorsalis pedis artery, and which reconstituted the plantar arch.  The result was accepted.  The 0.035 inch guidewire was replaced in the sheath and the sheath was withdrawn so that the tip was in the left common iliac artery.  The wire was advanced into the aorta.  The sheath was then removed and I deployed a ProGlide Perclose device in the left groin with good success.  Strong, multiphasic dorsalis pedis artery signals were present bilaterally.  The heparin was not reversed with protamine.  At its conclusion the patient had tolerated the procedure well, and without apparent complications.  The patient was taken to the recovery area in stable condition.    Yehuda Salgado MD     Date: 6/17/2022  Time: 16:35 EDT

## 2022-06-18 LAB
ACT BLD: 126 SECONDS (ref 82–152)
ACT BLD: 219 SECONDS (ref 82–152)
ACT BLD: 254 SECONDS (ref 82–152)
ANION GAP SERPL CALCULATED.3IONS-SCNC: 10.1 MMOL/L (ref 5–15)
BASOPHILS # BLD AUTO: 0.01 10*3/MM3 (ref 0–0.2)
BASOPHILS NFR BLD AUTO: 0.1 % (ref 0–1.5)
BUN SERPL-MCNC: 11 MG/DL (ref 8–23)
BUN/CREAT SERPL: 16.2 (ref 7–25)
CALCIUM SPEC-SCNC: 8 MG/DL (ref 8.6–10.5)
CHLORIDE SERPL-SCNC: 102 MMOL/L (ref 98–107)
CO2 SERPL-SCNC: 25.9 MMOL/L (ref 22–29)
CREAT SERPL-MCNC: 0.68 MG/DL (ref 0.76–1.27)
DEPRECATED RDW RBC AUTO: 43.6 FL (ref 37–54)
EGFRCR SERPLBLD CKD-EPI 2021: 101.9 ML/MIN/1.73
EOSINOPHIL # BLD AUTO: 0 10*3/MM3 (ref 0–0.4)
EOSINOPHIL NFR BLD AUTO: 0 % (ref 0.3–6.2)
ERYTHROCYTE [DISTWIDTH] IN BLOOD BY AUTOMATED COUNT: 13.4 % (ref 12.3–15.4)
GLUCOSE SERPL-MCNC: 132 MG/DL (ref 65–99)
HCT VFR BLD AUTO: 39.8 % (ref 37.5–51)
HGB BLD-MCNC: 13.4 G/DL (ref 13–17.7)
IMM GRANULOCYTES # BLD AUTO: 0.07 10*3/MM3 (ref 0–0.05)
IMM GRANULOCYTES NFR BLD AUTO: 0.6 % (ref 0–0.5)
LYMPHOCYTES # BLD AUTO: 0.95 10*3/MM3 (ref 0.7–3.1)
LYMPHOCYTES NFR BLD AUTO: 7.7 % (ref 19.6–45.3)
MCH RBC QN AUTO: 30.1 PG (ref 26.6–33)
MCHC RBC AUTO-ENTMCNC: 33.7 G/DL (ref 31.5–35.7)
MCV RBC AUTO: 89.4 FL (ref 79–97)
MONOCYTES # BLD AUTO: 0.87 10*3/MM3 (ref 0.1–0.9)
MONOCYTES NFR BLD AUTO: 7.1 % (ref 5–12)
NEUTROPHILS NFR BLD AUTO: 10.43 10*3/MM3 (ref 1.7–7)
NEUTROPHILS NFR BLD AUTO: 84.5 % (ref 42.7–76)
NRBC BLD AUTO-RTO: 0 /100 WBC (ref 0–0.2)
PLATELET # BLD AUTO: 125 10*3/MM3 (ref 140–450)
PMV BLD AUTO: 10.8 FL (ref 6–12)
POTASSIUM SERPL-SCNC: 4 MMOL/L (ref 3.5–5.2)
RBC # BLD AUTO: 4.45 10*6/MM3 (ref 4.14–5.8)
SODIUM SERPL-SCNC: 138 MMOL/L (ref 136–145)
WBC NRBC COR # BLD: 12.33 10*3/MM3 (ref 3.4–10.8)

## 2022-06-18 PROCEDURE — 80048 BASIC METABOLIC PNL TOTAL CA: CPT | Performed by: SURGERY

## 2022-06-18 PROCEDURE — 85025 COMPLETE CBC W/AUTO DIFF WBC: CPT | Performed by: SURGERY

## 2022-06-18 RX ADMIN — APIXABAN 5 MG: 5 TABLET, FILM COATED ORAL at 21:22

## 2022-06-18 RX ADMIN — ATORVASTATIN CALCIUM 80 MG: 80 TABLET, FILM COATED ORAL at 08:11

## 2022-06-18 RX ADMIN — TERAZOSIN HYDROCHLORIDE 5 MG: 5 CAPSULE ORAL at 21:22

## 2022-06-18 RX ADMIN — METOPROLOL SUCCINATE 100 MG: 100 TABLET, EXTENDED RELEASE ORAL at 10:04

## 2022-06-18 RX ADMIN — Medication 10 ML: at 21:22

## 2022-06-18 RX ADMIN — Medication 10 ML: at 08:13

## 2022-06-18 RX ADMIN — SODIUM CHLORIDE, POTASSIUM CHLORIDE, SODIUM LACTATE AND CALCIUM CHLORIDE 75 ML/HR: 600; 310; 30; 20 INJECTION, SOLUTION INTRAVENOUS at 07:42

## 2022-06-18 RX ADMIN — APIXABAN 5 MG: 5 TABLET, FILM COATED ORAL at 08:11

## 2022-06-18 RX ADMIN — LISINOPRIL: 20 TABLET ORAL at 10:03

## 2022-06-18 RX ADMIN — DILTIAZEM HYDROCHLORIDE 240 MG: 240 CAPSULE, COATED, EXTENDED RELEASE ORAL at 10:03

## 2022-06-18 RX ADMIN — PANTOPRAZOLE SODIUM 40 MG: 40 TABLET, DELAYED RELEASE ORAL at 06:01

## 2022-06-18 NOTE — PROGRESS NOTES
Name: Yehuda Weaver ADMIT: 6/15/2022   : 1955  PCP: Koby Hernandez MD    MRN: [id number] LOS: 2 days   AGE/SEX: 67 y.o. male  ROOM: [facility]     Subjective   Subjective   Patient is lying on the bed and is not in any distress.  Denies nausea, vomiting, abdominal pain, chest pain, leg pain.     Objective   Objective   Vital Signs  Temp:  [97.7 °F (36.5 °C)-99.6 °F (37.6 °C)] 97.8 °F (36.6 °C)  Heart Rate:  [] 68  Resp:  [16] 16  BP: ()/(50-77) 110/61  Arterial Line BP: ()/(38-60) 133/59  SpO2:  [90 %-94 %] 91 %  on  Flow (L/min):  [2-4] 2;   Device (Oxygen Therapy): room air  Body mass index is 29.13 kg/m².  Physical Exam  Vitals and nursing note reviewed.   Constitutional:       General: He is not in acute distress.     Appearance: He is ill-appearing. He is not toxic-appearing.   HENT:      Head: Normocephalic.      Mouth/Throat:      Mouth: Mucous membranes are moist.   Eyes:      Conjunctiva/sclera: Conjunctivae normal.   Cardiovascular:      Rate and Rhythm: Normal rate and regular rhythm.   Pulmonary:      Effort: Pulmonary effort is normal. No respiratory distress.      Breath sounds: No wheezing or rales.   Abdominal:      General: Bowel sounds are normal.      Palpations: Abdomen is soft.   Musculoskeletal:      Cervical back: Normal range of motion and neck supple.      Right lower leg: No edema.      Left lower leg: No edema.   Skin:     General: Skin is warm and dry.   Neurological:      General: No focal deficit present.      Mental Status: He is alert and oriented to person, place, and time.   Psychiatric:         Mood and Affect: Mood normal.         Behavior: Behavior normal.         Results Review     I reviewed the patient's new clinical results.  Results from last 7 days   Lab Units 22  0602 22  1204 22  0802 22  0416   WBC 10*3/mm3 12.33* 11.07* 11.16* 11.69*   HEMOGLOBIN g/dL 13.4 13.8 13.8 13.8   PLATELETS 10*3/mm3 125* 120* 117* 126*      Results from last 7 days   Lab Units 06/18/22  0602 06/17/22  0416 06/16/22  1558 06/16/22  0429   SODIUM mmol/L 138 138 139 139   POTASSIUM mmol/L 4.0 3.8 3.7 3.9   CHLORIDE mmol/L 102 104 105 104   CO2 mmol/L 25.9 27.0 26.0 25.0   BUN mg/dL 11 11 10 12   CREATININE mg/dL 0.68* 0.67* 0.72* 0.76   GLUCOSE mg/dL 132* 119* 99 105*   EGFR mL/min/1.73 101.9 102.3 100.1 98.5     Results from last 7 days   Lab Units 06/15/22  1630   ALBUMIN g/dL 4.30   BILIRUBIN mg/dL 0.3   ALK PHOS U/L 87   AST (SGOT) U/L 25   ALT (SGPT) U/L 38     Results from last 7 days   Lab Units 06/18/22  0602 06/17/22  0416 06/16/22  1558 06/16/22  0429 06/15/22  1630   CALCIUM mg/dL 8.0* 7.9* 8.2* 8.6 9.1   ALBUMIN g/dL  --   --   --   --  4.30       No results found for: HGBA1C, POCGLU    No radiology results for the last day  Scheduled Medications  apixaban, 5 mg, Oral, Q12H  atorvastatin, 80 mg, Oral, Daily  dilTIAZem CD, 240 mg, Oral, Q24H  lisinopril-hydroCHLOROthiazide (ZESTORETIC) 20-25 mg combo dose, , Oral, Q24H  metoprolol succinate XL, 100 mg, Oral, Q24H  pantoprazole, 40 mg, Oral, QAM  sodium chloride, 10 mL, Intravenous, Q12H  terazosin, 5 mg, Oral, Nightly    Infusions  lactated ringers, 75 mL/hr, Last Rate: 75 mL/hr (06/18/22 0742)  lactated ringers, 9 mL/hr, Last Rate: Stopped (06/16/22 1358)  niCARdipine, 5-15 mg/hr  phenylephrine, 0.5-3 mcg/kg/min    Diet  Diet Regular; Cardiac       Assessment/Plan     Active Hospital Problems    Diagnosis  POA   • **Atherosclerosis of native arteries of extremities with intermittent claudication, bilateral legs (HCC) [I70.213]  Yes   • Tobacco dependence due to cigarettes [F17.210]  Yes   • Arterial stent thrombosis, initial encounter ([facility]) [T82.868A]  Yes   • Coronary artery disease [I25.10]  Yes   • Hypertension [I10]  Yes   • Hyperlipidemia [E78.5]  Yes   • Chronic obstructive pulmonary disease (HCC) [J44.9]  Yes      Resolved Hospital Problems   No resolved problems to display.        67 y.o. male admitted with Atherosclerosis of native arteries of extremities with intermittent claudication, bilateral legs (HCC).    Right superficial femoral artery stent thrombosis  Peripheral arterial disease  Appreciate Vascular assistance  S/P RLE angiogram, mechanical thrombectomy, angiogram of proximal SFA, placement of EKOS catheter on 6/16.  On 617 patient underwent removal of the EKOS catheter, right femoral-popliteal angioplasty to 5 mm, percutaneous aspiration thrombectomy of the posttibial artery, tibioperoneal trunk and popliteal artery.  Off heparin drip and is currently on Eliquis and statins and will be continued.     Coronary artery disease  Status post CABG  Resume aspirin upon discharge and will continue with Eliquis and statins.     Hypertension  Blood pressure is reasonably well controlled and is on Cardizem, lisinopril/HCTZ along with metoprolol.     Hyperlipidemia   statin      Tobacco use disorder  Current ppd smoker, has ~50 pack year history  Declines nicotine patch and counseled him to quit smoking.       · heparin gtt for DVT prophylaxis.  · Full code.  · Discussed with patient and family.  · Anticipate discharge in AM       Sage Smith MD  [facility]  06/18/22  15:27 EDT

## 2022-06-18 NOTE — PLAN OF CARE
Goal Outcome Evaluation:  Plan of Care Reviewed With: patient, spouse        Progress: improving  Outcome Evaluation: Vitals stable. Currently on room air. Pain controlled with prn meds. Left groin dressing CDI, no drainage. Pt voiding well. Up ad jennifer in room. Pt to d/c home tomorrow morning.

## 2022-06-18 NOTE — PROGRESS NOTES
Name: Yehuda Weaver ADMIT: 6/15/2022   : 1955  PCP: Koby Hernandez MD    MRN: [id number] LOS: 2 days   AGE/SEX: 67 y.o. male  ROOM: [address]    [place], Subsequent Hospital Care    67 y.o. male s/p thrombolysis, mechanical thrombectomy, and balloon angioplasty of the right lower extremity for occluded right SFA stents.    Doing well.  Right leg pain much improved, some soreness.  Has not been ambulatory at time of my visit.    Scheduled Medications:   apixaban, 5 mg, Oral, Q12H  atorvastatin, 80 mg, Oral, Daily  dilTIAZem CD, 240 mg, Oral, Q24H  lisinopril-hydroCHLOROthiazide (ZESTORETIC) 20-25 mg combo dose, , Oral, Q24H  metoprolol succinate XL, 100 mg, Oral, Q24H  pantoprazole, 40 mg, Oral, QAM  sodium chloride, 10 mL, Intravenous, Q12H  terazosin, 5 mg, Oral, Nightly      Active Infusions:  lactated ringers, 75 mL/hr, Last Rate: 75 mL/hr (22 0742)  lactated ringers, 9 mL/hr, Last Rate: Stopped (22 1358)  niCARdipine, 5-15 mg/hr  phenylephrine, 0.5-3 mcg/kg/min      Vital Signs  Vital Signs Patient Vitals for the past 24 hrs:   BP Temp Temp src Pulse Resp SpO2   22 1440 -- -- -- 68 -- 91 %   22 1300 116/65 -- -- 73 -- --   22 1100 110/61 97.8 °F (36.6 °C) Oral 72 16 94 %   22 1003 113/71 -- -- 76 -- --   22 0930 109/57 -- -- 70 -- 92 %   22 0900 109/60 -- -- 72 -- 92 %   22 0812 106/62 -- -- 77 -- 91 %   22 0758 92/57 97.8 °F (36.6 °C) Oral 83 16 --   22 2232 116/60 97.7 °F (36.5 °C) Oral 80 16 93 %     I/O:  I/O last 3 completed shifts:  In: 2361.3 [P.O.:600; I.V.:1761.3]  Out: 4350 [Urine:4350]  Physical Exam:    Physical Exam   Palpable femoral pulses bilaterally, Doppler right dorsalis pedis signal with multiphasic signal, palpable left dorsalis pedis pulse feet warm with brisk cap refill  Femoral puncture sites clean dry intact with no hematoma or drainage.  CBC    Results from last 7 days   Lab Units  06/18/22  0602 06/17/22  1204 06/17/22  0802 06/17/22  0416 06/16/22  2354 06/16/22  2019 06/16/22  1558   WBC 10*3/mm3 12.33* 11.07* 11.16* 11.69* 16.19* 14.74* 12.67*   HEMOGLOBIN g/dL 13.4 13.8 13.8 13.8 14.4 15.1 14.9   PLATELETS 10*3/mm3 125* 120* 117* 126* 128* 149 167     BMP   Results from last 7 days   Lab Units 06/18/22  0602 06/17/22  0416 06/16/22  1558 06/16/22  0429 06/15/22  1630   SODIUM mmol/L 138 138 139 139 140   POTASSIUM mmol/L 4.0 3.8 3.7 3.9 4.1   CHLORIDE mmol/L 102 104 105 104 104   CO2 mmol/L 25.9 27.0 26.0 25.0 22.4   BUN mg/dL 11 11 10 12 15   CREATININE mg/dL 0.68* 0.67* 0.72* 0.76 0.85   GLUCOSE mg/dL 132* 119* 99 105* 77     Cr Clearance Estimated Creatinine Clearance: 109.4 mL/min (A) (by C-G formula based on SCr of 0.68 mg/dL (L)).  Assessment & Plan   Assessment & Plan    Atherosclerosis of native arteries of extremities with intermittent claudication, bilateral legs (HCC)    Chronic obstructive pulmonary disease (HCC)    Coronary artery disease    Hyperlipidemia    Hypertension    Arterial stent thrombosis, initial encounter ([facility])    Tobacco dependence due to cigarettes    67 y.o. male that is post thrombolysis, mechanical thrombectomy, and balloon angioplasty for right leg occluded SFA stent.    Doing well post procedure.  Arterial line discontinued.  He has been initiated on Eliquis for anticoagulation.  He is on a high-dose statin.  If he continues to do well, possible hospital discharge tomorrow.    Personal protective equipment used for this patient encounter:  Patient wearing surgical mask []    Provider wearing a surgical mask [x]    Gloves []    Eye protection []    Face Shield []    Gown []    N 95 respirator or CAPR/PAPR []   Duration of interaction 10 mins    Wen Vilchis MD  Vascular Surgery  [facility]  O: [phone]  F: [phone]      Please call my office with any question: [phone]    Active Hospital Problems    Diagnosis  POA   •  **Atherosclerosis of native arteries of extremities with intermittent claudication, bilateral legs ([facility]) [I70.213]  Yes   • Tobacco dependence due to cigarettes [F17.210]  Yes   • Arterial stent thrombosis, initial encounter ([facility]) [T82.868A]  Yes   • Coronary artery disease [I25.10]  Yes   • Hypertension [I10]  Yes   • Hyperlipidemia [E78.5]  Yes   • Chronic obstructive pulmonary disease ([facility]) [J44.9]  Yes      Resolved Hospital Problems   No resolved problems to display.

## 2022-06-18 NOTE — PLAN OF CARE
Goal Outcome Evaluation:   VSS. A&OX4. No c/o pain or discomfort. All pulses present with doppler. Brenton feet warm. Right groin site bleeding at start of shift, pressure held for 5 minutes and bleeding stopped. New dressing applied. Minimal bruising noted, site is soft. Remains on 02 @ 2lpm. Adam cath to be removed this AM. Call light in reach.

## 2022-06-19 ENCOUNTER — READMISSION MANAGEMENT (OUTPATIENT)
Dept: CALL CENTER | Facility: HOSPITAL | Age: 67
End: 2022-06-19

## 2022-06-19 VITALS
BODY MASS INDEX: 29.19 KG/M2 | TEMPERATURE: 97.4 F | RESPIRATION RATE: 18 BRPM | DIASTOLIC BLOOD PRESSURE: 66 MMHG | SYSTOLIC BLOOD PRESSURE: 137 MMHG | HEART RATE: 68 BPM | WEIGHT: 186 LBS | OXYGEN SATURATION: 91 % | HEIGHT: 67 IN

## 2022-06-19 LAB
ANION GAP SERPL CALCULATED.3IONS-SCNC: 8.1 MMOL/L (ref 5–15)
BASOPHILS # BLD AUTO: 0.03 10*3/MM3 (ref 0–0.2)
BASOPHILS NFR BLD AUTO: 0.2 % (ref 0–1.5)
BUN SERPL-MCNC: 13 MG/DL (ref 8–23)
BUN/CREAT SERPL: 18.3 (ref 7–25)
CALCIUM SPEC-SCNC: 8.7 MG/DL (ref 8.6–10.5)
CHLORIDE SERPL-SCNC: 104 MMOL/L (ref 98–107)
CO2 SERPL-SCNC: 26.9 MMOL/L (ref 22–29)
CREAT SERPL-MCNC: 0.71 MG/DL (ref 0.76–1.27)
DEPRECATED RDW RBC AUTO: 43.6 FL (ref 37–54)
EGFRCR SERPLBLD CKD-EPI 2021: 100.6 ML/MIN/1.73
EOSINOPHIL # BLD AUTO: 0.18 10*3/MM3 (ref 0–0.4)
EOSINOPHIL NFR BLD AUTO: 1.4 % (ref 0.3–6.2)
ERYTHROCYTE [DISTWIDTH] IN BLOOD BY AUTOMATED COUNT: 13.4 % (ref 12.3–15.4)
GLUCOSE SERPL-MCNC: 101 MG/DL (ref 65–99)
HCT VFR BLD AUTO: 37.3 % (ref 37.5–51)
HGB BLD-MCNC: 13 G/DL (ref 13–17.7)
LYMPHOCYTES # BLD AUTO: 2.27 10*3/MM3 (ref 0.7–3.1)
LYMPHOCYTES NFR BLD AUTO: 18.2 % (ref 19.6–45.3)
MCH RBC QN AUTO: 31 PG (ref 26.6–33)
MCHC RBC AUTO-ENTMCNC: 34.9 G/DL (ref 31.5–35.7)
MCV RBC AUTO: 89 FL (ref 79–97)
MONOCYTES # BLD AUTO: 1.19 10*3/MM3 (ref 0.1–0.9)
MONOCYTES NFR BLD AUTO: 9.6 % (ref 5–12)
NEUTROPHILS NFR BLD AUTO: 70.2 % (ref 42.7–76)
NEUTROPHILS NFR BLD AUTO: 8.72 10*3/MM3 (ref 1.7–7)
PLATELET # BLD AUTO: 129 10*3/MM3 (ref 140–450)
PMV BLD AUTO: 11.6 FL (ref 6–12)
POTASSIUM SERPL-SCNC: 4 MMOL/L (ref 3.5–5.2)
RBC # BLD AUTO: 4.19 10*6/MM3 (ref 4.14–5.8)
SODIUM SERPL-SCNC: 139 MMOL/L (ref 136–145)
WBC NRBC COR # BLD: 12.44 10*3/MM3 (ref 3.4–10.8)

## 2022-06-19 PROCEDURE — 85025 COMPLETE CBC W/AUTO DIFF WBC: CPT | Performed by: SURGERY

## 2022-06-19 PROCEDURE — 80048 BASIC METABOLIC PNL TOTAL CA: CPT | Performed by: SURGERY

## 2022-06-19 RX ADMIN — ATORVASTATIN CALCIUM 80 MG: 80 TABLET, FILM COATED ORAL at 08:35

## 2022-06-19 RX ADMIN — DILTIAZEM HYDROCHLORIDE 240 MG: 240 CAPSULE, COATED, EXTENDED RELEASE ORAL at 08:35

## 2022-06-19 RX ADMIN — METOPROLOL SUCCINATE 100 MG: 100 TABLET, EXTENDED RELEASE ORAL at 08:35

## 2022-06-19 RX ADMIN — APIXABAN 5 MG: 5 TABLET, FILM COATED ORAL at 08:35

## 2022-06-19 RX ADMIN — PANTOPRAZOLE SODIUM 40 MG: 40 TABLET, DELAYED RELEASE ORAL at 07:26

## 2022-06-19 RX ADMIN — Medication 10 ML: at 08:35

## 2022-06-19 RX ADMIN — LISINOPRIL: 20 TABLET ORAL at 08:35

## 2022-06-19 NOTE — DISCHARGE SUMMARY
Patient Name: Yehuda Weaver  : 1955  MRN: [id number]    Date of Admission: 6/15/2022  Date of Discharge:  2022  Primary Care Physician: Koby Hernandez MD      Chief Complaint:   Leg Swelling      Discharge Diagnoses     Active Hospital Problems    Diagnosis  POA   • **Atherosclerosis of native arteries of extremities with intermittent claudication, bilateral legs ([facility]) [I70.213]  Yes   • Tobacco dependence due to cigarettes [F17.210]  Yes   • Arterial stent thrombosis, initial encounter ([facility]) [T82.868A]  Yes   • Coronary artery disease [I25.10]  Yes   • Hypertension [I10]  Yes   • Hyperlipidemia [E78.5]  Yes   • Chronic obstructive pulmonary disease ([facility]) [J44.9]  Yes      Resolved Hospital Problems   No resolved problems to display.        Hospital Course   This is a 67-year-old male with a past medical history of coronary artery disease status post CABG, hypertension, hyperlipidemia, bladder cancer, as well as peripheral artery disease status post stenting.  He came to the hospital with acute onset right lower leg pain, numbness, and tingling that started earlier in the day.  He had a stent placed in the right superficial femoral artery approximately 3 to 4 months ago, and was on DAPT for about 1 month at which time Plavix was discontinued.  He was instructed to remain on aspirin 81 daily.  A CT angiogram demonstrated occlusion of the right superficial femoral artery stent.  He was ordered on heparin drip and will go for potential thrombolysis tomorrow by vascular surgery.     Also noted on the CT scan were bilateral renal cysts, stable left adrenal angiolipoma, enlarged prostate.        Right superficial femoral artery stent thrombosis  Peripheral arterial disease  Appreciate Vascular assistance  S/P RLE angiogram, mechanical thrombectomy, angiogram of proximal SFA, placement of EKOS catheter on .  On  patient underwent removal of the EKOS catheter, right femoral-popliteal angioplasty  to 5 mm, percutaneous aspiration thrombectomy of the posttibial artery, tibioperoneal trunk and popliteal artery.  Heparin drip has been discontinued and is on Eliquis and statins which will be continued upon discharge and vascular surgery did clear him.  We will have him follow-up with vascular team as an outpatient basis.  Patient does not have any leg pain and is ambulatory.     Coronary artery disease  Status post CABG  Resume aspirin upon discharge and will continue with Eliquis and statins.     Hypertension  Blood pressure is reasonably well controlled and is on Cardizem, lisinopril/HCTZ along with metoprolol.     Hyperlipidemia   statin      Tobacco use disorder  Current ppd smoker, has ~50 pack year history  Declines nicotine patch and counseled him to quit smoking.       Day of Discharge         Physical Exam:  Temp:  [97.4 °F (36.3 °C)-98 °F (36.7 °C)] 97.4 °F (36.3 °C)  Heart Rate:  [59-68] 68  Resp:  [16-18] 18  BP: (120-137)/(65-74) 137/66  Body mass index is 29.13 kg/m².  Physical Exam  Constitutional:       General: He is not in acute distress.     Appearance: Appears well, does not appear to be toxic  HENT:      Head: Normocephalic.      Mouth/Throat:      Mouth: Mucous membranes are moist.   Eyes:      Conjunctiva/sclera: Conjunctivae normal.   Cardiovascular:      Rate and Rhythm: Normal rate and regular rhythm.   Pulmonary:      Effort: Pulmonary effort is normal. No respiratory distress.      Breath sounds: No wheezing or rales.   Abdominal:      General: Bowel sounds are normal.      Palpations: Abdomen is soft.   Musculoskeletal:      Cervical back: Normal range of motion and neck supple.      Right lower leg: No edema.      Left lower leg: No edema.   Skin:     General: Skin is warm and dry.   Neurological:      General: No focal deficit present.      Mental Status: He is alert and oriented to person, place, and time.   Psychiatric:         Mood and Affect: Mood normal.         Behavior:  Behavior normal.       Consultants     Consult Orders (all) (From admission, onward)     Start     Ordered    06/15/22 2015  [facility] (on-call MD unless specified) Details  Once        Specialty:  Hospitalist  Provider:  (Not yet assigned)    06/15/22 2014    06/15/22 1919  Inpatient Vascular Surgery Consult  Once        Specialty:  Vascular Surgery  Provider:  (Not yet assigned)    06/15/22 1918              Procedures     EKOS CATHETER REMOVAL, RIGHT LOWER EXTREMITY ARTERIOGRAM WITH PERCUTANEOUS TRANSLUMINAL ANGIOPLASTY, ASPIRATION PNEUMBRA THROMBECTOMY, AND IVUS      Imaging Results (All)     Procedure Component Value Units Date/Time    Arteriogram (Autofinalize) [[id number]] Resulted: 06/17/22 1609     Updated: 06/17/22 1609    Narrative:      This procedure was auto-finalized with no dictation required.    Arteriogram (Autofinalize) [[id number]] Resulted: 06/16/22 1350     Updated: 06/16/22 1350    Narrative:      This procedure was auto-finalized with no dictation required.    CT Angio Abdominal Aorta Bilateral Iliofem Runoff [[id number]] Collected: 06/15/22 1857     Updated: 06/15/22 1916    Narrative:      CT ANGIO ABDOMINAL AORTA BILAT ILIOFEM RUNOFF-     INDICATIONS: Right lower extremity pain, possible occluded stent.   Radiation dose reduction techniques were utilized, including automated  exposure control and exposure modulation based on body size.     TECHNIQUE: CT angiography of the abdominal aorta with bilateral lower  extremity runoff. NASCET criteria. 3-dimensional reconstructions.     COMPARISON: 02/28/2022     FINDINGS:     Vascular:     No abdominal aortic aneurysm or dissection. Scattered plaque is seen in  the abdominal aorta without high-grade stenosis (0-49% stenosis.     Patent celiac artery, SMA, bilateral renal arteries, LEONARDO.     Scattered plaque is seen in the bilateral iliac arteries without  high-grade stenosis (0-49% stenosis).     Right lower extremity runoff:     The proximal right  superficial femoral artery is occluded, and bypass  stent is also occluded. Opacification of the popliteal artery is seen by  collateral flow, and there appears to be opacification of the right  anterior and posterior tibial arteries and peroneal artery that becomes  increasingly thready at the distal aspect of the right lower leg.     Left lower extremity runoff: Plaque is seen in the left common femoral  artery without high-grade stenosis (0-49% stenosis. The left deep  femoral artery is patent. The left superficial femoral artery bypass  stent appears patent. Calcification in the left popliteal artery appears  to result in as much as about 50% stenosis. Scattered calcifications are  seen in the arteries at the lower leg with prominent stenosis suggested  at the origin of the left anterior tibial artery, and at the proximal to  mid aspect of the left posterior tibial artery.           Abdomen pelvis:     Bilateral renal cysts are seen.     Stable appearing left adrenal angiomyolipoma is apparent.     Otherwise unremarkable appearance of the liver, spleen, adrenal glands,  pancreas, kidneys, bladder. The prostate is enlarged, 5.3 cm, recommend  PSA correlation as indicated (appearance is not significantly changed).     No bowel obstruction or abnormal bowel thickening is identified. Colonic  diverticula are seen that do not appear inflamed.     No free intraperitoneal gas or free fluid.     Scattered small mesenteric and para-aortic lymph nodes are seen that are  not significant by size criteria.     The lung bases are clear.     Degenerative changes are seen in the spine. No acute fracture is  identified.             Impression:         1. Occlusion of the right superficial femoral artery stent, with thready  opacification of the arteries of the right lower leg by collateral flow.  Areas of arterial narrowing, as detailed above.  2. Incidental findings as noted.     Discussed by telephone with Juancho Cruz at  time of interpretation,  1910, 06/15/2022.     This report was finalized on 6/15/2022 7:13 PM by Dr. Vikas Appiah M.D.               Pertinent Labs     Results from last 7 days   Lab Units 06/19/22  0513 06/18/22  0602 06/17/22  1204 06/17/22  0802   WBC 10*3/mm3 12.44* 12.33* 11.07* 11.16*   HEMOGLOBIN g/dL 13.0 13.4 13.8 13.8   PLATELETS 10*3/mm3 129* 125* 120* 117*     Results from last 7 days   Lab Units 06/19/22  0513 06/18/22  0602 06/17/22  0416 06/16/22  1558   SODIUM mmol/L 139 138 138 139   POTASSIUM mmol/L 4.0 4.0 3.8 3.7   CHLORIDE mmol/L 104 102 104 105   CO2 mmol/L 26.9 25.9 27.0 26.0   BUN mg/dL 13 11 11 10   CREATININE mg/dL 0.71* 0.68* 0.67* 0.72*   GLUCOSE mg/dL 101* 132* 119* 99   EGFR mL/min/1.73 100.6 101.9 102.3 100.1     Results from last 7 days   Lab Units 06/15/22  1630   ALBUMIN g/dL 4.30   BILIRUBIN mg/dL 0.3   ALK PHOS U/L 87   AST (SGOT) U/L 25   ALT (SGPT) U/L 38     Results from last 7 days   Lab Units 06/19/22  0513 06/18/22  0602 06/17/22  0416 06/16/22  1558 06/16/22  0429 06/15/22  1630   CALCIUM mg/dL 8.7 8.0* 7.9* 8.2*   < > 9.1   ALBUMIN g/dL  --   --   --   --   --  4.30    < > = values in this interval not displayed.       Results from last 7 days   Lab Units 06/16/22  0429   CK TOTAL U/L 95           Invalid input(s): LDLCALC      Results from last 7 days   Lab Units 06/15/22  2046   COVID19  Not Detected       Test Results Pending at Discharge       Discharge Details        Discharge Medications      New Medications      Instructions Start Date   apixaban 5 MG tablet tablet  Commonly known as: ELIQUIS   5 mg, Oral, Every 12 Hours Scheduled         Continue These Medications      Instructions Start Date   aspirin 81 MG chewable tablet   81 mg, Oral, Daily      atorvastatin 80 MG tablet  Commonly known as: LIPITOR   Daily      dilTIAZem 240 MG 24 hr capsule  Commonly known as: TIAZAC   Daily      doxazosin 4 MG tablet  Commonly known as: CARDURA   No dose, route, or  frequency recorded.      fish oil 1000 MG capsule capsule   No dose, route, or frequency recorded.      fluticasone 50 MCG/ACT nasal spray  Commonly known as: FLONASE   No dose, route, or frequency recorded.      lisinopril-hydrochlorothiazide 20-25 MG per tablet  Commonly known as: PRINZIDE,ZESTORETIC   No dose, route, or frequency recorded.      metoprolol succinate  MG 24 hr tablet  Commonly known as: TOPROL-XL   Daily      montelukast 10 MG tablet  Commonly known as: SINGULAIR   Daily      multivitamin tablet tablet   No dose, route, or frequency recorded.      omeprazole 40 MG capsule  Commonly known as: priLOSEC   omeprazole 40 mg oral capsule,delayed release(DR/EC) take 1 capsule (40 mg) by oral route once daily before a meal in combination with clarithromycin   Active             Allergies   Allergen Reactions   • Penicillins Unknown - Low Severity       Discharge Disposition:  Home or Self Care      Discharge Diet:  Diet Order   Procedures   • Diet Regular; Cardiac       Discharge Activity:   Activity Instructions     Activity as Tolerated            CODE STATUS:    Code Status and Medical Interventions:   Ordered at: 06/16/22 1521     Level Of Support Discussed With:    Patient     Code Status (Patient has no pulse and is not breathing):    CPR (Attempt to Resuscitate)     Medical Interventions (Patient has pulse or is breathing):    Full Support       Future Appointments   Date Time Provider Department Center   8/12/2022 10:15 AM Rodolfo Nicole MD [facility] [place] [facility]     Additional Instructions for the Follow-ups that You Need to Schedule     Discharge Follow-up with PCP   As directed       Currently Documented PCP:    Koby Hernandez MD    PCP Phone Number:    [phone]     Follow Up Details: 2 weeks         Discharge Follow-up with Specified Provider: ; 1 Week   As directed      To:     Follow Up: 1 Week            Follow-up Information     Koby Hernandez MD.  Schedule an appointment as soon as possible for a visit in 2 week(s).    Specialty: Family Medicine  Why: 2 weeks, Hospital admission follow up  Contact information:  [address] [place] [address] [phone]             Yehuda Salgado MD. Schedule an appointment as soon as possible for a visit in 1 week(s).    Specialty: Vascular Surgery  Why: Hospital admission follow up  Contact information:  [address] [facility] [address]  [facility] [place] [address] [phone]                         Additional Instructions for the Follow-ups that You Need to Schedule     Discharge Follow-up with PCP   As directed       Currently Documented PCP:    Koby Hernandez MD    PCP Phone Number:    [phone]     Follow Up Details: 2 weeks         Discharge Follow-up with Specified Provider: ; 1 Week   As directed      To:     Follow Up: 1 Week           Time Spent on Discharge:  Greater than 30 minutes      Sage Smith MD  [facility]  06/19/22  16:00 EDT

## 2022-06-19 NOTE — PLAN OF CARE
Vitals stable, on room air. Pt with no c/o pain during shift. Pt to d/c home today with family to transport.

## 2022-06-19 NOTE — PLAN OF CARE
Problem: Adult Inpatient Plan of Care  Goal: Plan of Care Review  Flowsheets  Taken 6/19/2022 0438 by Denice Alberto RN  Plan of Care Reviewed With: patient  Outcome Evaluation: pedal pulses present with doppler left groin puncture site dry and intact denies pain states is emptying bladder well no complaints ready to go home  Taken 6/18/2022 1626 by Randee Bonds RN  Progress: improving   Goal Outcome Evaluation:  Plan of Care Reviewed With: patient           Outcome Evaluation: pedal pulses present with doppler left groin puncture site dry and intact denies pain states is emptying bladder well no complaints ready to go home

## 2022-06-20 NOTE — OUTREACH NOTE
Prep Survey    Flowsheet Row Responses   Denominational facility patient discharged from? [facility]   Is LACE score < 7 ? No   Emergency Room discharge w/ pulse ox? No   Eligibility Readm Mgmt   Discharge diagnosis Right superficial femoral artery stent thrombosis   Does the patient have one of the following disease processes/diagnoses(primary or secondary)? Other   Does the patient have Home health ordered? No   Is there a DME ordered? No   Prep survey completed? Yes          STEPHANIE CHAMBERS - Registered Nurse

## 2022-06-20 NOTE — CASE MANAGEMENT/SOCIAL WORK
Case Management Discharge Note      Final Note: Pt discharged home, no known needs.  ADAN Quintero RN         Selected Continued Care - Discharged on 6/19/2022 Admission date: 6/15/2022 - Discharge disposition: Home or Self Care    Destination    No services have been selected for the patient.              Durable Medical Equipment    No services have been selected for the patient.              Dialysis/Infusion    No services have been selected for the patient.              Home Medical Care    No services have been selected for the patient.              Therapy    No services have been selected for the patient.              Community Resources    No services have been selected for the patient.              Community & DME    No services have been selected for the patient.                  Transportation Services  Private: Car    Final Discharge Disposition Code: 01 - home or self-care

## 2022-06-21 ENCOUNTER — READMISSION MANAGEMENT (OUTPATIENT)
Dept: CALL CENTER | Facility: HOSPITAL | Age: 67
End: 2022-06-21

## 2022-06-21 NOTE — OUTREACH NOTE
Medical Week 1 Survey    Flowsheet Row Responses   [facility] patient discharged from? [place]   Does the patient have one of the following disease processes/diagnoses(primary or secondary)? Other   Week 1 attempt successful? Yes   Call start time 0913   Call end time 0915   Discharge diagnosis Right superficial femoral artery stent thrombosis   Person spoke with today (if not patient) and relationship Nikki-SO   Meds reviewed with patient/caregiver? Yes   Is the patient having any side effects they believe may be caused by any medication additions or changes? No   Does the patient have all medications ordered at discharge? Yes   Is the patient taking all medications as directed (includes completed medication regime)? Yes   Comments regarding appointments Appt with Dr. Salgado has been scheduled   Does the patient have a primary care provider?  Yes   Does the patient have an appointment with their PCP within 7 days of discharge? Yes   Comments regarding PCP 6/20/22   Has the patient kept scheduled appointments due by today? Yes   Psychosocial issues? No   Did the patient receive a copy of their discharge instructions? Yes   Nursing interventions Reviewed instructions with patient   What is the patient's perception of their health status since discharge? Improving   Is the patient/caregiver able to teach back signs and symptoms related to disease process for when to call PCP? Yes   Is the patient/caregiver able to teach back signs and symptoms related to disease process for when to call 911? Yes   Is the patient/caregiver able to teach back the hierarchy of who to call/visit for symptoms/problems? PCP, Specialist, Home health nurse, Urgent Care, ED, 911 Yes   If the patient is a current smoker, are they able to teach back resources for cessation? [id number]  [Not smoked since discharge on 6/19/22]   Week 1 call completed? Yes          OLAYINKA GALVAN - Registered Nurse

## 2022-08-11 NOTE — PROGRESS NOTES
[facility]  Cardiology progress Note    Patient Name: Yehuda Weaver  : 1955    CHIEF COMPLAINT  CORONARY ARTERY DISEASE        Subjective   Subjective     HISTORY OF PRESENT ILLNESS    Yehuda Weaver is a 67 y.o. male with coronary artery s/p CABG.  No chest pain or shortness of breath.    REVIEW OF SYSTEMS    Constitutional:    No fever, no weight loss  Skin:     No rash  Otolaryngeal:    No difficulty swallowing  Cardiovascular:  No chest pain or shortness of breath  Pulmonary:    No cough, no sputum production    Personal History     Social History:    reports that he has been smoking. He has been smoking about 1.00 pack per day. He has never used smokeless tobacco. He reports previous alcohol use. He reports that he does not use drugs.    Home Medications:  Current Outpatient Medications on File Prior to Visit   Medication Sig   • aspirin 81 MG chewable tablet Chew 81 mg Daily.   • atorvastatin (LIPITOR) 80 MG tablet Daily.   • dilTIAZem (TIAZAC) 240 MG 24 hr capsule Daily.   • doxazosin (CARDURA) 4 MG tablet    • Eliquis 5 MG tablet tablet Take 5 mg by mouth 2 (Two) Times a Day.   • fluticasone (FLONASE) 50 MCG/ACT nasal spray    • lisinopril-hydrochlorothiazide (PRINZIDE,ZESTORETIC) 20-25 MG per tablet    • metoprolol succinate XL (TOPROL-XL) 100 MG 24 hr tablet Daily.   • montelukast (SINGULAIR) 10 MG tablet Daily.   • multivitamin (THERAGRAN) tablet tablet    • Omega-3 Fatty Acids (fish oil) 1000 MG capsule capsule    • omeprazole (priLOSEC) 40 MG capsule omeprazole 40 mg oral capsule,delayed release(DR/EC) take 1 capsule (40 mg) by oral route once daily before a meal in combination with clarithromycin   Active     No current facility-administered medications on file prior to visit.       Past Medical History:   Diagnosis Date   • Bladder cancer (HCC)    • Coronary artery disease    • Diverticulitis    • Heart attack (HCC)    • Hyperlipidemia    • Hypertension         Allergies:  Allergies   Allergen Reactions   • Penicillins Unknown - Low Severity       Objective    Objective       Vitals:   Heart Rate:  [74] 74  BP: (122)/(66) 122/66  Body mass index is 30.23 kg/m².     PHYSICAL EXAM:    General Appearance:   · well developed  · well nourished  HENT:   · oropharynx moist  · lips not cyanotic  Neck:  · thyroid not enlarged  · supple  Respiratory:  · no respiratory distress  · normal breath sounds  · no rales  Cardiovascular:  · no jugular venous distention  · regular rhythm  · apical impulse normal  · S1 normal, S2 normal  · no S3, no S4   · no murmur  · no rub, no thrill  · carotid pulses normal; no bruit  · pedal pulses normal  · lower extremity edema: none    Skin:   · warm, dry  Psychiatric:  · judgement and insight appropriate  · normal mood and affect        Result Review:  I have personally reviewed the available results from  [x]  Laboratory  [x]  EKG  [x]  Cardiology  [x]  Medications  [x]  Old records  []  Other:     Procedures    Impression/Plan:  1.  Coronary s/p CABG stable: Continue aspirin 81 mg a day.  Continue Toprol- mg a day.  Note of the chest pain.  2.  Hyperlipidemia: Continue Lipitor 80 mg a day.  Check lipid and hepatic profile.  3.  Peripheral vascular disease: Continue aspirin 81 mg a day.  4.  Positive nicotine use: Smoking cessation discussed with patient.  5.  Peripheral vascular disease s/p PTCA/stent: Continue aspirin 81 mg a day.  6.  DVT: Continue Eliquis 5 mg twice daily.        Rodolfo Nicole MD   08/12/22   10:47 EDT

## 2022-08-12 ENCOUNTER — OFFICE VISIT (OUTPATIENT)
Dept: CARDIOLOGY | Facility: CLINIC | Age: 67
End: 2022-08-12

## 2022-08-12 VITALS
BODY MASS INDEX: 30.29 KG/M2 | SYSTOLIC BLOOD PRESSURE: 122 MMHG | HEIGHT: 67 IN | WEIGHT: 193 LBS | DIASTOLIC BLOOD PRESSURE: 66 MMHG | HEART RATE: 74 BPM

## 2022-08-12 DIAGNOSIS — I25.10 CORONARY ARTERY DISEASE INVOLVING NATIVE CORONARY ARTERY OF NATIVE HEART WITHOUT ANGINA PECTORIS: ICD-10-CM

## 2022-08-12 DIAGNOSIS — Z95.1 HX OF CABG: ICD-10-CM

## 2022-08-12 DIAGNOSIS — E78.2 HYPERLIPEMIA, MIXED: ICD-10-CM

## 2022-08-12 DIAGNOSIS — Z72.0 NICOTINE USE: Primary | ICD-10-CM

## 2022-08-12 PROCEDURE — 99214 OFFICE O/P EST MOD 30 MIN: CPT | Performed by: SPECIALIST

## 2022-08-12 RX ORDER — APIXABAN 5 MG/1
5 TABLET, FILM COATED ORAL 2 TIMES DAILY
COMMUNITY
Start: 2022-08-08

## 2022-08-12 NOTE — PATIENT INSTRUCTIONS
Managing the Challenge of Quitting Smoking  Quitting smoking is a physical and mental challenge. You will face cravings, withdrawal symptoms, and temptation. Before quitting, work with your health care provider to make a plan that can help you manage quitting. Preparation can help you quit and keep you from giving in.  How to manage lifestyle changes  Managing stress  Stress can make you want to smoke, and wanting to smoke may cause stress. It is important to find ways to manage your stress. You might try some of the following:  Practice relaxation techniques.  Breathe slowly and deeply, in through your nose and out through your mouth.  Listen to music.  Soak in a bath or take a shower.  Imagine a peaceful place or vacation.  Get some support.  Talk with family or friends about your stress.  Join a support group.  Talk with a counselor or therapist.  Get some physical activity.  Go for a walk, run, or bike ride.  Play a favorite sport.  Practice yoga.    Medicines  Talk with your health care provider about medicines that might help you deal with cravings and make quitting easier for you.  Relationships  Social situations can be difficult when you are quitting smoking. To manage this, you can:  Avoid parties and other social situations where people might be smoking.  Avoid alcohol.  Leave right away if you have the urge to smoke.  Explain to your family and friends that you are quitting smoking. Ask for support and let them know you might be a bit grumpy.  Plan activities where smoking is not an option.  General instructions  Be aware that many people gain weight after they quit smoking. However, not everyone does. To keep from gaining weight, have a plan in place before you quit and stick to the plan after you quit. Your plan should include:  Having healthy snacks. When you have a craving, it may help to:  Eat popcorn, carrots, celery, or other cut vegetables.  Chew sugar-free gum.  Changing how you eat.  Eat small  portion sizes at meals.  Eat 4-6 small meals throughout the day instead of 1-2 large meals a day.  Be mindful when you eat. Do not watch television or do other things that might distract you as you eat.  Exercising regularly.  Make time to exercise each day. If you do not have time for a long workout, do short bouts of exercise for 5-10 minutes several times a day.  Do some form of strengthening exercise, such as weight lifting.  Do some exercise that gets your heart beating and causes you to breathe deeply, such as walking fast, running, swimming, or biking. This is very important.  Drinking plenty of water or other low-calorie or no-calorie drinks. Drink 6-8 glasses of water daily.    How to recognize withdrawal symptoms  Your body and mind may experience discomfort as you try to get used to not having nicotine in your system. These effects are called withdrawal symptoms. They may include:  Feeling hungrier than normal.  Having trouble concentrating.  Feeling irritable or restless.  Having trouble sleeping.  Feeling depressed.  Craving a cigarette.  To manage withdrawal symptoms:  Avoid places, people, and activities that trigger your cravings.  Remember why you want to quit.  Get plenty of sleep.  Avoid coffee and other caffeinated drinks. These may worsen some of your symptoms.  These symptoms may surprise you. But be assured that they are normal to have when quitting smoking.  How to manage cravings  Come up with a plan for how to deal with your cravings. The plan should include the following:  A definition of the specific situation you want to deal with.  An alternative action you will take.  A clear idea for how this action will help.  The name of someone who might help you with this.  Cravings usually last for 5-10 minutes. Consider taking the following actions to help you with your plan to deal with cravings:  Keep your mouth busy.  Chew sugar-free gum.  Suck on hard candies or a straw.  Brush your  teeth.  Keep your hands and body busy.  Change to a different activity right away.  Squeeze or play with a ball.  Do an activity or a hobby, such as making bead jewelry, practicing needlepoint, or working with wood.  Mix up your normal routine.  Take a short exercise break. Go for a quick walk or run up and down stairs.  Focus on doing something kind or helpful for someone else.  Call a friend or family member to talk during a craving.  Join a support group.  Contact a quitline.  Where to find support  To get help or find a support group:  Call the National Cancer [place]'s Smoking Quitline: 2-421-QUIT NOW (379-6594)  Visit the website of the Substance Abuse and Mental Health Services Administration: www.samhsa.gov  Text QUIT to SmokefreeTXT: 539000  Where to find more information  Visit these websites to find more information on quitting smoking:  [employer] [place]: www.smokefree.gov  American Lung Association: www.lung.org  American Cancer Society: www.cancer.org  Centers for Disease Control and Prevention: www.cdc.gov  American Heart Association: www.heart.org  Contact a health care provider if:  You want to change your plan for quitting.  The medicines you are taking are not helping.  Your eating feels out of control or you cannot sleep.  Get help right away if:  You feel depressed or become very anxious.  Summary  Quitting smoking is a physical and mental challenge. You will face cravings, withdrawal symptoms, and temptation to smoke again. Preparation can help you as you go through these challenges.  Try different techniques to manage stress, handle social situations, and prevent weight gain.  You can deal with cravings by keeping your mouth busy (such as by chewing gum), keeping your hands and body busy, calling family or friends, or contacting a quitline for people who want to quit smoking.  You can deal with withdrawal symptoms by avoiding places where people smoke, getting plenty of rest, and  avoiding drinks with caffeine.  This information is not intended to replace advice given to you by your health care provider. Make sure you discuss any questions you have with your health care provider.  Document Revised: 10/06/2020 Document Reviewed: 10/06/2020  Elsevier Patient Education © 2021 [employer]

## 2022-09-26 ENCOUNTER — TRANSCRIBE ORDERS (OUTPATIENT)
Dept: ADMINISTRATIVE | Facility: HOSPITAL | Age: 67
End: 2022-09-26

## 2022-09-26 DIAGNOSIS — F17.210 CIGARETTE SMOKER: Primary | ICD-10-CM

## 2022-10-03 ENCOUNTER — HOSPITAL ENCOUNTER (OUTPATIENT)
Dept: CT IMAGING | Facility: HOSPITAL | Age: 67
Discharge: HOME | End: 2022-10-03
Admitting: NURSE PRACTITIONER

## 2022-10-03 DIAGNOSIS — F17.210 CIGARETTE SMOKER: ICD-10-CM

## 2022-10-03 PROCEDURE — 71271 CT THORAX LUNG CANCER SCR C-: CPT

## 2022-10-18 ENCOUNTER — PRE-ADMISSION TESTING (OUTPATIENT)
Dept: PREADMISSION TESTING | Facility: HOSPITAL | Age: 67
End: 2022-10-18

## 2022-10-18 VITALS
HEART RATE: 75 BPM | TEMPERATURE: 97.1 F | WEIGHT: 186 LBS | DIASTOLIC BLOOD PRESSURE: 85 MMHG | SYSTOLIC BLOOD PRESSURE: 149 MMHG | HEIGHT: 67 IN | BODY MASS INDEX: 29.19 KG/M2 | RESPIRATION RATE: 18 BRPM | OXYGEN SATURATION: 98 %

## 2022-10-18 LAB
ANION GAP SERPL CALCULATED.3IONS-SCNC: 10 MMOL/L (ref 5–15)
BUN SERPL-MCNC: 14 MG/DL (ref 8–23)
BUN/CREAT SERPL: 17.7 (ref 7–25)
CALCIUM SPEC-SCNC: 8.9 MG/DL (ref 8.6–10.5)
CHLORIDE SERPL-SCNC: 102 MMOL/L (ref 98–107)
CO2 SERPL-SCNC: 26 MMOL/L (ref 22–29)
CREAT SERPL-MCNC: 0.79 MG/DL (ref 0.76–1.27)
DEPRECATED RDW RBC AUTO: 45.5 FL (ref 37–54)
EGFRCR SERPLBLD CKD-EPI 2021: 97.4 ML/MIN/1.73
ERYTHROCYTE [DISTWIDTH] IN BLOOD BY AUTOMATED COUNT: 13.9 % (ref 12.3–15.4)
GLUCOSE SERPL-MCNC: 110 MG/DL (ref 65–99)
HCT VFR BLD AUTO: 45.8 % (ref 37.5–51)
HGB BLD-MCNC: 14.9 G/DL (ref 13–17.7)
INR PPP: 0.92 (ref 0.9–1.1)
MCH RBC QN AUTO: 29.3 PG (ref 26.6–33)
MCHC RBC AUTO-ENTMCNC: 32.5 G/DL (ref 31.5–35.7)
MCV RBC AUTO: 90.2 FL (ref 79–97)
PLATELET # BLD AUTO: 172 10*3/MM3 (ref 140–450)
PMV BLD AUTO: 11.3 FL (ref 6–12)
POTASSIUM SERPL-SCNC: 4 MMOL/L (ref 3.5–5.2)
PROTHROMBIN TIME: 12.5 SECONDS (ref 11.7–14.2)
QT INTERVAL: 433 MS
RBC # BLD AUTO: 5.08 10*6/MM3 (ref 4.14–5.8)
SODIUM SERPL-SCNC: 138 MMOL/L (ref 136–145)
WBC NRBC COR # BLD: 8.36 10*3/MM3 (ref 3.4–10.8)

## 2022-10-18 PROCEDURE — 80048 BASIC METABOLIC PNL TOTAL CA: CPT

## 2022-10-18 PROCEDURE — 36415 COLL VENOUS BLD VENIPUNCTURE: CPT

## 2022-10-18 PROCEDURE — 85027 COMPLETE CBC AUTOMATED: CPT

## 2022-10-18 PROCEDURE — 93005 ELECTROCARDIOGRAM TRACING: CPT

## 2022-10-18 PROCEDURE — 93010 ELECTROCARDIOGRAM REPORT: CPT | Performed by: INTERNAL MEDICINE

## 2022-10-18 PROCEDURE — 85610 PROTHROMBIN TIME: CPT

## 2022-10-18 RX ORDER — ALBUTEROL SULFATE 2.5 MG/3ML
2.5 SOLUTION RESPIRATORY (INHALATION) EVERY 4 HOURS PRN
COMMUNITY

## 2022-10-18 RX ORDER — CHLORHEXIDINE GLUCONATE 500 MG/1
CLOTH TOPICAL
COMMUNITY
End: 2022-10-20 | Stop reason: HOSPADM

## 2022-10-18 NOTE — DISCHARGE INSTRUCTIONS
Take the following medications the morning of surgery: METOPROLOL AND DILTIAZEM      USE ALBUTEROL AND BRING INHALER WITH YOU    CALL DR OLIVA'S OFFICE TO SEE WHEN YOU SHOULD HOLD ELIQUIS AND ASPIRIN    ARRIVAL TIME  IS 1030 ON 10/20/2022      If you are on prescription narcotic pain medication to control your pain you may also take that medication the morning of surgery.    General Instructions:  Do not eat solid food after midnight the night before surgery.  You may drink clear liquids day of surgery but must stop at least one hour before your hospital arrival time.  It is beneficial for you to have a clear drink that contains carbohydrates the day of surgery.  We suggest a 12 to 20 ounce bottle of Gatorade or Powerade for non-diabetic patients or a 12 to 20 ounce bottle of G2 or Powerade Zero for diabetic patients. (Pediatric patients, are not advised to drink a 12 to 20 ounce carbohydrate drink)    Clear liquids are liquids you can see through.  Nothing red in color.     Plain water                               Sports drinks  Sodas                                   Gelatin (Jell-O)  Fruit juices without pulp such as white grape juice and apple juice  Popsicles that contain no fruit or yogurt  Tea or coffee (no cream or milk added)  Gatorade / Powerade  G2 / Powerade Zero    Patients who avoid smoking, chewing tobacco and alcohol for 4 weeks prior to surgery have a reduced risk of post-operative complications.  Quit smoking as many days before surgery as you can.  Do not smoke, use chewing tobacco or drink alcohol the day of surgery.   If applicable bring your C-PAP/ BI-PAP machine.  Bring any papers given to you in the doctor’s office.  Wear clean comfortable clothes.  Do not wear contact lenses, false eyelashes or make-up.  Bring a case for your glasses.   Bring crutches or walker if applicable.  Remove all piercings.  Leave jewelry and any other valuables at home.  Hair extensions with metal clips must be  removed prior to surgery.  The Pre-Admission Testing nurse will instruct you to bring medications if unable to obtain an accurate list in Pre-Admission Testing.          Preventing a Surgical Site Infection:  For 2 to 3 days before surgery, avoid shaving with a razor because the razor can irritate skin and make it easier to develop an infection.    Any areas of open skin can increase the risk of a post-operative wound infection by allowing bacteria to enter and travel throughout the body.  Notify your surgeon if you have any skin wounds / rashes even if it is not near the expected surgical site.  The area will need assessed to determine if surgery should be delayed until it is healed.  The night prior to surgery shower using a fresh bar of anti-bacterial soap (such as Dial) and clean washcloth.  Sleep in a clean bed with clean clothing.  Do not allow pets to sleep with you.  Shower on the morning of surgery using a fresh bar of anti-bacterial soap (such as Dial) and clean washcloth.  Dry with a clean towel and dress in clean clothing.  Ask your surgeon if you will be receiving antibiotics prior to surgery.  Make sure you, your family, and all healthcare providers clean their hands with soap and water or an alcohol based hand  before caring for you or your wound.      CHLORHEXIDINE CLOTH INSTRUCTIONS  The morning of surgery follow these instructions using the Chlorhexidine cloths you've been given.  These steps reduce bacteria on the body.  Do not use the cloths near your eyes, ears mouth, genitalia or on open wounds.  Throw the cloths away after use but do not try to flush them down a toilet.      Open and remove one cloth at a time from the package.    Leave the cloth unfolded and begin the bathing.  Massage the skin with the cloths using gentle pressure to remove bacteria.  Do not scrub harshly.   Follow the steps below with one 2% CHG cloth per area (6 total cloths).  One cloth for neck, shoulders and  chest.  One cloth for both arms, hands, fingers and underarms (do underarms last).  One cloth for the abdomen followed by groin.  One cloth for right leg and foot including between the toes.  One cloth for left leg and foot including between the toes.  The last cloth is to be used for the back of the neck, back and buttocks.    Allow the CHG to air dry 3 minutes on the skin which will give it time to work and decrease the chance of irritation.  The skin may feel sticky until it is dry.  Do not rinse with water or any other liquid or you will lose the beneficial effects of the CHG.  If mild skin irritation occurs, do rinse the skin to remove the CHG.  Report this to the nurse at time of admission.  Do not apply lotions, creams, ointments, deodorants or perfumes after using the clothes. Dress in clean clothes before coming to the hospital.     Day of surgery:  Your arrival time is approximately two hours before your scheduled surgery time.  Upon arrival, a Pre-op nurse and Anesthesiologist will review your health history, obtain vital signs, and answer questions you may have.  The only belongings needed at this time will be a list of your home medications and if applicable your C-PAP/BI-PAP machine.  A Pre-op nurse will start an IV and you may receive medication in preparation for surgery, including something to help you relax.     Please be aware that surgery does come with discomfort.  We want to make every effort to control your discomfort so please discuss any uncontrolled symptoms with your nurse.   Your doctor will most likely have prescribed pain medications.      If you are going home after surgery you will receive individualized written care instructions before being discharged.  A responsible adult must drive you to and from the hospital on the day of your surgery and stay with you for 24 hours.  Discharge prescriptions can be filled by the hospital pharmacy during regular pharmacy hours.  If you are having  surgery late in the day/evening your prescription may be e-prescribed to your pharmacy.  Please verify your pharmacy hours or chose a 24 hour pharmacy to avoid not having access to your prescription because your pharmacy has closed for the day.    If you are staying overnight following surgery, you will be transported to your hospital room following the recovery period.  [facility] has all private rooms.    If you have any questions please call Pre-Admission Testing at [phone].  Deductibles and co-payments are collected on the day of service. Please be prepared to pay the required co-pay, deductible or deposit on the day of service as defined by your plan.    Call your surgeon immediately if you experience any of the following symptoms:  Sore Throat  Shortness of Breath or difficulty breathing  Cough  Chills  Body soreness or muscle pain  Headache  Fever  New loss of taste or smell  Do not arrive for your surgery ill.  Your procedure will need to be rescheduled to another time.  You will need to call your physician before the day of surgery to avoid any unnecessary exposure to hospital staff as well as other patients.

## 2022-10-20 ENCOUNTER — HOSPITAL ENCOUNTER (OUTPATIENT)
Facility: HOSPITAL | Age: 67
Setting detail: HOSPITAL OUTPATIENT SURGERY
Discharge: HOME | End: 2022-10-20
Attending: SURGERY | Admitting: SURGERY

## 2022-10-20 ENCOUNTER — APPOINTMENT (OUTPATIENT)
Dept: GENERAL RADIOLOGY | Facility: HOSPITAL | Age: 67
End: 2022-10-20

## 2022-10-20 ENCOUNTER — ANESTHESIA EVENT (OUTPATIENT)
Dept: PERIOP | Facility: HOSPITAL | Age: 67
End: 2022-10-20

## 2022-10-20 ENCOUNTER — ANESTHESIA (OUTPATIENT)
Dept: PERIOP | Facility: HOSPITAL | Age: 67
End: 2022-10-20

## 2022-10-20 VITALS
TEMPERATURE: 98.6 F | OXYGEN SATURATION: 96 % | SYSTOLIC BLOOD PRESSURE: 128 MMHG | HEART RATE: 72 BPM | DIASTOLIC BLOOD PRESSURE: 69 MMHG | RESPIRATION RATE: 16 BRPM

## 2022-10-20 DIAGNOSIS — I70.213 ATHEROSCLEROSIS OF NATIVE ARTERIES OF EXTREMITIES WITH INTERMITTENT CLAUDICATION, BILATERAL LEGS: Primary | Chronic | ICD-10-CM

## 2022-10-20 DIAGNOSIS — T82.868A: ICD-10-CM

## 2022-10-20 PROCEDURE — C1894 INTRO/SHEATH, NON-LASER: HCPCS | Performed by: SURGERY

## 2022-10-20 PROCEDURE — 25010000002 FENTANYL CITRATE (PF) 100 MCG/2ML SOLUTION: Performed by: NURSE ANESTHETIST, CERTIFIED REGISTERED

## 2022-10-20 PROCEDURE — 25010000002 PHENYLEPHRINE 10 MG/ML SOLUTION 5 ML VIAL: Performed by: NURSE ANESTHETIST, CERTIFIED REGISTERED

## 2022-10-20 PROCEDURE — C1725 CATH, TRANSLUMIN NON-LASER: HCPCS | Performed by: SURGERY

## 2022-10-20 PROCEDURE — C1887 CATHETER, GUIDING: HCPCS | Performed by: SURGERY

## 2022-10-20 PROCEDURE — 25010000002 DEXAMETHASONE SODIUM PHOSPHATE 20 MG/5ML SOLUTION: Performed by: NURSE ANESTHETIST, CERTIFIED REGISTERED

## 2022-10-20 PROCEDURE — 25010000002 VANCOMYCIN 10 G RECONSTITUTED SOLUTION: Performed by: SURGERY

## 2022-10-20 PROCEDURE — C1769 GUIDE WIRE: HCPCS | Performed by: SURGERY

## 2022-10-20 PROCEDURE — 25010000002 NEOSTIGMINE 5 MG/10ML SOLUTION: Performed by: NURSE ANESTHETIST, CERTIFIED REGISTERED

## 2022-10-20 PROCEDURE — C1874 STENT, COATED/COV W/DEL SYS: HCPCS | Performed by: SURGERY

## 2022-10-20 PROCEDURE — 25010000002 HEPARIN (PORCINE) PER 1000 UNITS: Performed by: SURGERY

## 2022-10-20 PROCEDURE — 25010000002 ALTEPLASE 2 MG RECONSTITUTED SOLUTION: Performed by: SURGERY

## 2022-10-20 PROCEDURE — C1760 CLOSURE DEV, VASC: HCPCS | Performed by: SURGERY

## 2022-10-20 PROCEDURE — 0 LIDOCAINE 1 % SOLUTION 20 ML VIAL: Performed by: SURGERY

## 2022-10-20 PROCEDURE — 25010000002 PROPOFOL 10 MG/ML EMULSION: Performed by: NURSE ANESTHETIST, CERTIFIED REGISTERED

## 2022-10-20 PROCEDURE — C1724 CATH, TRANS ATHEREC,ROTATION: HCPCS | Performed by: SURGERY

## 2022-10-20 PROCEDURE — 25010000002 HEPARIN (PORCINE) PER 1000 UNITS: Performed by: NURSE ANESTHETIST, CERTIFIED REGISTERED

## 2022-10-20 DEVICE — VIABAHN SX ENDO HEPARIN 18 RO 6MMX25CM 6FR120CM CATH
Type: IMPLANTABLE DEVICE | Site: ARTERY FEMORAL | Status: FUNCTIONAL
Brand: GORE VIABAHN ENDOPROSTHESIS WITH HEPARIN

## 2022-10-20 RX ORDER — SODIUM CHLORIDE 0.9 % (FLUSH) 0.9 %
3-10 SYRINGE (ML) INJECTION AS NEEDED
Status: DISCONTINUED | OUTPATIENT
Start: 2022-10-20 | End: 2022-10-20 | Stop reason: HOSPADM

## 2022-10-20 RX ORDER — LIDOCAINE HYDROCHLORIDE 10 MG/ML
0.5 INJECTION, SOLUTION EPIDURAL; INFILTRATION; INTRACAUDAL; PERINEURAL ONCE AS NEEDED
Status: DISCONTINUED | OUTPATIENT
Start: 2022-10-20 | End: 2022-10-20 | Stop reason: HOSPADM

## 2022-10-20 RX ORDER — LIDOCAINE HYDROCHLORIDE 20 MG/ML
INJECTION, SOLUTION EPIDURAL; INFILTRATION; INTRACAUDAL; PERINEURAL AS NEEDED
Status: DISCONTINUED | OUTPATIENT
Start: 2022-10-20 | End: 2022-10-20 | Stop reason: SURG

## 2022-10-20 RX ORDER — MIDAZOLAM HYDROCHLORIDE 1 MG/ML
0.5 INJECTION INTRAMUSCULAR; INTRAVENOUS
Status: DISCONTINUED | OUTPATIENT
Start: 2022-10-20 | End: 2022-10-20 | Stop reason: HOSPADM

## 2022-10-20 RX ORDER — SODIUM CHLORIDE 0.9 % (FLUSH) 0.9 %
3 SYRINGE (ML) INJECTION EVERY 12 HOURS SCHEDULED
Status: DISCONTINUED | OUTPATIENT
Start: 2022-10-20 | End: 2022-10-20 | Stop reason: HOSPADM

## 2022-10-20 RX ORDER — FAMOTIDINE 10 MG/ML
20 INJECTION, SOLUTION INTRAVENOUS ONCE
Status: COMPLETED | OUTPATIENT
Start: 2022-10-20 | End: 2022-10-20

## 2022-10-20 RX ORDER — DEXAMETHASONE SODIUM PHOSPHATE 4 MG/ML
INJECTION, SOLUTION INTRA-ARTICULAR; INTRALESIONAL; INTRAMUSCULAR; INTRAVENOUS; SOFT TISSUE AS NEEDED
Status: DISCONTINUED | OUTPATIENT
Start: 2022-10-20 | End: 2022-10-20 | Stop reason: SURG

## 2022-10-20 RX ORDER — PROMETHAZINE HYDROCHLORIDE 25 MG/1
25 SUPPOSITORY RECTAL ONCE AS NEEDED
Status: DISCONTINUED | OUTPATIENT
Start: 2022-10-20 | End: 2022-10-20 | Stop reason: HOSPADM

## 2022-10-20 RX ORDER — SODIUM CHLORIDE, SODIUM LACTATE, POTASSIUM CHLORIDE, CALCIUM CHLORIDE 600; 310; 30; 20 MG/100ML; MG/100ML; MG/100ML; MG/100ML
9 INJECTION, SOLUTION INTRAVENOUS CONTINUOUS
Status: DISCONTINUED | OUTPATIENT
Start: 2022-10-20 | End: 2022-10-20 | Stop reason: HOSPADM

## 2022-10-20 RX ORDER — HYDROCODONE BITARTRATE AND ACETAMINOPHEN 7.5; 325 MG/1; MG/1
1 TABLET ORAL ONCE AS NEEDED
Status: DISCONTINUED | OUTPATIENT
Start: 2022-10-20 | End: 2022-10-20 | Stop reason: HOSPADM

## 2022-10-20 RX ORDER — GLYCOPYRROLATE 0.2 MG/ML
INJECTION INTRAMUSCULAR; INTRAVENOUS AS NEEDED
Status: DISCONTINUED | OUTPATIENT
Start: 2022-10-20 | End: 2022-10-20 | Stop reason: SURG

## 2022-10-20 RX ORDER — DIPHENHYDRAMINE HCL 25 MG
25 CAPSULE ORAL
Status: DISCONTINUED | OUTPATIENT
Start: 2022-10-20 | End: 2022-10-20 | Stop reason: HOSPADM

## 2022-10-20 RX ORDER — ROCURONIUM BROMIDE 10 MG/ML
INJECTION, SOLUTION INTRAVENOUS AS NEEDED
Status: DISCONTINUED | OUTPATIENT
Start: 2022-10-20 | End: 2022-10-20 | Stop reason: SURG

## 2022-10-20 RX ORDER — ONDANSETRON 2 MG/ML
4 INJECTION INTRAMUSCULAR; INTRAVENOUS ONCE AS NEEDED
Status: DISCONTINUED | OUTPATIENT
Start: 2022-10-20 | End: 2022-10-20 | Stop reason: HOSPADM

## 2022-10-20 RX ORDER — OXYCODONE AND ACETAMINOPHEN 7.5; 325 MG/1; MG/1
1 TABLET ORAL EVERY 4 HOURS PRN
Status: DISCONTINUED | OUTPATIENT
Start: 2022-10-20 | End: 2022-10-20 | Stop reason: HOSPADM

## 2022-10-20 RX ORDER — FLUMAZENIL 0.1 MG/ML
0.2 INJECTION INTRAVENOUS AS NEEDED
Status: DISCONTINUED | OUTPATIENT
Start: 2022-10-20 | End: 2022-10-20 | Stop reason: HOSPADM

## 2022-10-20 RX ORDER — LABETALOL HYDROCHLORIDE 5 MG/ML
5 INJECTION, SOLUTION INTRAVENOUS
Status: DISCONTINUED | OUTPATIENT
Start: 2022-10-20 | End: 2022-10-20 | Stop reason: HOSPADM

## 2022-10-20 RX ORDER — HYDROMORPHONE HYDROCHLORIDE 1 MG/ML
0.5 INJECTION, SOLUTION INTRAMUSCULAR; INTRAVENOUS; SUBCUTANEOUS
Status: DISCONTINUED | OUTPATIENT
Start: 2022-10-20 | End: 2022-10-20 | Stop reason: HOSPADM

## 2022-10-20 RX ORDER — FENTANYL CITRATE 50 UG/ML
50 INJECTION, SOLUTION INTRAMUSCULAR; INTRAVENOUS
Status: DISCONTINUED | OUTPATIENT
Start: 2022-10-20 | End: 2022-10-20 | Stop reason: HOSPADM

## 2022-10-20 RX ORDER — PROMETHAZINE HYDROCHLORIDE 25 MG/1
25 TABLET ORAL ONCE AS NEEDED
Status: DISCONTINUED | OUTPATIENT
Start: 2022-10-20 | End: 2022-10-20 | Stop reason: HOSPADM

## 2022-10-20 RX ORDER — IBUPROFEN 600 MG/1
600 TABLET ORAL ONCE AS NEEDED
Status: DISCONTINUED | OUTPATIENT
Start: 2022-10-20 | End: 2022-10-20 | Stop reason: HOSPADM

## 2022-10-20 RX ORDER — NALOXONE HCL 0.4 MG/ML
0.2 VIAL (ML) INJECTION AS NEEDED
Status: DISCONTINUED | OUTPATIENT
Start: 2022-10-20 | End: 2022-10-20 | Stop reason: HOSPADM

## 2022-10-20 RX ORDER — EPHEDRINE SULFATE 50 MG/ML
5 INJECTION, SOLUTION INTRAVENOUS ONCE AS NEEDED
Status: DISCONTINUED | OUTPATIENT
Start: 2022-10-20 | End: 2022-10-20 | Stop reason: HOSPADM

## 2022-10-20 RX ORDER — FENTANYL CITRATE 50 UG/ML
INJECTION, SOLUTION INTRAMUSCULAR; INTRAVENOUS AS NEEDED
Status: DISCONTINUED | OUTPATIENT
Start: 2022-10-20 | End: 2022-10-20 | Stop reason: SURG

## 2022-10-20 RX ORDER — DIPHENHYDRAMINE HYDROCHLORIDE 50 MG/ML
12.5 INJECTION INTRAMUSCULAR; INTRAVENOUS
Status: DISCONTINUED | OUTPATIENT
Start: 2022-10-20 | End: 2022-10-20 | Stop reason: HOSPADM

## 2022-10-20 RX ORDER — HEPARIN SODIUM 1000 [USP'U]/ML
INJECTION, SOLUTION INTRAVENOUS; SUBCUTANEOUS AS NEEDED
Status: DISCONTINUED | OUTPATIENT
Start: 2022-10-20 | End: 2022-10-20 | Stop reason: SURG

## 2022-10-20 RX ORDER — PROPOFOL 10 MG/ML
VIAL (ML) INTRAVENOUS AS NEEDED
Status: DISCONTINUED | OUTPATIENT
Start: 2022-10-20 | End: 2022-10-20 | Stop reason: SURG

## 2022-10-20 RX ORDER — NEOSTIGMINE METHYLSULFATE 0.5 MG/ML
INJECTION, SOLUTION INTRAVENOUS AS NEEDED
Status: DISCONTINUED | OUTPATIENT
Start: 2022-10-20 | End: 2022-10-20 | Stop reason: SURG

## 2022-10-20 RX ORDER — TRAMADOL HYDROCHLORIDE 50 MG/1
50 TABLET ORAL EVERY 6 HOURS PRN
Qty: 20 TABLET | Refills: 0 | Status: SHIPPED | OUTPATIENT
Start: 2022-10-20 | End: 2023-01-25

## 2022-10-20 RX ORDER — HYDRALAZINE HYDROCHLORIDE 20 MG/ML
5 INJECTION INTRAMUSCULAR; INTRAVENOUS
Status: DISCONTINUED | OUTPATIENT
Start: 2022-10-20 | End: 2022-10-20 | Stop reason: HOSPADM

## 2022-10-20 RX ADMIN — FENTANYL CITRATE 50 MCG: 50 INJECTION, SOLUTION INTRAMUSCULAR; INTRAVENOUS at 12:26

## 2022-10-20 RX ADMIN — ROCURONIUM BROMIDE 50 MG: 10 INJECTION, SOLUTION INTRAVENOUS at 12:26

## 2022-10-20 RX ADMIN — FAMOTIDINE 20 MG: 10 INJECTION INTRAVENOUS at 11:54

## 2022-10-20 RX ADMIN — DEXAMETHASONE SODIUM PHOSPHATE 10 MG: 4 INJECTION, SOLUTION INTRAMUSCULAR; INTRAVENOUS at 12:44

## 2022-10-20 RX ADMIN — VANCOMYCIN HYDROCHLORIDE 1250 MG: 10 INJECTION, POWDER, LYOPHILIZED, FOR SOLUTION INTRAVENOUS at 12:11

## 2022-10-20 RX ADMIN — NEOSTIGMINE METHYLSULFATE 2 MG: 0.5 INJECTION INTRAVENOUS at 14:17

## 2022-10-20 RX ADMIN — HEPARIN SODIUM 2500 UNITS: 1000 INJECTION INTRAVENOUS; SUBCUTANEOUS at 13:29

## 2022-10-20 RX ADMIN — GLYCOPYRROLATE 0.2 MCG: 1 INJECTION INTRAMUSCULAR; INTRAVENOUS at 14:17

## 2022-10-20 RX ADMIN — SODIUM CHLORIDE, POTASSIUM CHLORIDE, SODIUM LACTATE AND CALCIUM CHLORIDE 9 ML/HR: 600; 310; 30; 20 INJECTION, SOLUTION INTRAVENOUS at 11:49

## 2022-10-20 RX ADMIN — PROPOFOL 200 MG: 10 INJECTION, EMULSION INTRAVENOUS at 12:26

## 2022-10-20 RX ADMIN — HEPARIN SODIUM 7500 UNITS: 1000 INJECTION INTRAVENOUS; SUBCUTANEOUS at 12:49

## 2022-10-20 RX ADMIN — LIDOCAINE HYDROCHLORIDE 100 MG: 20 INJECTION, SOLUTION EPIDURAL; INFILTRATION; INTRACAUDAL; PERINEURAL at 12:26

## 2022-10-20 RX ADMIN — FENTANYL CITRATE 50 MCG: 50 INJECTION, SOLUTION INTRAMUSCULAR; INTRAVENOUS at 13:50

## 2022-10-20 RX ADMIN — PHENYLEPHRINE HYDROCHLORIDE 1 MCG/KG/MIN: 10 INJECTION INTRAVENOUS at 12:37

## 2022-10-20 RX ADMIN — APIXABAN 5 MG: 5 TABLET, FILM COATED ORAL at 14:59

## 2022-10-20 NOTE — ANESTHESIA PROCEDURE NOTES
Airway  Urgency: elective    Airway not difficult    General Information and Staff    Patient location during procedure: OR  Anesthesiologist: Parish Mayes MD  CRNA/CAA: Zoey Villaseñor CRNA    Indications and Patient Condition  Indications for airway management: airway protection    Preoxygenated: yes  Mask difficulty assessment: 2 - vent by mask + OA or adjuvant +/- NMBA    Final Airway Details  Final airway type: endotracheal airway      Successful airway: ETT  Cuffed: yes   Successful intubation technique: direct laryngoscopy  Facilitating devices/methods: intubating stylet  Endotracheal tube insertion site: oral  Blade: Huerta  Blade size: 2  ETT size (mm): 8.0  Cormack-Lehane Classification: grade I - full view of glottis  Placement verified by: chest auscultation and capnometry   Measured from: lips  ETT/EBT  to lips (cm): 23  Number of attempts at approach: 1  Assessment: lips, teeth, and gum same as pre-op and atraumatic intubation    Additional Comments  Atraumatic, MOP to cuff, BSBE, no change to dentition, secured with tape

## 2022-10-20 NOTE — ANESTHESIA PREPROCEDURE EVALUATION
Anesthesia Evaluation     Patient summary reviewed and Nursing notes reviewed   history of anesthetic complications: prolonged sedation               Airway   Mallampati: II  TM distance: >3 FB  Neck ROM: limited  Dental      Pulmonary    (+) a smoker Current, COPD,   Cardiovascular     ECG reviewed  PT is on anticoagulation therapy  Patient on routine beta blocker and Beta blocker given within 24 hours of surgery  Rhythm: regular  Rate: normal    (+) hypertension, past MI , CAD, CABG, PVD, DVT, hyperlipidemia,       Neuro/Psych- negative ROS  GI/Hepatic/Renal/Endo - negative ROS     Musculoskeletal (-) negative ROS    Abdominal    Substance History - negative use     OB/GYN negative ob/gyn ROS         Other      history of cancer active                  Anesthesia Plan    ASA 3     general     (I have reviewed the patient's history with the patient and the chart, including all pertinent laboratory results and imaging. I have explained the risks of anesthesia including but not limited to dental damage, corneal abrasion, nerve injury, MI, stroke, and death. Questions asked and answered. Anesthetic plan discussed with patient and team as indicated. Patient expressed understanding of the above.  )  intravenous induction     Anesthetic plan, risks, benefits, and alternatives have been provided, discussed and informed consent has been obtained with: patient.        CODE STATUS:

## 2022-10-20 NOTE — ANESTHESIA POSTPROCEDURE EVALUATION
Patient: Yehuda Weaver    Procedure Summary     Date: 10/20/22 [facility] / [facility]    Anesthesia Start: 1219 Anesthesia Stop: 1433    Procedure: LEFT SUPERFICIAL FEMORAL ARTERY LASER ATHERECTOMY AND COVERED STENT, ANGIOPLASTY OF POPLITEAL AND TIBIAL PERINEAL TRUNK AND PHARMACOLOGIC THROMBOLYSIS (Left) Diagnosis:     Surgeons: Donna Bean Jr., MD Provider: Parish Mayes MD    Anesthesia Type: general ASA Status: 3          Anesthesia Type: general    Vitals  Vitals Value Taken Time   /78 10/20/22 1646   Temp 37 °C (98.6 °F) 10/20/22 1430   Pulse 72 10/20/22 1708   Resp 10 10/20/22 1430   SpO2 93 % 10/20/22 1708   Vitals shown include unvalidated device data.        Post Anesthesia Care and Evaluation    Patient location during evaluation: bedside  Patient participation: complete - patient participated  Level of consciousness: awake and alert  Pain management: adequate    Airway patency: patent  Anesthetic complications: No anesthetic complications    Cardiovascular status: acceptable  Respiratory status: acceptable  Hydration status: acceptable    Comments: /61 (BP Location: Left arm, Patient Position: Lying)   Pulse 72   Temp 37 °C (98.6 °F) (Oral)   Resp 16   SpO2 95%

## 2022-10-20 NOTE — DISCHARGE INSTRUCTIONS
YOU HAD A 5 MG ELIQUIS AT 2:59 PM      [facility]  Corrie Alvarez, Vikas, Sterling, Damian, Tamar, Roland,Michael, Deng  [address]  [place] [address]  [phone]      Discharge Instructions for Angiogram    Go home, rest and take it easy today.      You may experience some dizziness or memory loss from the anesthesia.  This may last for the next 24 hours.  Someone should plan on staying with you for the first 24 hours for your safety.    Do not make any important legal decisions or sign any legal papers for the next 24 hours.      Eat and drink lightly today.  Start off with liquids, jello, soup, crackers or other bland foods at first. You may advance your diet tomorrow as tolerated as long as you do not experience any nausea or vomiting.    You may resume routine medications including blood thinners. However, Glucophage should be not be started for 72 hours after the dye is given.      No lifting over 10 pounds and no strenuous activity for the next 2-3 days.    Try not to strain or bear down when your bowels move or when you empty your bladder.    Limit going up and down steps for 2 days.    No driving for the remainder of the day.  You may resume limited driving tomorrow if necessary.      You may shower tomorrow.  No bath or hot tubs for at least 2 days after the procedure.          Leave the pressure dressing on until tomorrow morning.  You may then take this off, as well as the small see through dressing with gauze tomorrow.  If it doesn’t come off easily, do not pull this off.  If it is stuck, shower and let the warm water loosen it before removal.       Check your groin dressing regularly for bleeding through the dressing (under the pressure dressing).  A small amount of blood contained by the gauze is normal; the whole dressing should not be filled with blood or leaking out under the sides.     If you experience bleeding through the gauze/clear sticky dressing, lay  flat and have someone apply direct pressure for 15 minutes.  If bleeding has stopped after this, you may put a clean gauze and tape over the area.  Continue to lie flat for 1-2 hours.  If bleeding continues after 15 minutes of pressure, call us at the number listed above.  There is an MD available after hours.      If you experience heavy bleeding or large swelling, continue to hold firm pressure and              call 911.  Do not call the MD on call.      If you experience pain or discomfort you may take Tylenol or Ibuprofen, whichever you normally use for minor discomfort, unless otherwise instructed.        If the MD gives you a prescription for narcotic pain pills (Tylenol #3, Vicodin, Hydrocodone, Oxycodone or Percocet), you cannot drive a vehicle or operate machinery while taking these.     Severe pain is not expected after this procedure.  If you experience severe pain, please call our office at [phone].     Remember to contact our office for any of the following:    Fever > 101 degrees  Severe pain that cannot be controlled by taking your pain pills  Severe nausea or vomiting   Significant bleeding of your incisions  Drainage that has a bad smell or is yellow or green in appearance  Any other questions or concerns

## 2022-10-20 NOTE — OP NOTE
Operative Note  Date of Admission:  10/20/2022  OR Date: 10/20/2022    Pre-op Diagnosis: Status post revascularization of left lower extremity with superficial femoral and popliteal arteries stents with severe in-stent restenosis    Post-op Diagnosis:   Status post revascularization of left lower extremity with superficial femoral and popliteal artery stents with occlusion and rest pain; severe stenosis of distal left external iliac/common femoral artery secondary to closure device    Procedure:   1) duplex ultrasound-guided percutaneous access of the right common femoral artery with abdominal aortogram and left lower extremity angiogram; laser atherectomy of the superficial femoral and popliteal arteries; pharmacologic thrombolysis with tPA; angioplasty of the superficial femoral-popliteal arteries with 5 mm x 100 mm balloon and angioplasty of distal popliteal and tibioperoneal trunk with 3.5 mm balloon; covered stent placement of superficial femoral and proximal popliteal arteries with 6 mm x 250 mm and 6 mm x 150 mm Viabahn covered stents; angioplasty of left external iliac/common femoral artery with 8 mm x 20 mm angioplasty balloon    Surgeon: Donna Bean Jr, MD    Assistant: KHANH Franco CSA    Anesthesia: General    Staff:   Circulator: Parker Whitley RN; Jacki Hood RN  Laser Staff: Bert Resendez RN  Scrub Person: Yovani Spence; Nina Lund, CST  Vascular Radiology Technician: Maxime Lee    Estimated Blood Loss: Minimal    Specimens: None  * No orders in the log *    Complications: None apparent    Findings: Unexpected occlusion of entire left superficial femoral artery and proximal popliteal artery.  Outflow stenotic changes in the distal popliteal artery and tibial peroneal trunk, severe.  All the above improved with above-mentioned treatment.  Previous closure device stenosis treated with balloon angioplasty.    Indications:  As in preop diagnosis           Procedure: Patient was  placed on operative table supine position.  After general anesthesia was achieved patient was prepped in the umbilicus to the knees using ChloraPrep and draped in usual sterile fashion.  Using duplex ultrasound the right common femoral artery was identified and accessed percutaneously under direct vision.  Guidewire was advanced and a 6 Swedish sheath placed over the guidewire.  A pigtail catheter was placed over the guidewire and placed in the upper abdominal aorta.  An aortogram was performed using 20 cc of contrast at 10 cc/s.  This demonstrated mild dilatation of the distal infrarenal abdominal aorta but common iliac, hypogastric, and external iliac arteries essentially normal.  Both renal arteries were normal.  There was severe stenosis noted in the distal left external iliac artery/proximal common femoral artery secondary to a previously placed closure device.  Both common femoral arteries were widely patent as were profundofemoral arteries.  There was occlusion of the superficial femoral artery on the left about 2 cm beyond its origin.  There was reconstitution of the tibial peroneal trunk through collaterals with three-vessel runoff but primarily through the peroneal and posterior tibial arteries.  7500 units of heparin was then given intravenously.  A rim catheter was placed over the guidewire and the contralateral left common iliac artery was selected.  Guidewire was advanced down into the superficial femoral artery.  A 7 Swedish by 65 cm long sheath was placed over the guidewire to the left common femoral artery.  Using a crossing catheter the guidewire was then advanced down into the tibioperoneal trunk.  Laser atherectomy was then performed using the 2.3 turbo Elite catheter with a fluence of 60 and a rate of 80.  3 passes were made from the origin of the superficial femoral artery to the mid popliteal artery.  Following this an angiogram demonstrated still minimal flow through the stented area.   Therefore the entire length and proximal popliteal artery were balloon dilated using a 5 mm x 100 mm balloon catheter up to 24 yaz for 2 minutes each inflation, 3 inflations to achieve this.  After this an injection through the sheath demonstrated flow through the stented superficial femoral-popliteal arteries but there was occlusion of the distal popliteal artery.  Therefore 4 mg of tPA were injected through the crossing catheter which was placed in the mid popliteal artery.  This was allowed to dwell for several minutes.  The guidewire was advanced down into the peroneal artery.  This was first treated with a 2.5 x 100 mm angioplasty balloon.  Hand-held injection through the crossing catheter demonstrated now patency of the tibial vessels although the anterior tibial was fairly diseased at its origin.  Covered stents were then placed from the origin of the superficial femoral artery to the popliteal artery just above the knee joint.  This required 2 6 mm stents measuring 250 mm and 150 mm long with 3 cm of overlap.  These were postdilated with a 6 mm balloon.  Following this an angiogram through the sheath demonstrated good flow through the stented superficial femoral-popliteal arteries but still persistent stenotic changes noted in the distal popliteal artery and tibial peroneal trunk.  These were treated with a 3.5 x 80 mm angioplasty balloon up to 14 yaz for 2 minutes.  Following this another injection demonstrated good flow noted distally in all 3 tibial vessels flow into the foot.  Sheath was then pulled back in the stenotic area caused by the previously placed closure device was treated with an 8 mm x 20 mm angioplasty balloon up to 14 yaz for 2 minutes.  Following this an injection through the sheath demonstrated complete resolution of this.  The puncture site was then treated with a StarClose device with near immediate hemostasis.  Additional manual pressure was held with a D-Stat dry hemostatic patch for  10 minutes.  Hemostasis was checked and noted be satisfactory.  Patient had strong Doppler signals in the dorsalis pedis and posterior tibial arteries on the left and strong Doppler dorsalis pedis artery on the right following the procedure.  Both feet are warm and pink.  Sterile dressings were applied.  The patient was then transported to recovery area in satisfactory condition.        Radiographic Findings:  1) as described above      There are no hospital problems to display for this patient.     Donna Bean Jr., MD     Date: 10/20/2022  Time: 14:32 EDT

## 2022-10-25 NOTE — H&P
H&P from 10/5 from the office on the chart.  This has been reviewed and no changes from that dictation.

## 2023-01-20 ENCOUNTER — OFFICE VISIT (OUTPATIENT)
Dept: PODIATRY | Facility: CLINIC | Age: 68
End: 2023-01-20
Payer: MEDICARE

## 2023-01-20 VITALS
WEIGHT: 187 LBS | TEMPERATURE: 96.7 F | BODY MASS INDEX: 29.35 KG/M2 | HEIGHT: 67 IN | OXYGEN SATURATION: 98 % | DIASTOLIC BLOOD PRESSURE: 82 MMHG | HEART RATE: 61 BPM | SYSTOLIC BLOOD PRESSURE: 140 MMHG

## 2023-01-20 DIAGNOSIS — M79.672 LEFT FOOT PAIN: ICD-10-CM

## 2023-01-20 DIAGNOSIS — B35.1 ONYCHOMYCOSIS: ICD-10-CM

## 2023-01-20 DIAGNOSIS — I73.9 PERIPHERAL VASCULAR DISEASE: Primary | ICD-10-CM

## 2023-01-20 PROCEDURE — 99213 OFFICE O/P EST LOW 20 MIN: CPT | Performed by: PODIATRIST

## 2023-01-20 RX ORDER — DILTIAZEM HYDROCHLORIDE 240 MG/1
240 CAPSULE, EXTENDED RELEASE ORAL DAILY
COMMUNITY
Start: 2022-12-23 | End: 2023-01-25

## 2023-01-20 RX ORDER — CETIRIZINE HYDROCHLORIDE 10 MG/1
10 TABLET ORAL DAILY
COMMUNITY
Start: 2022-12-22

## 2023-01-20 NOTE — PROGRESS NOTES
[facility] - PODIATRY    Today's Date: 01/20/23    Patient Name: Yehuda Weaver  MRN: [id number]  CSN: [id number]  PCP: Koby Hernandez MD,  Referring Provider: Koby Hernandez MD    SUBJECTIVE     Chief Complaint   Patient presents with   • Left Foot - Establish Care, Nail Problem     Dr. Hernandez tried to do a nail avulsion but pt could not tolerate it, he stated he did not get numb   Pt finished abx yesterday    • Right Foot - Establish Care, Nail Problem     HPI: Yehuda Weaver, a 67 y.o.male, presents to clinic.    Patient is a 67-year-old male presenting with left great toe pain.  Patient states that he had the toenail that was going to get removed but he was unable to tolerate it.  Patient states the area that most painful is on the inside of his big toe.  He does have thickened nails on both of his feet he.  He also has a history of vascular intervention with stents in his left and right legs.  He states his left leg gets cool and it is giving him a lot of pain when he is up walking.    Past Medical History:   Diagnosis Date   • 06/2022     RIGHT LOWER LEG   • Anticoagulated    • Atherosclerosis of native arteries of extremities with intermittent claudication, bilateral legs (HCC)    • Bladder cancer (HCC) 2004   • COPD (chronic obstructive pulmonary disease) ([facility])    • Coronary artery disease    • Diverticulitis    • Heart attack (HCC) 2007   • History of COVID-19 09/2022   • Hx of colonic polyps    • Hyperlipidemia    • Hypertension    • Onychomycosis    • PVD (peripheral vascular disease) ([facility])    • Slow to wake up after anesthesia      Past Surgical History:   Procedure Laterality Date   • APPENDECTOMY     • ATHRECTOMY ILIAC, FEMORAL, TIBIAL ARTERY Left 10/20/2022    Procedure: LEFT SUPERFICIAL FEMORAL ARTERY LASER ATHERECTOMY AND COVERED STENT, ANGIOPLASTY OF POPLITEAL AND TIBIAL PERINEAL TRUNK AND PHARMACOLOGIC THROMBOLYSIS;  Surgeon: Donna Bean Jr., MD;  Location: [facility]  HYBRID OR 18/19;  Service: Vascular;  Laterality: Left;   • BACK SURGERY     • BLADDER SURGERY  2004   • COLONOSCOPY  07/22/2020    Dr. Castano   • CORONARY ARTERY BYPASS GRAFT  2007    Triple coronary bypass   • EKOS CATHETER PLACEMENT Right 06/16/2022    Procedure: Right lower extremity arteriogram via left groin approach with placement of thrombolysis infusion catheter;  Surgeon: Donna Bean Jr., MD;  Location: [facility] OR 18/19;  Service: Vascular;  Laterality: Right;   • EKOS CATHETER REMOVAL N/A 06/17/2022    Procedure: EKOS CATHETER REMOVAL, RIGHT LOWER EXTREMITY ARTERIOGRAM WITH PERCUTANEOUS TRANSLUMINAL ANGIOPLASTY, ASPIRATION PNEUMBRA THROMBECTOMY, AND IVUS;  Surgeon: Yehuda Salgado MD;  Location: [facility] OR 18/19;  Service: Vascular;  Laterality: N/A;   • FEMORAL ARTERY STENT Right 06/17/2022   • HERNIA REPAIR     • LEG THROMBECTOMY/EMBOLECTOMY Right 06/17/2022   • UPPER GASTROINTESTINAL ENDOSCOPY  07/22/2020    Dr. Castano   • VASCULAR SURGERY Right 2022     Family History   Problem Relation Age of Onset   • Heart failure Mother    • Clotting disorder Father    • Heart attack Father    • Lung cancer Brother         Unsure of age   • Colon cancer Neg Hx    • Malig Hyperthermia Neg Hx      Social History     Socioeconomic History   • Marital status:    Tobacco Use   • Smoking status: Every Day     Packs/day: 1.00     Types: Cigarettes   • Smokeless tobacco: Never   Vaping Use   • Vaping Use: Never used   Substance and Sexual Activity   • Alcohol use: Not Currently   • Drug use: Never   • Sexual activity: Defer     Allergies   Allergen Reactions   • Penicillins Unknown - Low Severity     Current Outpatient Medications   Medication Sig Dispense Refill   • albuterol (PROVENTIL) (2.5 MG/3ML) 0.083% nebulizer solution Take 2.5 mg by nebulization Every 4 (Four) Hours As Needed for Wheezing.     • ALBUTEROL IN Inhale. RESCUE  INHALER     • aspirin 81 MG chewable tablet Chew 1  tablet Daily. CHECK WITH DR OLIVA TO SEE IF YOU SHOULD HOLD     • atorvastatin (LIPITOR) 80 MG tablet Take 1 tablet by mouth Every Night.     • cetirizine (zyrTEC) 10 MG tablet Take 10 mg by mouth Daily.     • Dilt- MG 24 hr capsule Take 240 mg by mouth Daily.     • doxazosin (CARDURA) 4 MG tablet Take 1 tablet by mouth Every Night.     • Eliquis 5 MG tablet tablet Take 1 tablet by mouth 2 (Two) Times a Day. CHECK WITH DR OLIVA TO SEE WHEN TO HOLD     • fluticasone (FLONASE) 50 MCG/ACT nasal spray 1 spray into the nostril(s) as directed by provider Every Night.     • lisinopril-hydrochlorothiazide (PRINZIDE,ZESTORETIC) 20-25 MG per tablet Take 1 tablet by mouth Daily.     • metoprolol succinate XL (TOPROL-XL) 100 MG 24 hr tablet Take 1 tablet by mouth Daily.     • montelukast (SINGULAIR) 10 MG tablet Take 1 tablet by mouth Every Night.     • multivitamin (THERAGRAN) tablet tablet Take 1 tablet by mouth Daily. HOLD FOR SURGERY     • Omega-3 Fatty Acids (fish oil) 1000 MG capsule capsule Take 1 capsule by mouth Daily With Breakfast. HOLD FOR SURGERY     • omeprazole (priLOSEC) 40 MG capsule Take 1 capsule by mouth Daily.     • dilTIAZem (TIAZAC) 240 MG 24 hr capsule 1 capsule Daily.     • traMADol (Ultram) 50 MG tablet Take 1 tablet by mouth Every 6 (Six) Hours As Needed for Moderate Pain. 20 tablet 0     No current facility-administered medications for this visit.     Review of Systems   Musculoskeletal:        Left foot and leg pain       OBJECTIVE     Vitals:    01/20/23 0941   BP: 140/82   Pulse: 61   Temp: 96.7 °F (35.9 °C)   SpO2: 98%       WBC   Date Value Ref Range Status   10/18/2022 8.36 3.40 - 10.80 10*3/mm3 Final     RBC   Date Value Ref Range Status   10/18/2022 5.08 4.14 - 5.80 10*6/mm3 Final     Hemoglobin   Date Value Ref Range Status   10/18/2022 14.9 13.0 - 17.7 g/dL Final     Hematocrit   Date Value Ref Range Status   10/18/2022 45.8 37.5 - 51.0 % Final     MCV   Date Value Ref Range Status    10/18/2022 90.2 79.0 - 97.0 fL Final     MCH   Date Value Ref Range Status   10/18/2022 29.3 26.6 - 33.0 pg Final     MCHC   Date Value Ref Range Status   10/18/2022 32.5 31.5 - 35.7 g/dL Final     RDW   Date Value Ref Range Status   10/18/2022 13.9 12.3 - 15.4 % Final     RDW-SD   Date Value Ref Range Status   10/18/2022 45.5 37.0 - 54.0 fl Final     MPV   Date Value Ref Range Status   10/18/2022 11.3 6.0 - 12.0 fL Final     Platelets   Date Value Ref Range Status   10/18/2022 172 140 - 450 10*3/mm3 Final     Neutrophil %   Date Value Ref Range Status   06/19/2022 70.2 42.7 - 76.0 % Final     Lymphocyte %   Date Value Ref Range Status   06/19/2022 18.2 (L) 19.6 - 45.3 % Final     Monocyte %   Date Value Ref Range Status   06/19/2022 9.6 5.0 - 12.0 % Final     Eosinophil %   Date Value Ref Range Status   06/19/2022 1.4 0.3 - 6.2 % Final     Basophil %   Date Value Ref Range Status   06/19/2022 0.2 0.0 - 1.5 % Final     Immature Grans %   Date Value Ref Range Status   06/18/2022 0.6 (H) 0.0 - 0.5 % Final     Neutrophils, Absolute   Date Value Ref Range Status   06/19/2022 8.72 (H) 1.70 - 7.00 10*3/mm3 Final     Lymphocytes, Absolute   Date Value Ref Range Status   06/19/2022 2.27 0.70 - 3.10 10*3/mm3 Final     Monocytes, Absolute   Date Value Ref Range Status   06/19/2022 1.19 (H) 0.10 - 0.90 10*3/mm3 Final     Eosinophils, Absolute   Date Value Ref Range Status   06/19/2022 0.18 0.00 - 0.40 10*3/mm3 Final     Basophils, Absolute   Date Value Ref Range Status   06/19/2022 0.03 0.00 - 0.20 10*3/mm3 Final     Immature Grans, Absolute   Date Value Ref Range Status   06/18/2022 0.07 (H) 0.00 - 0.05 10*3/mm3 Final     nRBC   Date Value Ref Range Status   06/18/2022 0.0 0.0 - 0.2 /100 WBC Final         Lab Results   Component Value Date    GLUCOSE 110 (H) 10/18/2022    BUN 14 10/18/2022    CREATININE 0.79 10/18/2022    BCR 17.7 10/18/2022    K 4.0 10/18/2022    CO2 26.0 10/18/2022    CALCIUM 8.9 10/18/2022    ALBUMIN  4.30 06/15/2022    AST 25 06/15/2022    ALT 38 06/15/2022       Patient seen in no apparent distress.      PHYSICAL EXAM:     Foot/Ankle Exam:       General:   Appearance: appears stated age and healthy    Orientation: AAOx3    Affect: appropriate    Gait: unimpaired    Shoe Gear:  Casual shoes    VASCULAR      Right Foot Vascularity   Normal vascular exam    Dorsalis pedis:  2+  Posterior tibial:  2+  Skin Temperature: warm    Edema Grading:  None  CFT:  < 3 seconds  Pedal Hair Growth:  Present  Varicosities: none       Left Foot Vascularity   Normal vascular exam    Dorsalis pedis:  1+  Posterior tibial:  1+  Skin Temperature: cool    Edema Grading:  None  CFT:  3  Pedal Hair Growth:  Present  Varicosities: none        NEUROLOGIC     Right Foot Neurologic   Normal sensation    Light touch sensation:  Normal  Vibratory sensation:  Normal  Hot/Cold sensation: normal    Protective Sensation using Freeman-Luisito Monofilament:  10     Left Foot Neurologic   Normal sensation    Light touch sensation:  Normal  Vibratory sensation:  Normal  Hot/cold sensation: normal    Protective Sensation using Freeman-Luisito Monofilament:  10     MUSCULOSKELETAL      Left Foot Musculoskeletal   Tenderness comment:  Patient exquisitely tender to left great toe, painful to the touch     MUSCLE STRENGTH     Right Foot Muscle Strength   Foot dorsiflexion:  4  Foot plantar flexion:  4  Foot inversion:  4  Foot eversion:  4     Left Foot Muscle Strength   Foot dorsiflexion:  4  Foot plantar flexion:  4  Foot inversion:  4  Foot eversion:  4     RANGE OF MOTION      Right Foot Range of Motion   Foot and ankle ROM within normal limits       Left Foot Range of Motion   Foot and ankle ROM within normal limits       DERMATOLOGIC     Right Foot Dermatologic   Skin: skin intact    Nails: onychomycosis and abnormally thick       Left Foot Dermatologic   Skin: skin intact    Nails: onychomycosis and abnormally thick          No results  found.    ASSESSMENT/PLAN     Diagnoses and all orders for this visit:    1. Peripheral vascular disease (HCC) (Primary)    2. Onychomycosis    3. Left foot pain      Patient is exquisitely tender to the left great toe, without any significant pathology present. His foot is slightly cool to the touch compared to the right and has a history of stents in his left leg.  He has pain to the left leg with exertion. He has a appointment scheduled with vascular surgery in the next 2 weeks.  Discussed with patient having him follow-up with vascular for full evaluation of his blood flow and then return to clinic after the appointment.  If symptoms get worse patient is go to the emergency department.    Comprehensive lower extremity examination and evaluation was performed.    Discussed findings and treatment plan including risks, benefits, and treatment options with patient in detail. Patient agreed with treatment plan.    Medications and allergies reviewed.  Reviewed available lab values along with other pertinent labs.  These were discussed with the patient.    An After Visit Summary was printed and given to the patient at discharge, including (if requested) any available informative/educational handouts regarding diagnosis, treatment, or medications. All questions were answered to patient/family satisfaction. Should symptoms fail to improve or worsen they agree to call or return to clinic or to go to the Emergency Department. Discussed the importance of following up with any needed screening tests/labs/specialist appointments and any requested follow-up recommended by me today. Importance of maintaining follow-up discussed and patient accepts that missed appointments can delay diagnosis and potentially lead to worsening of conditions.    No follow-ups on file., or sooner if acute issues arise.    This document has been electronically signed by Buster Olvera DPM on January 20, 2023 10:47 EST

## 2023-01-25 ENCOUNTER — PRE-ADMISSION TESTING (OUTPATIENT)
Dept: PREADMISSION TESTING | Facility: HOSPITAL | Age: 68
End: 2023-01-25
Payer: MEDICARE

## 2023-01-25 VITALS
OXYGEN SATURATION: 96 % | WEIGHT: 190.3 LBS | DIASTOLIC BLOOD PRESSURE: 71 MMHG | BODY MASS INDEX: 30.58 KG/M2 | TEMPERATURE: 98 F | HEART RATE: 83 BPM | RESPIRATION RATE: 16 BRPM | HEIGHT: 66 IN | SYSTOLIC BLOOD PRESSURE: 134 MMHG

## 2023-01-25 LAB
ANION GAP SERPL CALCULATED.3IONS-SCNC: 8 MMOL/L (ref 5–15)
BUN SERPL-MCNC: 13 MG/DL (ref 8–23)
BUN/CREAT SERPL: 14.6 (ref 7–25)
CALCIUM SPEC-SCNC: 8.8 MG/DL (ref 8.6–10.5)
CHLORIDE SERPL-SCNC: 100 MMOL/L (ref 98–107)
CO2 SERPL-SCNC: 25 MMOL/L (ref 22–29)
CREAT SERPL-MCNC: 0.89 MG/DL (ref 0.76–1.27)
DEPRECATED RDW RBC AUTO: 42 FL (ref 37–54)
EGFRCR SERPLBLD CKD-EPI 2021: 93.9 ML/MIN/1.73
ERYTHROCYTE [DISTWIDTH] IN BLOOD BY AUTOMATED COUNT: 13.4 % (ref 12.3–15.4)
GLUCOSE SERPL-MCNC: 232 MG/DL (ref 65–99)
HCT VFR BLD AUTO: 42.9 % (ref 37.5–51)
HGB BLD-MCNC: 13.9 G/DL (ref 13–17.7)
MCH RBC QN AUTO: 28.4 PG (ref 26.6–33)
MCHC RBC AUTO-ENTMCNC: 32.4 G/DL (ref 31.5–35.7)
MCV RBC AUTO: 87.6 FL (ref 79–97)
PLATELET # BLD AUTO: 248 10*3/MM3 (ref 140–450)
PMV BLD AUTO: 11.3 FL (ref 6–12)
POTASSIUM SERPL-SCNC: 4.1 MMOL/L (ref 3.5–5.2)
RBC # BLD AUTO: 4.9 10*6/MM3 (ref 4.14–5.8)
SODIUM SERPL-SCNC: 133 MMOL/L (ref 136–145)
WBC NRBC COR # BLD: 9.61 10*3/MM3 (ref 3.4–10.8)

## 2023-01-25 PROCEDURE — 36415 COLL VENOUS BLD VENIPUNCTURE: CPT

## 2023-01-25 PROCEDURE — 85027 COMPLETE CBC AUTOMATED: CPT

## 2023-01-25 PROCEDURE — 80048 BASIC METABOLIC PNL TOTAL CA: CPT

## 2023-01-25 RX ORDER — ASPIRIN 81 MG/1
81 TABLET, COATED ORAL DAILY
COMMUNITY
Start: 2023-01-18

## 2023-01-25 RX ORDER — CHLORHEXIDINE GLUCONATE 500 MG/1
CLOTH TOPICAL TAKE AS DIRECTED
Status: ON HOLD | COMMUNITY
End: 2023-02-02

## 2023-02-02 ENCOUNTER — ANESTHESIA (OUTPATIENT)
Dept: PERIOP | Facility: HOSPITAL | Age: 68
DRG: 253 | End: 2023-02-02
Payer: MEDICARE

## 2023-02-02 ENCOUNTER — HOSPITAL ENCOUNTER (INPATIENT)
Facility: HOSPITAL | Age: 68
LOS: 2 days | Discharge: HOME | DRG: 253 | End: 2023-02-04
Attending: SURGERY | Admitting: SURGERY
Payer: MEDICARE

## 2023-02-02 ENCOUNTER — ANESTHESIA EVENT (OUTPATIENT)
Dept: PERIOP | Facility: HOSPITAL | Age: 68
DRG: 253 | End: 2023-02-02
Payer: MEDICARE

## 2023-02-02 ENCOUNTER — APPOINTMENT (OUTPATIENT)
Dept: GENERAL RADIOLOGY | Facility: HOSPITAL | Age: 68
DRG: 253 | End: 2023-02-02
Payer: MEDICARE

## 2023-02-02 DIAGNOSIS — I73.9 PVD (PERIPHERAL VASCULAR DISEASE): Primary | ICD-10-CM

## 2023-02-02 LAB
APTT PPP: 29.8 SECONDS (ref 22.7–35.4)
BACTERIA UR QL AUTO: NORMAL /HPF
BILIRUB UR QL STRIP: NEGATIVE
CLARITY UR: CLEAR
COLOR UR: YELLOW
GLUCOSE UR STRIP-MCNC: NEGATIVE MG/DL
HGB UR QL STRIP.AUTO: NEGATIVE
HYALINE CASTS UR QL AUTO: NORMAL /LPF
INR PPP: 0.99 (ref 0.9–1.1)
KETONES UR QL STRIP: NEGATIVE
LEUKOCYTE ESTERASE UR QL STRIP.AUTO: NEGATIVE
NITRITE UR QL STRIP: NEGATIVE
PH UR STRIP.AUTO: 5.5 [PH] (ref 5–8)
PROT UR QL STRIP: NEGATIVE
PROTHROMBIN TIME: 13.2 SECONDS (ref 11.7–14.2)
RBC # UR STRIP: NORMAL /HPF
REF LAB TEST METHOD: NORMAL
SP GR UR STRIP: 1.01 (ref 1–1.03)
SQUAMOUS #/AREA URNS HPF: NORMAL /HPF
UROBILINOGEN UR QL STRIP: NORMAL
WBC # UR STRIP: NORMAL /HPF

## 2023-02-02 PROCEDURE — 041L09L BYPASS LEFT FEMORAL ARTERY TO POPLITEAL ARTERY WITH AUTOLOGOUS VENOUS TISSUE, OPEN APPROACH: ICD-10-PCS | Performed by: SURGERY

## 2023-02-02 PROCEDURE — 85730 THROMBOPLASTIN TIME PARTIAL: CPT | Performed by: SURGERY

## 2023-02-02 PROCEDURE — 25010000002 HEPARIN (PORCINE) PER 1000 UNITS: Performed by: NURSE ANESTHETIST, CERTIFIED REGISTERED

## 2023-02-02 PROCEDURE — 85610 PROTHROMBIN TIME: CPT | Performed by: SURGERY

## 2023-02-02 PROCEDURE — 04CS0ZZ EXTIRPATION OF MATTER FROM LEFT POSTERIOR TIBIAL ARTERY, OPEN APPROACH: ICD-10-PCS | Performed by: SURGERY

## 2023-02-02 PROCEDURE — 25010000002 FENTANYL CITRATE (PF) 50 MCG/ML SOLUTION: Performed by: NURSE ANESTHETIST, CERTIFIED REGISTERED

## 2023-02-02 PROCEDURE — 85347 COAGULATION TIME ACTIVATED: CPT

## 2023-02-02 PROCEDURE — 25010000002 DEXAMETHASONE SODIUM PHOSPHATE 20 MG/5ML SOLUTION: Performed by: NURSE ANESTHETIST, CERTIFIED REGISTERED

## 2023-02-02 PROCEDURE — 25010000002 PHENYLEPHRINE 10 MG/ML SOLUTION 5 ML VIAL: Performed by: NURSE ANESTHETIST, CERTIFIED REGISTERED

## 2023-02-02 PROCEDURE — 25010000002 FENTANYL CITRATE (PF) 50 MCG/ML SOLUTION: Performed by: ANESTHESIOLOGY

## 2023-02-02 PROCEDURE — 0 IOTHALAMATE 60 % SOLUTION: Performed by: SURGERY

## 2023-02-02 PROCEDURE — 25010000002 NEOSTIGMINE 5 MG/10ML SOLUTION: Performed by: NURSE ANESTHETIST, CERTIFIED REGISTERED

## 2023-02-02 PROCEDURE — 25010000002 PROPOFOL 10 MG/ML EMULSION: Performed by: NURSE ANESTHETIST, CERTIFIED REGISTERED

## 2023-02-02 PROCEDURE — 25010000002 HEPARIN (PORCINE) PER 1000 UNITS: Performed by: SURGERY

## 2023-02-02 PROCEDURE — 25010000002 ONDANSETRON PER 1 MG: Performed by: NURSE ANESTHETIST, CERTIFIED REGISTERED

## 2023-02-02 PROCEDURE — 25010000002 MIDAZOLAM PER 1 MG: Performed by: ANESTHESIOLOGY

## 2023-02-02 PROCEDURE — 25010000002 VANCOMYCIN 10 G RECONSTITUTED SOLUTION: Performed by: SURGERY

## 2023-02-02 PROCEDURE — 25010000002 VANCOMYCIN 1 G RECONSTITUTED SOLUTION

## 2023-02-02 PROCEDURE — 81001 URINALYSIS AUTO W/SCOPE: CPT | Performed by: SURGERY

## 2023-02-02 DEVICE — LIGACLIP MCA MULTIPLE CLIP APPLIERS, 20 SMALL CLIPS
Type: IMPLANTABLE DEVICE | Site: ARTERY FEMORAL | Status: FUNCTIONAL
Brand: LIGACLIP

## 2023-02-02 DEVICE — LIGACLIP MCA MULTIPLE CLIP APPLIERS, 20 MEDIUM CLIPS
Type: IMPLANTABLE DEVICE | Site: ARTERY FEMORAL | Status: FUNCTIONAL
Brand: LIGACLIP

## 2023-02-02 RX ORDER — MORPHINE SULFATE 2 MG/ML
1 INJECTION, SOLUTION INTRAMUSCULAR; INTRAVENOUS EVERY 4 HOURS PRN
Status: DISCONTINUED | OUTPATIENT
Start: 2023-02-02 | End: 2023-02-04 | Stop reason: HOSPADM

## 2023-02-02 RX ORDER — FENTANYL CITRATE 50 UG/ML
INJECTION, SOLUTION INTRAMUSCULAR; INTRAVENOUS
Status: COMPLETED | OUTPATIENT
Start: 2023-02-02 | End: 2023-02-02

## 2023-02-02 RX ORDER — NALOXONE HCL 0.4 MG/ML
0.2 VIAL (ML) INJECTION AS NEEDED
Status: DISCONTINUED | OUTPATIENT
Start: 2023-02-02 | End: 2023-02-02 | Stop reason: HOSPADM

## 2023-02-02 RX ORDER — TERAZOSIN 5 MG/1
5 CAPSULE ORAL NIGHTLY
Status: DISCONTINUED | OUTPATIENT
Start: 2023-02-02 | End: 2023-02-04 | Stop reason: HOSPADM

## 2023-02-02 RX ORDER — SODIUM CHLORIDE, SODIUM LACTATE, POTASSIUM CHLORIDE, CALCIUM CHLORIDE 600; 310; 30; 20 MG/100ML; MG/100ML; MG/100ML; MG/100ML
9 INJECTION, SOLUTION INTRAVENOUS CONTINUOUS
Status: DISCONTINUED | OUTPATIENT
Start: 2023-02-02 | End: 2023-02-03

## 2023-02-02 RX ORDER — MIDAZOLAM HYDROCHLORIDE 1 MG/ML
INJECTION INTRAMUSCULAR; INTRAVENOUS
Status: COMPLETED | OUTPATIENT
Start: 2023-02-02 | End: 2023-02-02

## 2023-02-02 RX ORDER — HYDRALAZINE HYDROCHLORIDE 20 MG/ML
5 INJECTION INTRAMUSCULAR; INTRAVENOUS
Status: DISCONTINUED | OUTPATIENT
Start: 2023-02-02 | End: 2023-02-02 | Stop reason: HOSPADM

## 2023-02-02 RX ORDER — MIDAZOLAM HYDROCHLORIDE 1 MG/ML
0.5 INJECTION INTRAMUSCULAR; INTRAVENOUS
Status: DISCONTINUED | OUTPATIENT
Start: 2023-02-02 | End: 2023-02-02 | Stop reason: HOSPADM

## 2023-02-02 RX ORDER — METOPROLOL SUCCINATE 100 MG/1
100 TABLET, EXTENDED RELEASE ORAL DAILY
Status: DISCONTINUED | OUTPATIENT
Start: 2023-02-02 | End: 2023-02-04 | Stop reason: HOSPADM

## 2023-02-02 RX ORDER — HYDROCODONE BITARTRATE AND ACETAMINOPHEN 5; 325 MG/1; MG/1
1 TABLET ORAL EVERY 4 HOURS PRN
Status: DISCONTINUED | OUTPATIENT
Start: 2023-02-02 | End: 2023-02-04 | Stop reason: HOSPADM

## 2023-02-02 RX ORDER — ONDANSETRON 2 MG/ML
INJECTION INTRAMUSCULAR; INTRAVENOUS AS NEEDED
Status: DISCONTINUED | OUTPATIENT
Start: 2023-02-02 | End: 2023-02-02 | Stop reason: SURG

## 2023-02-02 RX ORDER — SODIUM CHLORIDE 0.9 % (FLUSH) 0.9 %
10 SYRINGE (ML) INJECTION EVERY 12 HOURS SCHEDULED
Status: DISCONTINUED | OUTPATIENT
Start: 2023-02-02 | End: 2023-02-02 | Stop reason: HOSPADM

## 2023-02-02 RX ORDER — FENTANYL CITRATE 50 UG/ML
50 INJECTION, SOLUTION INTRAMUSCULAR; INTRAVENOUS
Status: DISCONTINUED | OUTPATIENT
Start: 2023-02-02 | End: 2023-02-02 | Stop reason: HOSPADM

## 2023-02-02 RX ORDER — CETIRIZINE HYDROCHLORIDE 10 MG/1
10 TABLET ORAL DAILY
Status: DISCONTINUED | OUTPATIENT
Start: 2023-02-02 | End: 2023-02-04 | Stop reason: HOSPADM

## 2023-02-02 RX ORDER — LABETALOL HYDROCHLORIDE 5 MG/ML
5 INJECTION, SOLUTION INTRAVENOUS
Status: DISCONTINUED | OUTPATIENT
Start: 2023-02-02 | End: 2023-02-02 | Stop reason: HOSPADM

## 2023-02-02 RX ORDER — PHENYLEPHRINE HCL IN 0.9% NACL 1 MG/10 ML
SYRINGE (ML) INTRAVENOUS AS NEEDED
Status: DISCONTINUED | OUTPATIENT
Start: 2023-02-02 | End: 2023-02-02 | Stop reason: SURG

## 2023-02-02 RX ORDER — PROMETHAZINE HYDROCHLORIDE 25 MG/1
25 TABLET ORAL ONCE AS NEEDED
Status: DISCONTINUED | OUTPATIENT
Start: 2023-02-02 | End: 2023-02-02 | Stop reason: HOSPADM

## 2023-02-02 RX ORDER — ALBUTEROL SULFATE 2.5 MG/3ML
2.5 SOLUTION RESPIRATORY (INHALATION) EVERY 4 HOURS PRN
Status: DISCONTINUED | OUTPATIENT
Start: 2023-02-02 | End: 2023-02-04 | Stop reason: HOSPADM

## 2023-02-02 RX ORDER — DILTIAZEM HYDROCHLORIDE 240 MG/1
240 CAPSULE, COATED, EXTENDED RELEASE ORAL
Status: DISCONTINUED | OUTPATIENT
Start: 2023-02-02 | End: 2023-02-04 | Stop reason: HOSPADM

## 2023-02-02 RX ORDER — FAMOTIDINE 10 MG/ML
20 INJECTION, SOLUTION INTRAVENOUS ONCE
Status: COMPLETED | OUTPATIENT
Start: 2023-02-02 | End: 2023-02-02

## 2023-02-02 RX ORDER — NEOSTIGMINE METHYLSULFATE 0.5 MG/ML
INJECTION, SOLUTION INTRAVENOUS AS NEEDED
Status: DISCONTINUED | OUTPATIENT
Start: 2023-02-02 | End: 2023-02-02 | Stop reason: SURG

## 2023-02-02 RX ORDER — PROPOFOL 10 MG/ML
VIAL (ML) INTRAVENOUS AS NEEDED
Status: DISCONTINUED | OUTPATIENT
Start: 2023-02-02 | End: 2023-02-02 | Stop reason: SURG

## 2023-02-02 RX ORDER — ROCURONIUM BROMIDE 10 MG/ML
INJECTION, SOLUTION INTRAVENOUS AS NEEDED
Status: DISCONTINUED | OUTPATIENT
Start: 2023-02-02 | End: 2023-02-02 | Stop reason: SURG

## 2023-02-02 RX ORDER — NALOXONE HCL 0.4 MG/ML
0.4 VIAL (ML) INJECTION
Status: DISCONTINUED | OUTPATIENT
Start: 2023-02-02 | End: 2023-02-04 | Stop reason: HOSPADM

## 2023-02-02 RX ORDER — HYDROMORPHONE HYDROCHLORIDE 1 MG/ML
0.5 INJECTION, SOLUTION INTRAMUSCULAR; INTRAVENOUS; SUBCUTANEOUS
Status: DISCONTINUED | OUTPATIENT
Start: 2023-02-02 | End: 2023-02-02 | Stop reason: HOSPADM

## 2023-02-02 RX ORDER — SODIUM CHLORIDE 0.9 % (FLUSH) 0.9 %
10 SYRINGE (ML) INJECTION AS NEEDED
Status: DISCONTINUED | OUTPATIENT
Start: 2023-02-02 | End: 2023-02-02 | Stop reason: HOSPADM

## 2023-02-02 RX ORDER — PROMETHAZINE HYDROCHLORIDE 25 MG/1
25 SUPPOSITORY RECTAL ONCE AS NEEDED
Status: DISCONTINUED | OUTPATIENT
Start: 2023-02-02 | End: 2023-02-02 | Stop reason: HOSPADM

## 2023-02-02 RX ORDER — ASPIRIN 81 MG/1
81 TABLET ORAL DAILY
Status: DISCONTINUED | OUTPATIENT
Start: 2023-02-02 | End: 2023-02-04 | Stop reason: HOSPADM

## 2023-02-02 RX ORDER — EPHEDRINE SULFATE 50 MG/ML
5 INJECTION, SOLUTION INTRAVENOUS ONCE AS NEEDED
Status: DISCONTINUED | OUTPATIENT
Start: 2023-02-02 | End: 2023-02-02 | Stop reason: HOSPADM

## 2023-02-02 RX ORDER — FLUMAZENIL 0.1 MG/ML
0.2 INJECTION INTRAVENOUS AS NEEDED
Status: DISCONTINUED | OUTPATIENT
Start: 2023-02-02 | End: 2023-02-02 | Stop reason: HOSPADM

## 2023-02-02 RX ORDER — FLUTICASONE PROPIONATE 50 MCG
1 SPRAY, SUSPENSION (ML) NASAL NIGHTLY
Status: DISCONTINUED | OUTPATIENT
Start: 2023-02-02 | End: 2023-02-04 | Stop reason: HOSPADM

## 2023-02-02 RX ORDER — DIPHENHYDRAMINE HCL 25 MG
25 CAPSULE ORAL
Status: DISCONTINUED | OUTPATIENT
Start: 2023-02-02 | End: 2023-02-02 | Stop reason: HOSPADM

## 2023-02-02 RX ORDER — LIDOCAINE HYDROCHLORIDE 20 MG/ML
INJECTION, SOLUTION INFILTRATION; PERINEURAL AS NEEDED
Status: DISCONTINUED | OUTPATIENT
Start: 2023-02-02 | End: 2023-02-02 | Stop reason: SURG

## 2023-02-02 RX ORDER — DIPHENHYDRAMINE HYDROCHLORIDE 50 MG/ML
12.5 INJECTION INTRAMUSCULAR; INTRAVENOUS
Status: DISCONTINUED | OUTPATIENT
Start: 2023-02-02 | End: 2023-02-02 | Stop reason: HOSPADM

## 2023-02-02 RX ORDER — ONDANSETRON 2 MG/ML
4 INJECTION INTRAMUSCULAR; INTRAVENOUS EVERY 6 HOURS PRN
Status: DISCONTINUED | OUTPATIENT
Start: 2023-02-02 | End: 2023-02-04 | Stop reason: HOSPADM

## 2023-02-02 RX ORDER — SODIUM CHLORIDE 9 MG/ML
40 INJECTION, SOLUTION INTRAVENOUS AS NEEDED
Status: DISCONTINUED | OUTPATIENT
Start: 2023-02-02 | End: 2023-02-02 | Stop reason: HOSPADM

## 2023-02-02 RX ORDER — LISINOPRIL 20 MG/1
20 TABLET ORAL
Status: DISCONTINUED | OUTPATIENT
Start: 2023-02-02 | End: 2023-02-04 | Stop reason: HOSPADM

## 2023-02-02 RX ORDER — GLYCOPYRROLATE 0.2 MG/ML
INJECTION INTRAMUSCULAR; INTRAVENOUS AS NEEDED
Status: DISCONTINUED | OUTPATIENT
Start: 2023-02-02 | End: 2023-02-02 | Stop reason: SURG

## 2023-02-02 RX ORDER — DEXAMETHASONE SODIUM PHOSPHATE 4 MG/ML
INJECTION, SOLUTION INTRA-ARTICULAR; INTRALESIONAL; INTRAMUSCULAR; INTRAVENOUS; SOFT TISSUE AS NEEDED
Status: DISCONTINUED | OUTPATIENT
Start: 2023-02-02 | End: 2023-02-02 | Stop reason: SURG

## 2023-02-02 RX ORDER — MONTELUKAST SODIUM 10 MG/1
10 TABLET ORAL NIGHTLY
Status: DISCONTINUED | OUTPATIENT
Start: 2023-02-02 | End: 2023-02-04 | Stop reason: HOSPADM

## 2023-02-02 RX ORDER — EPHEDRINE SULFATE 50 MG/ML
INJECTION INTRAVENOUS AS NEEDED
Status: DISCONTINUED | OUTPATIENT
Start: 2023-02-02 | End: 2023-02-02 | Stop reason: SURG

## 2023-02-02 RX ORDER — ONDANSETRON 4 MG/1
4 TABLET, FILM COATED ORAL EVERY 6 HOURS PRN
Status: DISCONTINUED | OUTPATIENT
Start: 2023-02-02 | End: 2023-02-04 | Stop reason: HOSPADM

## 2023-02-02 RX ORDER — HYDROCHLOROTHIAZIDE 25 MG/1
25 TABLET ORAL
Status: DISCONTINUED | OUTPATIENT
Start: 2023-02-02 | End: 2023-02-04 | Stop reason: HOSPADM

## 2023-02-02 RX ORDER — ATORVASTATIN CALCIUM 80 MG/1
80 TABLET, FILM COATED ORAL NIGHTLY
Status: DISCONTINUED | OUTPATIENT
Start: 2023-02-02 | End: 2023-02-04 | Stop reason: HOSPADM

## 2023-02-02 RX ORDER — SODIUM CHLORIDE 9 MG/ML
100 INJECTION, SOLUTION INTRAVENOUS CONTINUOUS
Status: DISCONTINUED | OUTPATIENT
Start: 2023-02-02 | End: 2023-02-03

## 2023-02-02 RX ORDER — ONDANSETRON 2 MG/ML
4 INJECTION INTRAMUSCULAR; INTRAVENOUS ONCE AS NEEDED
Status: DISCONTINUED | OUTPATIENT
Start: 2023-02-02 | End: 2023-02-02 | Stop reason: HOSPADM

## 2023-02-02 RX ORDER — FENTANYL CITRATE 50 UG/ML
INJECTION, SOLUTION INTRAMUSCULAR; INTRAVENOUS AS NEEDED
Status: DISCONTINUED | OUTPATIENT
Start: 2023-02-02 | End: 2023-02-02 | Stop reason: SURG

## 2023-02-02 RX ORDER — HEPARIN SODIUM 1000 [USP'U]/ML
INJECTION, SOLUTION INTRAVENOUS; SUBCUTANEOUS AS NEEDED
Status: DISCONTINUED | OUTPATIENT
Start: 2023-02-02 | End: 2023-02-02 | Stop reason: SURG

## 2023-02-02 RX ADMIN — Medication 200 MCG: at 08:15

## 2023-02-02 RX ADMIN — GLYCOPYRROLATE 0.6 MCG: 1 INJECTION INTRAMUSCULAR; INTRAVENOUS at 11:13

## 2023-02-02 RX ADMIN — LIDOCAINE HYDROCHLORIDE 100 MG: 20 INJECTION, SOLUTION INFILTRATION; PERINEURAL at 08:10

## 2023-02-02 RX ADMIN — APIXABAN 5 MG: 5 TABLET, FILM COATED ORAL at 23:06

## 2023-02-02 RX ADMIN — DEXAMETHASONE SODIUM PHOSPHATE 8 MG: 4 INJECTION, SOLUTION INTRAMUSCULAR; INTRAVENOUS at 08:17

## 2023-02-02 RX ADMIN — MIDAZOLAM 1 MG: 1 INJECTION INTRAMUSCULAR; INTRAVENOUS at 07:26

## 2023-02-02 RX ADMIN — HYDROCODONE BITARTRATE AND ACETAMINOPHEN 1 TABLET: 5; 325 TABLET ORAL at 16:47

## 2023-02-02 RX ADMIN — VANCOMYCIN HYDROCHLORIDE 1250 MG: 10 INJECTION, POWDER, LYOPHILIZED, FOR SOLUTION INTRAVENOUS at 07:33

## 2023-02-02 RX ADMIN — ROCURONIUM BROMIDE 40 MG: 10 INJECTION, SOLUTION INTRAVENOUS at 08:11

## 2023-02-02 RX ADMIN — ROCURONIUM BROMIDE 20 MG: 10 INJECTION, SOLUTION INTRAVENOUS at 08:41

## 2023-02-02 RX ADMIN — MONTELUKAST SODIUM 10 MG: 10 TABLET, FILM COATED ORAL at 20:57

## 2023-02-02 RX ADMIN — SODIUM CHLORIDE 100 ML/HR: 9 INJECTION, SOLUTION INTRAVENOUS at 18:17

## 2023-02-02 RX ADMIN — EPHEDRINE SULFATE 10 MG: 50 INJECTION INTRAVENOUS at 09:47

## 2023-02-02 RX ADMIN — PHENYLEPHRINE HYDROCHLORIDE 0.5 MCG/KG/MIN: 10 INJECTION INTRAVENOUS at 08:31

## 2023-02-02 RX ADMIN — PROPOFOL 150 MG: 10 INJECTION, EMULSION INTRAVENOUS at 08:11

## 2023-02-02 RX ADMIN — MIDAZOLAM 1 MG: 1 INJECTION INTRAMUSCULAR; INTRAVENOUS at 07:21

## 2023-02-02 RX ADMIN — ROCURONIUM BROMIDE 10 MG: 10 INJECTION, SOLUTION INTRAVENOUS at 10:10

## 2023-02-02 RX ADMIN — ASPIRIN 81 MG: 81 TABLET, COATED ORAL at 16:47

## 2023-02-02 RX ADMIN — FAMOTIDINE 20 MG: 10 INJECTION INTRAVENOUS at 07:15

## 2023-02-02 RX ADMIN — FENTANYL CITRATE 50 MCG: 50 INJECTION, SOLUTION INTRAMUSCULAR; INTRAVENOUS at 08:10

## 2023-02-02 RX ADMIN — Medication 200 MCG: at 11:18

## 2023-02-02 RX ADMIN — Medication 200 MCG: at 10:31

## 2023-02-02 RX ADMIN — FENTANYL CITRATE 50 MCG: 50 INJECTION, SOLUTION INTRAMUSCULAR; INTRAVENOUS at 07:21

## 2023-02-02 RX ADMIN — TERAZOSIN HYDROCHLORIDE 5 MG: 5 CAPSULE ORAL at 20:57

## 2023-02-02 RX ADMIN — ONDANSETRON 4 MG: 2 INJECTION INTRAMUSCULAR; INTRAVENOUS at 11:13

## 2023-02-02 RX ADMIN — Medication 200 MCG: at 09:11

## 2023-02-02 RX ADMIN — EPHEDRINE SULFATE 10 MG: 50 INJECTION INTRAVENOUS at 09:13

## 2023-02-02 RX ADMIN — SODIUM CHLORIDE, POTASSIUM CHLORIDE, SODIUM LACTATE AND CALCIUM CHLORIDE 9 ML/HR: 600; 310; 30; 20 INJECTION, SOLUTION INTRAVENOUS at 07:15

## 2023-02-02 RX ADMIN — HYDROCODONE BITARTRATE AND ACETAMINOPHEN 1 TABLET: 5; 325 TABLET ORAL at 21:02

## 2023-02-02 RX ADMIN — ROCURONIUM BROMIDE 20 MG: 10 INJECTION, SOLUTION INTRAVENOUS at 09:37

## 2023-02-02 RX ADMIN — ATORVASTATIN CALCIUM 80 MG: 80 TABLET, FILM COATED ORAL at 20:57

## 2023-02-02 RX ADMIN — PROPOFOL 50 MG: 10 INJECTION, EMULSION INTRAVENOUS at 11:11

## 2023-02-02 RX ADMIN — HEPARIN SODIUM 10000 UNITS: 1000 INJECTION INTRAVENOUS; SUBCUTANEOUS at 09:05

## 2023-02-02 RX ADMIN — NEOSTIGMINE METHYLSULFATE 3 MG: 0.5 INJECTION INTRAVENOUS at 11:13

## 2023-02-02 RX ADMIN — FLUTICASONE PROPIONATE 1 SPRAY: 50 SPRAY, METERED NASAL at 20:57

## 2023-02-02 RX ADMIN — ROCURONIUM BROMIDE 10 MG: 10 INJECTION, SOLUTION INTRAVENOUS at 10:41

## 2023-02-02 RX ADMIN — Medication 200 MCG: at 08:30

## 2023-02-02 NOTE — ANESTHESIA PROCEDURE NOTES
Airway  Urgency: elective    Date/Time: 2/2/2023 8:13 AM  Airway not difficult    General Information and Staff    Patient location during procedure: OR  Anesthesiologist: Miguel Heaton MD  CRNA/CAA: Leonardo Mcneil CRNA    Indications and Patient Condition  Indications for airway management: airway protection    Preoxygenated: yes  Mask difficulty assessment: 1 - vent by mask    Final Airway Details  Final airway type: endotracheal airway      Successful airway: ETT  Cuffed: yes   Successful intubation technique: direct laryngoscopy  Endotracheal tube insertion site: oral  Blade: Huerta  Blade size: 2  ETT size (mm): 8.0  Cormack-Lehane Classification: grade I - full view of glottis  Placement verified by: chest auscultation and capnometry   Measured from: lips  Number of attempts at approach: 1  Assessment: lips, teeth, and gum same as pre-op and atraumatic intubation    Additional Comments  Pre 02 100%, SIVI, DL x1, atraumatic intubation, BLBS, Positive ETC02.

## 2023-02-02 NOTE — ANESTHESIA PROCEDURE NOTES
Arterial Line      Patient reassessed immediately prior to procedure    Patient location during procedure: pre-op  Stop Time:2/2/2023 7:32 AM       Line placed for hemodynamic monitoring.  Performed By   Anesthesiologist: Miguel Heaton MD   Preanesthetic Checklist  Completed: patient identified, IV checked, site marked, risks and benefits discussed, surgical consent, monitors and equipment checked, pre-op evaluation and timeout performed  Arterial Line Prep    Sterile Tech: gloves and cap  Prep: ChloraPrep  Patient monitoring: blood pressure monitoring, continuous pulse oximetry and EKG  Arterial Line Procedure   Laterality:left  Location:  radial artery  Catheter size: 20 G   Guidance: ultrasound guided and landmark technique  Number of attempts: 1  Successful placement: yes Images: still images not obtained  Post Assessment   Dressing Type: occlusive dressing applied, secured with tape and wrist guard applied.   Complications no  Circ/Move/Sens Assessment: normal.   Patient Tolerance: patient tolerated the procedure well with no apparent complications  Additional Notes  Ultrasound guided needle acces to artery             Please advise rescheduling patient surgery with Dr. Dakota Whitehead

## 2023-02-02 NOTE — ANESTHESIA POSTPROCEDURE EVALUATION
"Patient: Yehuda Weaver    Procedure Summary     Date: 02/02/23 Room / Location: [address] MAIN OR    Anesthesia Start: 0757 Anesthesia Stop: 1147    Procedure: LEFT FEMORAL BELOW KNEE BYPASS INSITU GREATER SAPHENOUS VEIN (Left: Thigh) Diagnosis:     Surgeons: Donna Bean Jr., MD Provider: Miguel Heaton MD    Anesthesia Type: general, Sandy ASA Status: 3          Anesthesia Type: general, Sandy    Vitals  Vitals Value Taken Time   /57 02/02/23 1316   Temp 36.7 °C (98.1 °F) 02/02/23 1143   Pulse 86 02/02/23 1325   Resp 16 02/02/23 1300   SpO2 95 % 02/02/23 1325   Vitals shown include unvalidated device data.        Post Anesthesia Care and Evaluation    Patient location during evaluation: PACU  Patient participation: complete - patient participated  Level of consciousness: awake and alert  Pain management: adequate    Airway patency: patent  Anesthetic complications: No anesthetic complications    Cardiovascular status: acceptable  Respiratory status: acceptable  Hydration status: acceptable    Comments: /68   Pulse 84   Temp 36.7 °C (98.1 °F) (Oral)   Resp 16   Ht 170.2 cm (67\")   SpO2 94%   BMI 29.81 kg/m²       "

## 2023-02-02 NOTE — ANESTHESIA PREPROCEDURE EVALUATION
Anesthesia Evaluation     Patient summary reviewed and Nursing notes reviewed   no history of anesthetic complications:  NPO Solid Status: > 6 hours  NPO Liquid Status: > 6 hours           Airway   Mallampati: II  TM distance: >3 FB  Neck ROM: full  no difficulty expected and No difficulty expected  Dental - normal exam   (+) upper dentures and poor dentition    Pulmonary - normal exam    breath sounds clear to auscultation  (+) COPD,   (-) rhonchi, decreased breath sounds, wheezes, rales, stridor  Cardiovascular - normal exam    NYHA Classification: I  ECG reviewed  PT is on anticoagulation therapy  Rhythm: regular  Rate: normal    (+) hypertension, past MI , CAD, CABG, PVD, DVT,   (-) murmur, weak pulses, friction rub, systolic click, carotid bruits, JVD, peripheral edema      Neuro/Psych- negative ROS  GI/Hepatic/Renal/Endo - negative ROS     Musculoskeletal (-) negative ROS    Abdominal  - normal exam    Abdomen: soft.   Substance History - negative use     OB/GYN negative ob/gyn ROS         Other      history of cancer remission                    Anesthesia Plan    ASA 3     general and Sandy     intravenous induction     Anesthetic plan, risks, benefits, and alternatives have been provided, discussed and informed consent has been obtained with: patient.        CODE STATUS:

## 2023-02-02 NOTE — OP NOTE
Operative Note  Date of Admission:  2/2/2023  OR Date: 2/2/2023    Pre-op Diagnosis:   Recurrent occlusion left superficial femoral and popliteal artery stents with rest pain left foot    Post-op Diagnosis:   Same    Procedure:   1) left femoral to tibioperoneal trunk bypass with in situ saphenous vein; endarterectomy of tibial peroneal trunk    Surgeon: Donna Bean Jr, MD    Assistant: Casey Spence MD and KHANH Franco CSA       and they provided critical assistance during the case including suctioning, exposure, retraction, and reduction of blood loss.    Anesthesia: General    Staff:   Circulator: Bert Resendez RN; Parvin Collier RN  Scrub Person: Yovani Spence  Assistant: Nina Lund CST    Estimated Blood Loss: 450 cc    Specimens:   Order Name Source Comment Collection Info Order Time   PROTIME-INR   Collected By: Dayana Lopez RN 2/2/2023  6:07 AM   APTT   Collected By: Dayana Lopez RN 2/2/2023  6:07 AM       Complications: None apparent    Findings: Faint Doppler dorsalis pedis and peroneal signals at the level of the ankle.  Intraoperative arteriogram demonstrated kinking just proximal to the distal anastomosis which was corrected and very diminutive peroneal artery outflow.  Multiphasic Doppler signal noted in the peroneal artery at the anastomosis and just distal to the anastomosis.    Indications:  As in preop diagnosis           Procedure: The patient was placed on the table in supine position.  After satisfactory general endotracheal anesthesia achieved the patient was prepped and the umbilicus to the toe circumferentially on the left and draped in usual sterile fashion.  Using a 2 team approach a curvilinear incision was made in the left groin and the electrocautery was used to dissect through the subtendinous tissue and the femoral sheath.  The common femoral, superficial femoral, and profundofemoral arteries were all isolated.  The common femoral vein and the  de la fuente of the greater saphenous vein were dissected.  Branches to the de la fuente of the greater saphenous vein were clamped, divided, and ligated with 3-0 silk suture proximally and hemoclipped distally.  Simultaneously, an incision was made in the left lower leg below the level of the knee medially.  The electrocautery was used to dissect through the subcutaneous tissue.  The evidence vein was identified and branches to the saphenous vein were individually clamped divided and ligated proximally with 3-0 silk suture and hemoclipped distally.  The fascia was then incised and popliteal fossa entered.  The gastrocnemius and soleus muscles were retracted posteriorly.  The popliteal artery and vein complex was identified in the popliteal artery isolated.  Dissection was then carried out down into the tibial peroneal trunk because of the popliteal artery being occluded.  The origins of the peroneal and posterior tibial arteries were isolated.  The anterior tibial artery was encircled with a vessel loop.  The skin between the groin and upper thigh incision and the knee incision were connected and the saphenous vein unroofed in its entire length.  10,000 units of heparin was then given intravenously.  After 5-minute circulation time a partial occlusion clamp was placed on the common femoral vein and the greater saphenous vein was disconnected.  The femoral venotomy was closed with a running 5-0 Prolene suture.  The first valve in the greater saphenous vein was excised under direct vision.  Vascular control was obtained of the femoral arteries and a lateral arteriotomy made in the common femoral artery.  The vein was swung over to the femoral artery and an end vein to side artery anastomosis was constructed using a running 6-0 Prolene suture.  After completion of this flow was reestablished into the native system.  The saphenous vein was disconnected at the lowest end of the incision and the distal end ligated with 3-0 silk suture.   Using a valvulotome the 2 mm cutting head was first passed through the distal and followed by the 3 mm cutting head.  With strong prograde arterial flow following this.  Vascular control was obtained of the popliteal artery and the origins of the peroneal and posterior tibial arteries and a lateral arteriotomy made in the tibial peroneal trunk.  There was some laminated old thrombus and plaque noted.  An endarterectomy was performed to remove this.  The vein was then cut to the appropriate length and beveled shape and an end vein to side artery anastomosis was constructed using running 6-0 Prolene suture beginning at each end and tying on the side.  Prior to completion of this the graft was flushed and the lumen irrigated heparinized saline solution and the anastomosis completed.  There is a multiphasic Doppler signal noted in the vein graft and in the de la fuente of the anastomosis as well as in the artery distal to the anastomosis.  Only faint Doppler signals noted at the ankle and the dorsalis pedis and peroneal arteries.  All AV fistula were identified with a Doppler and double hemoclipped.  Since there did not appear to be as good of flow as expected an angiogram was performed by placing a 19-gauge butterfly needle into the vein graft, occluding the graft, and injecting contrast.  This demonstrated the vein graft to have a mild kink in the vein near the anastomosis but the peroneal being outflow into the foot but very diminutive.  It was felt that this would dilate with time since there was a multiphasic signal noted in the peroneal artery.  Hemostasis was checked and noted to be satisfactory.  No reversal was given.  Platelet rich plasma sprayed into the wound.  The vein graft was pulled back slightly intact to surrounding tissue to remove the kink.  The deep subcutaneous tissue was closed in the groin and upper thigh using a running 2-0 Vicryl suture in 2 layers.  Superficial subcutaneous tissue was closed about the  running 3-0 Vicryl suture.  Skin was closed with skin staples.  Dressings were applied.  The patient tolerated procedure satisfactorily.  He was transported to recovery room in satisfactory condition.        Radiographic Findings:  1) as described above      Active Hospital Problems    Diagnosis  POA   • **PVD (peripheral vascular disease) (HCC) [I73.9]  Yes      Resolved Hospital Problems   No resolved problems to display.      Donna Bean Jr., MD     Date: 2/2/2023  Time: 11:31 EST

## 2023-02-02 NOTE — INTERVAL H&P NOTE
History physical examination have been reviewed and updated with the following: There are palpable femoral pulses noted bilaterally and no palpable pulses noted distal to this on the left.  The left foot is pale compared to the right.  In particular, the left great toe has capillary refill which is more sluggish than the remaining toes.    Understands that bypass is needed since he has had multiple failures of his stented left superficial femoral-popliteal arteries.  The left femoral to below-knee popliteal/tibial bypass is planned.  The risk and benefits have been discussed.  He understands the need for abstinence of smoking as well.  He is in agreement with the plan.

## 2023-02-03 LAB
ACT BLD: 119 SECONDS (ref 82–152)
ACT BLD: 221 SECONDS (ref 82–152)
ACT BLD: 281 SECONDS (ref 82–152)
ANION GAP SERPL CALCULATED.3IONS-SCNC: 5.6 MMOL/L (ref 5–15)
BASOPHILS # BLD AUTO: 0.01 10*3/MM3 (ref 0–0.2)
BASOPHILS NFR BLD AUTO: 0.1 % (ref 0–1.5)
BUN SERPL-MCNC: 13 MG/DL (ref 8–23)
BUN/CREAT SERPL: 17.8 (ref 7–25)
CALCIUM SPEC-SCNC: 7.9 MG/DL (ref 8.6–10.5)
CHLORIDE SERPL-SCNC: 104 MMOL/L (ref 98–107)
CO2 SERPL-SCNC: 25.4 MMOL/L (ref 22–29)
CREAT SERPL-MCNC: 0.73 MG/DL (ref 0.76–1.27)
DEPRECATED RDW RBC AUTO: 42.1 FL (ref 37–54)
EGFRCR SERPLBLD CKD-EPI 2021: 99.7 ML/MIN/1.73
EOSINOPHIL # BLD AUTO: 0 10*3/MM3 (ref 0–0.4)
EOSINOPHIL NFR BLD AUTO: 0 % (ref 0.3–6.2)
ERYTHROCYTE [DISTWIDTH] IN BLOOD BY AUTOMATED COUNT: 13.5 % (ref 12.3–15.4)
GLUCOSE SERPL-MCNC: 192 MG/DL (ref 65–99)
HCT VFR BLD AUTO: 32.5 % (ref 37.5–51)
HGB BLD-MCNC: 10.7 G/DL (ref 13–17.7)
IMM GRANULOCYTES # BLD AUTO: 0.07 10*3/MM3 (ref 0–0.05)
IMM GRANULOCYTES NFR BLD AUTO: 0.5 % (ref 0–0.5)
LYMPHOCYTES # BLD AUTO: 1.32 10*3/MM3 (ref 0.7–3.1)
LYMPHOCYTES NFR BLD AUTO: 8.8 % (ref 19.6–45.3)
MCH RBC QN AUTO: 28.3 PG (ref 26.6–33)
MCHC RBC AUTO-ENTMCNC: 32.9 G/DL (ref 31.5–35.7)
MCV RBC AUTO: 86 FL (ref 79–97)
MONOCYTES # BLD AUTO: 1.4 10*3/MM3 (ref 0.1–0.9)
MONOCYTES NFR BLD AUTO: 9.4 % (ref 5–12)
NEUTROPHILS NFR BLD AUTO: 12.12 10*3/MM3 (ref 1.7–7)
NEUTROPHILS NFR BLD AUTO: 81.2 % (ref 42.7–76)
NRBC BLD AUTO-RTO: 0 /100 WBC (ref 0–0.2)
PLATELET # BLD AUTO: 195 10*3/MM3 (ref 140–450)
PMV BLD AUTO: 11 FL (ref 6–12)
POTASSIUM SERPL-SCNC: 4.3 MMOL/L (ref 3.5–5.2)
RBC # BLD AUTO: 3.78 10*6/MM3 (ref 4.14–5.8)
SODIUM SERPL-SCNC: 135 MMOL/L (ref 136–145)
WBC NRBC COR # BLD: 14.92 10*3/MM3 (ref 3.4–10.8)

## 2023-02-03 PROCEDURE — 85025 COMPLETE CBC W/AUTO DIFF WBC: CPT | Performed by: SURGERY

## 2023-02-03 PROCEDURE — 80048 BASIC METABOLIC PNL TOTAL CA: CPT | Performed by: SURGERY

## 2023-02-03 PROCEDURE — 25010000002 MORPHINE PER 10 MG: Performed by: SURGERY

## 2023-02-03 RX ADMIN — METOPROLOL SUCCINATE 100 MG: 100 TABLET, EXTENDED RELEASE ORAL at 08:18

## 2023-02-03 RX ADMIN — LANSOPRAZOLE 30 MG: 15 TABLET, ORALLY DISINTEGRATING, DELAYED RELEASE ORAL at 05:48

## 2023-02-03 RX ADMIN — FLUTICASONE PROPIONATE 1 SPRAY: 50 SPRAY, METERED NASAL at 20:22

## 2023-02-03 RX ADMIN — MONTELUKAST SODIUM 10 MG: 10 TABLET, FILM COATED ORAL at 20:21

## 2023-02-03 RX ADMIN — HYDROCHLOROTHIAZIDE 25 MG: 25 TABLET ORAL at 08:18

## 2023-02-03 RX ADMIN — HYDROCODONE BITARTRATE AND ACETAMINOPHEN 1 TABLET: 5; 325 TABLET ORAL at 08:19

## 2023-02-03 RX ADMIN — SODIUM CHLORIDE 100 ML/HR: 9 INJECTION, SOLUTION INTRAVENOUS at 03:00

## 2023-02-03 RX ADMIN — HYDROCODONE BITARTRATE AND ACETAMINOPHEN 1 TABLET: 5; 325 TABLET ORAL at 12:29

## 2023-02-03 RX ADMIN — HYDROCODONE BITARTRATE AND ACETAMINOPHEN 1 TABLET: 5; 325 TABLET ORAL at 17:12

## 2023-02-03 RX ADMIN — APIXABAN 5 MG: 5 TABLET, FILM COATED ORAL at 08:18

## 2023-02-03 RX ADMIN — APIXABAN 5 MG: 5 TABLET, FILM COATED ORAL at 20:21

## 2023-02-03 RX ADMIN — LISINOPRIL 20 MG: 20 TABLET ORAL at 08:18

## 2023-02-03 RX ADMIN — DILTIAZEM HYDROCHLORIDE 240 MG: 240 CAPSULE, COATED, EXTENDED RELEASE ORAL at 08:18

## 2023-02-03 RX ADMIN — HYDROCODONE BITARTRATE AND ACETAMINOPHEN 1 TABLET: 5; 325 TABLET ORAL at 23:34

## 2023-02-03 RX ADMIN — ASPIRIN 81 MG: 81 TABLET, COATED ORAL at 08:17

## 2023-02-03 RX ADMIN — TERAZOSIN HYDROCHLORIDE 5 MG: 5 CAPSULE ORAL at 20:21

## 2023-02-03 RX ADMIN — HYDROCODONE BITARTRATE AND ACETAMINOPHEN 1 TABLET: 5; 325 TABLET ORAL at 03:00

## 2023-02-03 RX ADMIN — MORPHINE SULFATE 1 MG: 2 INJECTION, SOLUTION INTRAMUSCULAR; INTRAVENOUS at 15:47

## 2023-02-03 RX ADMIN — ATORVASTATIN CALCIUM 80 MG: 80 TABLET, FILM COATED ORAL at 20:21

## 2023-02-03 RX ADMIN — CETIRIZINE HYDROCHLORIDE 10 MG: 10 TABLET ORAL at 08:18

## 2023-02-03 NOTE — PROGRESS NOTES
We will hold 1 day status post revascularization of the left lower extremity with left femoral to tibioperoneal trunk bypass with in situ saphenous vein.  His rest pain has resolved.  Having typical postoperative pain.  Urine output has been satisfactory.    Hemoglobin 10.7 with a WBC of 14.92.  Potassium 4.3, otherwise satisfactory electrolytes      Dressing mildly saturated.  Strong palpable graft  pulse at the knee.  Doppler peroneal, dorsalis pedis, posterior tibial signals.  Foot pink and warm with good capillary refill.    Patient doing satisfactorily following his revascularization.  We will discontinue IV fluids, art line, Adam catheter, increase activity.  Likely home in a day or 2.

## 2023-02-03 NOTE — CASE MANAGEMENT/SOCIAL WORK
Discharge Planning Assessment  [facility]     Patient Name: Yehuda Weaver  MRN: [id number]  Today's Date: 2/3/2023    Admit Date: 2/2/2023    Plan: Home   Discharge Needs Assessment     Row Name 02/03/23 1557       Living Environment    People in Home significant other    Current Living Arrangements home    Primary Care Provided by self    Provides Primary Care For spouse    Family Caregiver if Needed significant other;child(juan), adult    Quality of Family Relationships supportive       Transition Planning    Patient/Family Anticipates Transition to home with family    Patient/Family Anticipated Services at Transition none    Transportation Anticipated family or friend will provide       Discharge Needs Assessment    Equipment Currently Used at Home bp cuff;nebulizer    Concerns to be Addressed no discharge needs identified    Equipment Needed After Discharge rollator               Discharge Plan     Row Name 02/03/23 1558       Plan    Plan Home    Patient/Family in Agreement with Plan yes    Plan Comments Met with pt at bedside. Introduced self, explained CCP role, facesheet verified. Pt states he lives with wife and is independent with ADLs.  Uses nebulizer at home, no other DME.  No history of HH or SNF.  Requests a rollator at discharge.  Order noted and notified Jose/Maryann.  Will be delivered to pts home.  No further needs identified.  ADAN Quintero RN              Continued Care and Services - Admitted Since 2/2/2023     Durable Medical Equipment     Service Provider Request Status Selected Services Address Phone Fax Patient Preferred    [facility] Pending - No Request Sent N/A [address] [address] [phone] --                 Demographic Summary     Row Name 02/03/23 1551       General Information    Admission Type inpatient    Arrived From home    Referral Source admission list    Reason for Consult discharge planning    Preferred Language  English       Contact Information    Permission Granted to Share Info With family/designee  Nikki Pepe ([place]) [phone]               Functional Status    No documentation.                Psychosocial    No documentation.                Abuse/Neglect    No documentation.                Legal    No documentation.                Substance Abuse    No documentation.                Patient Forms    No documentation.                   Norma Quintero RN

## 2023-02-03 NOTE — PLAN OF CARE
Problem: Adult Inpatient Plan of Care  Goal: Plan of Care Review  Flowsheets (Taken 2/3/2023 2070)  Progress: improving  Plan of Care Reviewed With: patient  Outcome Evaluation: b/p up and down above 90 systolic mostly 100's to 110's tachycardic at times seems to be due to pain up to chair x1 tolerated well urine output good. pedal pulses with doppler left pt faint left dp stronger than pt but not strong foot cool but pink states feels much better doesnt hurt as much foot pumps in place. incisions intact to left leg with dressings intact old drainage noted havent had to reinforce dressing no new drainage   Goal Outcome Evaluation:  Plan of Care Reviewed With: patient        Progress: improving  Outcome Evaluation: b/p up and down above 90 systolic mostly 100's to 110's tachycardic at times seems to be due to pain up to chair x1 tolerated well urine output good. pedal pulses with doppler left pt faint left dp stronger than pt but not strong foot cool but pink states feels much better doesnt hurt as much foot pumps in place. incisions intact to left leg with dressings intact old drainage noted havent had to reinforce dressing no new drainage

## 2023-02-03 NOTE — PLAN OF CARE
Goal Outcome Evaluation:  Plan of Care Reviewed With: patient        Progress: improving  Outcome Evaluation: patient vss. pain well controlled. patient mobilizing well with walker. pulses dopplerable. possible dc home tomorrow or sunday

## 2023-02-03 NOTE — DISCHARGE PLACEMENT REQUEST
"Geneva Ernandez PEPE (67 y.o. Male)     Date of Birth   1955    Social Security Number       [address] [place] [address]    Home Phone   [phone]    MRN   [id number]       Protestant   Presbyterian    Marital Status                               Admission Date   2/2/23    Admission Type   Elective    Admitting Provider   Donna Bean Jr., MD    Attending Provider   Donna Bean Jr., MD    Department, Room/Bed   [address], [address]       Discharge Date       Discharge Disposition       Discharge Destination                               Attending Provider: Donna Bean Jr., MD    Allergies: Penicillins    Isolation: None   Infection: None   Code Status: CPR    Ht: 170.2 cm (67.01\")   Wt: 86.2 kg (190 lb)    Admission Cmt: None   Principal Problem: PVD (peripheral vascular disease) (HCC) [I73.9]                 Active Insurance as of 2/2/2023     Primary Coverage     Payor Plan Insurance Group Employer/Plan Group    MEDICARE MEDICARE A & B      Payor Plan Address Payor Plan Phone Number Payor Plan Fax Number Effective Dates    [address] [phone]  2/1/2007 - None Entered    [place] [id number]       Subscriber Name Subscriber Birth Date Member ID       GENEVA ERNANDEZ 1955 [id number]                 Emergency Contacts      (Rel.) Home Phone Work Phone Mobile Phone    YOEL ELLIS (Significant Other) [phone] -- [phone]    AISHA ERNANDEZ (Son) [phone] -- [phone]              "

## 2023-02-04 ENCOUNTER — READMISSION MANAGEMENT (OUTPATIENT)
Dept: CALL CENTER | Facility: HOSPITAL | Age: 68
End: 2023-02-04
Payer: MEDICARE

## 2023-02-04 VITALS
WEIGHT: 190 LBS | SYSTOLIC BLOOD PRESSURE: 118 MMHG | TEMPERATURE: 97.6 F | BODY MASS INDEX: 29.82 KG/M2 | RESPIRATION RATE: 16 BRPM | DIASTOLIC BLOOD PRESSURE: 68 MMHG | HEART RATE: 89 BPM | OXYGEN SATURATION: 97 % | HEIGHT: 67 IN

## 2023-02-04 RX ORDER — ACETAMINOPHEN 500 MG
1000 TABLET ORAL EVERY 8 HOURS
Qty: 100 TABLET | Refills: 2
Start: 2023-02-04 | End: 2024-02-04

## 2023-02-04 RX ORDER — OXYCODONE HYDROCHLORIDE 5 MG/1
5 TABLET ORAL EVERY 4 HOURS PRN
Qty: 15 TABLET | Refills: 0 | Status: SHIPPED | OUTPATIENT
Start: 2023-02-04

## 2023-02-04 RX ADMIN — ASPIRIN 81 MG: 81 TABLET, COATED ORAL at 08:25

## 2023-02-04 RX ADMIN — DILTIAZEM HYDROCHLORIDE 240 MG: 240 CAPSULE, COATED, EXTENDED RELEASE ORAL at 08:26

## 2023-02-04 RX ADMIN — LANSOPRAZOLE 30 MG: 15 TABLET, ORALLY DISINTEGRATING, DELAYED RELEASE ORAL at 06:40

## 2023-02-04 RX ADMIN — HYDROCODONE BITARTRATE AND ACETAMINOPHEN 1 TABLET: 5; 325 TABLET ORAL at 06:40

## 2023-02-04 RX ADMIN — LISINOPRIL 20 MG: 20 TABLET ORAL at 08:26

## 2023-02-04 RX ADMIN — METOPROLOL SUCCINATE 100 MG: 100 TABLET, EXTENDED RELEASE ORAL at 08:26

## 2023-02-04 RX ADMIN — CETIRIZINE HYDROCHLORIDE 10 MG: 10 TABLET ORAL at 08:26

## 2023-02-04 RX ADMIN — HYDROCODONE BITARTRATE AND ACETAMINOPHEN 1 TABLET: 5; 325 TABLET ORAL at 12:37

## 2023-02-04 RX ADMIN — HYDROCHLOROTHIAZIDE 25 MG: 25 TABLET ORAL at 08:26

## 2023-02-04 RX ADMIN — APIXABAN 5 MG: 5 TABLET, FILM COATED ORAL at 08:25

## 2023-02-04 NOTE — PLAN OF CARE
Goal Outcome Evaluation:  Plan of Care Reviewed With: patient        Progress: improving  Outcome Evaluation: VSS, pain treated with PRN meds. Pulses dopplerable. Plan to DC home.

## 2023-02-04 NOTE — PLAN OF CARE
Problem: Adult Inpatient Plan of Care  Goal: Plan of Care Review  Flowsheets (Taken 2/4/2023 1071)  Progress: improving  Plan of Care Reviewed With: patient  Outcome Evaluation: pedal pulses with doppler signals are stronger this shift than last pm incision intact with dressings in place old drainage noted no new drainage pain controlled ambulating to bathroom and tolerating well   Goal Outcome Evaluation:  Plan of Care Reviewed With: patient        Progress: improving  Outcome Evaluation: pedal pulses with doppler signals are stronger this shift than last pm incision intact with dressings in place old drainage noted no new drainage pain controlled ambulating to bathroom and tolerating well

## 2023-02-04 NOTE — DISCHARGE INSTRUCTIONS
Wound care: no showers for 2 days post op.  You may shower starting on 2/5/23.  After showering, pat dry your leg incisions and apply a thin layer of betadine to incisions with a cotton ball.  If it is dry, leave open to the air.  If it is draining, cover with a dry dressings.  No soaking or submerging incisions under water for 2 weeks post op.  This will begin peeling away about 5-7 days after your procedure and you may completely remove it once it begins peeling.      Pain: take tylenol over the counter (follow instructions on the bottle) and a stronger pain medication has been sent to your pharmacy for you to take as needed.     If any fever (>101.5 F), purulent drainage from wound, or redness around wound call our office at [phone].

## 2023-02-05 NOTE — OUTREACH NOTE
Prep Survey    Flowsheet Row Responses   Religion facility patient discharged from? [facility]   Is LACE score < 7 ? No   Eligibility Readm Mgmt   Discharge diagnosis left femoral to tibioperoneal trunk bypass   Does the patient have one of the following disease processes/diagnoses(primary or secondary)? General Surgery   Does the patient have Home health ordered? No   Is there a DME ordered? No   Prep survey completed? Yes          MONICA ZARAGOZA - Registered Nurse

## 2023-02-05 NOTE — DISCHARGE SUMMARY
"  Name: Yehuda Weaver ADMIT: 2023   : 1955  PCP: Koby Hernandez MD    MRN: [id number] LOS: 2 days   AGE/SEX: 67 y.o. male  ROOM: [facility] E554/1     Date of Admission: 2023  Date of Discharge:  2023    PCP: Koby Hernandez MD      DISCHARGE DIAGNOSIS  Active Hospital Problems    Diagnosis  POA   • **PVD (peripheral vascular disease) ([facility]) [I73.9]  Yes      Resolved Hospital Problems   No resolved problems to display.       SECONDARY DIAGNOSES  Past Medical History:   Diagnosis Date   • Anticoagulated    • Atherosclerosis of native arteries of extremities with intermittent claudication, bilateral legs ([facility])    • Bladder cancer ([facility])    • COPD (chronic obstructive pulmonary disease) ([facility])    • Coronary artery disease    • Diverticulitis    • DVT OF PROXIMAL LOWER LIMB 2022    RIGHT LOWER LEG   • Heart attack ([facility])    • History of COVID-19 2022   • Hx of colonic polyps    • Hyperlipidemia    • Hypertension    • Onychomycosis    • PVD (peripheral vascular disease) ([facility])    • Slow to wake up after anesthesia        CONSULTS   Consults     No orders found from 2023 to 2/3/2023.          PROCEDURES PERFORMED    Date: 23, left femoral to tibioperoneal trunk bypass    HOSPITAL COURSE  Patient is a 67 y.o. male presented to [facility] admitted postoperatively following a left femoral to tibial artery bypass on 23.  Please see the admitting history and physical for further details.  He progressed as expected post operatively and was resumed on his therapeutic anticoagulation as well as aspirin and a statin.  He was discharged home in good condition on POD 2.       VITAL SIGNS  /68 (BP Location: Left arm, Patient Position: Lying)   Pulse 89   Temp 97.6 °F (36.4 °C) (Oral)   Resp 16   Ht 170.2 cm (67.01\")   Wt 86.2 kg (190 lb)   SpO2 97%   BMI 29.75 kg/m²   Objective  CONDITION ON DISCHARGE  Stable.      DISCHARGE DISPOSITION   Home or Self " Care      DISCHARGE MEDICATIONS     Discharge Medications      New Medications      Instructions Start Date   acetaminophen 500 MG tablet  Commonly known as: TYLENOL   1,000 mg, Oral, Every 8 Hours      oxyCODONE 5 MG immediate release tablet  Commonly known as: Roxicodone   5 mg, Oral, Every 4 Hours PRN         Continue These Medications      Instructions Start Date   albuterol (2.5 MG/3ML) 0.083% nebulizer solution  Commonly known as: PROVENTIL   2.5 mg, Nebulization, Every 4 Hours PRN      ALBUTEROL IN   Inhalation, Take As Directed, RESCUE  INHALER      Aspirin Low Dose 81 MG EC tablet  Generic drug: aspirin   81 mg, Oral, Daily, HOLD PRIOR TO SURGERY PER MD INSTRUCTIONS      atorvastatin 80 MG tablet  Commonly known as: LIPITOR   80 mg, Oral, Nightly      cetirizine 10 MG tablet  Commonly known as: zyrTEC   10 mg, Oral, Daily      dilTIAZem 240 MG 24 hr capsule  Commonly known as: TIAZAC   240 mg, Daily      doxazosin 4 MG tablet  Commonly known as: CARDURA   4 mg, Oral, Nightly      Eliquis 5 MG tablet tablet  Generic drug: apixaban   5 mg, Oral, 2 Times Daily, HOLD PRIOR TO SURGERY PER MD INSTRUCTIONS      fish oil 1000 MG capsule capsule   1,000 mg, Oral, Daily With Breakfast, HOLD FOR SURGERY      fluticasone 50 MCG/ACT nasal spray  Commonly known as: FLONASE   1 spray, Nasal, Nightly      lisinopril-hydrochlorothiazide 20-25 MG per tablet  Commonly known as: PRINZIDE,ZESTORETIC   1 tablet, Oral, Daily      metoprolol succinate  MG 24 hr tablet  Commonly known as: TOPROL-XL   100 mg, Oral, Daily      montelukast 10 MG tablet  Commonly known as: SINGULAIR   10 mg, Oral, Nightly      multivitamin tablet tablet   1 tablet, Oral, Daily, HOLD FOR SURGERY      omeprazole 40 MG capsule  Commonly known as: priLOSEC   Take 1 capsule by mouth Daily.              Future Appointments   Date Time Provider Department Center   3/10/2023 10:45 AM Rodolfo Nicole MD [facility]      Follow-up Information      Koby Hernandez MD .    Specialty: Family Medicine  Contact information:  [address] [place] [address] [phone]             Donna Bean Jr., MD Follow up in 2 week(s).    Specialty: Vascular Surgery  Contact information:  [address]  [facility] [address]  [facility] [id number] [phone]                         TEST  RESULTS PENDING AT DISCHARGE       [place], Post Op [employer]    Wen Vilchis MD  Office Number [phone]    23  19:12 EST

## 2023-02-05 NOTE — CASE MANAGEMENT/SOCIAL WORK
Case Management Discharge Note      Final Note: discharged home on 2/4/23         Selected Continued Care - Discharged on 2/4/2023 Admission date: 2/2/2023 - Discharge disposition: Home or Self Care    Destination    No services have been selected for the patient.              Durable Medical Equipment     Service Provider Selected Services Address Phone Fax Patient Preferred    [facility] [address] [facility] [address] [address] [phone] --          Dialysis/Infusion    No services have been selected for the patient.              Home Medical Care    No services have been selected for the patient.              Therapy    No services have been selected for the patient.              Community Resources    No services have been selected for the patient.              Community & DME    No services have been selected for the patient.                       Final Discharge Disposition Code: 01 - home or self-care

## 2023-02-08 ENCOUNTER — READMISSION MANAGEMENT (OUTPATIENT)
Dept: CALL CENTER | Facility: HOSPITAL | Age: 68
End: 2023-02-08
Payer: MEDICARE

## 2023-02-08 NOTE — OUTREACH NOTE
General Surgery Week 1 Survey    Flowsheet Row Responses   [facility] patient discharged from? [place]   Does the patient have one of the following disease processes/diagnoses(primary or secondary)? General Surgery   Week 1 attempt successful? Yes   Call start time 1310   Call end time 1314   Discharge diagnosis left femoral to tibioperoneal trunk bypass   Meds reviewed with patient/caregiver? Yes   Is the patient having any side effects they believe may be caused by any medication additions or changes? No   Does the patient have all medications related to this admission filled (includes all antibiotics, pain medications, etc.) Yes   Is the patient taking all medications as directed (includes completed medication regime)? Yes   Does the patient have a follow up appointment scheduled with their surgeon? Yes   Has the patient kept scheduled appointments due by today? N/A   Comments Post Op 2/15/23.    Has home health visited the patient within 72 hours of discharge? N/A   Psychosocial issues? No   Notified Case Management DME  [Pt. did not receive rollator. Spoke with Alva at [facility] pt. is not in system(no order received.)]   Did the patient receive a copy of their discharge instructions? Yes   Nursing interventions Reviewed instructions with patient   What is the patient's perception of their health status since discharge? Improving   Nursing interventions Nurse provided patient education   Is the patient /caregiver able to teach back basic post-op care? Lifting as instructed by MD in discharge instructions, Drive as instructed by MD in discharge instructions, Keep incision areas clean,dry and protected   Is the patient/caregiver able to teach back signs and symptoms of incisional infection? Increased drainage or bleeding, Increased redness, swelling or pain at the incisonal site, Incisional warmth   Is the patient/caregiver able to teach back steps to recovery at home? Eat a well-balance diet, Rest and  rebuild strength, gradually increase activity   Is the patient/caregiver able to teach back the hierarchy of who to call/visit for symptoms/problems? PCP, Specialist, Home health nurse, Urgent Care, ED, 911 Yes   Week 1 call completed? Yes   Is the patient interested in additional calls from an ambulatory ?  NOTE:  applies to high risk patients requiring additional follow-up. No          Natalee MILLAN - Registered Nurse

## 2023-02-08 NOTE — OUTREACH NOTE
Case Management Call Center Follow-up    Flowsheet Row Responses   [facility] Call Center Tracking Reason? No DME   Has the Call Center Case Management Follow-up issue been resolved? No   Follow-up Comments Contacted Jose from [facility] and she is looking into this and will get back to me on the status.          Shannon Epley, RN    2/8/2023, 14:20 EST

## 2023-02-09 ENCOUNTER — NURSE TRIAGE (OUTPATIENT)
Dept: CALL CENTER | Facility: HOSPITAL | Age: 68
End: 2023-02-09
Payer: MEDICARE

## 2023-02-09 NOTE — TELEPHONE ENCOUNTER
Caller is asking how long he can expect drainage from his left lower leg incision.  He had a femoral bypass 2/2.  Caller states that he has some clear drainage around a few of the staples.  Discussed swelling and when the swelling goes down some is to be expected.  Does not see surgeon until next week.  Recommended if more than a small amount of drainage or if it starts to bleed or look infected call surgeon.  Also states that his rollator has never been delivered.  CM note reviewed with caller.  Order was sent to [facility], number for [facility] given to caller for follow up.    Reason for Disposition  • [1] Caller has NON-URGENT question AND [2] triager unable to answer question    Additional Information  • Negative: [1] Major abdominal surgical incision AND [2] wound gaping open AND [3] visible internal organs  • Negative: Sounds like a life-threatening emergency to the triager  • Negative: Patient has a Negative Pressure Wound Therapy device  • Negative: Patient is followed by a wound clinic or wound specialist for this wound  • Negative: [1] Bleeding from incision AND [2] won't stop after 10 minutes of direct pressure  • Negative: [1] Widespread rash AND [2] bright red, sunburn-like  • Negative: Severe pain in the incision  • Negative: [1] Incision gaping open AND [2] < 48 hours since wound re-opened  • Negative: [1] Incision gaping open AND [2] length of opening > 2 inches (5 cm)  • Negative: Patient sounds very sick or weak to the triager  • Negative: Sounds like a serious complication to the triager  • Negative: Fever > 100.4 F (38.0 C)  • Negative: [1] Incision looks infected (spreading redness, pain) AND [2] fever > 99.5 F (37.5 C)  • Negative: [1] Incision looks infected (spreading redness, pain) AND [2] large red area (> 2 in. or 5 cm)  • Negative: [1] Incision looks infected (spreading redness, pain) AND [2] face wound  • Negative: [1] Red streak runs from the incision AND [2] longer than 1 inch (2.5 cm)  •  "Negative: [1] Pus or bad-smelling fluid draining from incision AND [2] no fever  • Negative: [1] Post-op pain AND [2] not controlled with pain medications  • Negative: Dressing soaked with blood or body fluid (e.g., drainage)  • Negative: [1] Raised bruise and [2] size > 2 inches (5 cm) and expanding  • Negative: [1] Caller has URGENT question AND [2] triager unable to answer question  • Negative: [1] INCREASING pain in incision AND [2] > 2 days (48 hours) since surgery  • Negative: [1] Small red area or streak AND [2] no fever  • Negative: [1] Clear or blood-tinged fluid draining from wound AND [2] no fever  • Negative: [1] Incision gaping open AND [2] > 48 hours since wound re-opened AND [3] length of opening > 1/2 inch (12 mm)  • Negative: [1] Incision on face gaping open after skin glue AND [2] > 48 hours since wound re-opened AND [3] length of opening > 1/4 inch (6 mm)  • Negative: Suture or staple removal is overdue  • Negative: [1] Suture or staple came out early AND [2] caller wants wound checked    Answer Assessment - Initial Assessment Questions  1. SYMPTOM: \"What's the main symptom you're concerned about?\" (e.g., redness, pain, drainage)      drainage  2. ONSET: \"When did drainage  start?\"      A few days ago  3. SURGERY: \"What surgery was performed?\"      Femoral bypass  4. DATE of SURGERY: \"When was surgery performed?\"       2/2  5. INCISION SITE: \"Where is the incision located?\"       Left lower leg  6. REDNESS: \"Is there any redness at the incision site?\" If yes, ask: \"How wide across is the redness?\" (Inches, centimeters)       no  7. PAIN: \"Is there any pain?\" If Yes, ask: \"How bad is it?\"  (Scale 1-10; or mild, moderate, severe)      mild  8. BLEEDING: \"Is there any bleeding?\" If Yes, ask: \"How much?\" and \"Where?\"      no  9. DRAINAGE: \"Is there any drainage from the incision site?\" If yes, ask: \"What color and how much?\" (e.g., red, cloudy, pus; drops, teaspoon)      Some clear drainage around " "some of the staples  10. FEVER: \"Do you have a fever?\" If Yes, ask: \"What is your temperature, how was it measured, and when did it start?\"        no  11. OTHER SYMPTOMS: \"Do you have any other symptoms?\" (e.g., shaking chills, weakness, rash elsewhere on body)        no    Protocols used: POST-OP INCISION SYMPTOMS AND QUESTIONS-ADULT-      "

## 2023-02-10 NOTE — OUTREACH NOTE
Case Management Call Center Follow-up    Flowsheet Row Responses   [facility] Call Center Tracking Reason? No DME   Has the Call Center Case Management Follow-up issue been resolved? Yes   Follow-up Comments Jose is working on getting this for the patient.          Shannon Epley, RN    2/10/2023, 14:26 EST

## 2023-03-08 NOTE — PROGRESS NOTES
[facility]  Cardiology progress Note    Patient Name: Yehuda Weaver  : 1955    CHIEF COMPLAINT  Coronary artery disease        Subjective   Subjective     HISTORY OF PRESENT ILLNESS    Yehuda Weaver is a 67 y.o. male with coronary artery s/p CABG.  No chest pain or shortness of breath.    REVIEW OF SYSTEMS    Constitutional:    No fever, no weight loss  Skin:     No rash  Otolaryngeal:    No difficulty swallowing  Cardiovascular: See HPI.  Pulmonary:    No cough, no sputum production    Personal History     Social History:    reports that he has been smoking cigarettes. He has a 21.00 pack-year smoking history. He has never used smokeless tobacco. He reports that he does not currently use alcohol. He reports that he does not use drugs.    Home Medications:  Current Outpatient Medications on File Prior to Visit   Medication Sig   • acetaminophen (TYLENOL) 500 MG tablet Take 2 tablets by mouth Every 8 (Eight) Hours.   • albuterol (PROVENTIL) (2.5 MG/3ML) 0.083% nebulizer solution Take 2.5 mg by nebulization Every 4 (Four) Hours As Needed for Wheezing.   • ALBUTEROL IN Inhale Take As Directed. RESCUE  INHALER   • Aspirin Low Dose 81 MG EC tablet Take 1 tablet by mouth Daily. HOLD PRIOR TO SURGERY PER MD INSTRUCTIONS   • atorvastatin (LIPITOR) 80 MG tablet Take 1 tablet by mouth Every Night.   • cetirizine (zyrTEC) 10 MG tablet Take 1 tablet by mouth Daily.   • dilTIAZem (TIAZAC) 240 MG 24 hr capsule 1 capsule Daily.   • doxazosin (CARDURA) 4 MG tablet Take 1 tablet by mouth Every Night.   • Eliquis 5 MG tablet tablet Take 1 tablet by mouth 2 (Two) Times a Day. HOLD PRIOR TO SURGERY PER MD INSTRUCTIONS   • fluticasone (FLONASE) 50 MCG/ACT nasal spray 1 spray into the nostril(s) as directed by provider Every Night.   • lisinopril-hydrochlorothiazide (PRINZIDE,ZESTORETIC) 20-25 MG per tablet Take 1 tablet by mouth Daily.   • metoprolol succinate XL (TOPROL-XL) 100 MG 24 hr tablet Take 1 tablet by  mouth Daily.   • montelukast (SINGULAIR) 10 MG tablet Take 1 tablet by mouth Every Night.   • multivitamin (THERAGRAN) tablet tablet Take 1 tablet by mouth Daily. HOLD FOR SURGERY   • Omega-3 Fatty Acids (fish oil) 1000 MG capsule capsule Take 1 capsule by mouth Daily With Breakfast. HOLD FOR SURGERY   • omeprazole (priLOSEC) 40 MG capsule Take 1 capsule by mouth Daily.   • oxyCODONE (Roxicodone) 5 MG immediate release tablet Take 1 tablet by mouth Every 4 (Four) Hours As Needed for Moderate Pain.     No current facility-administered medications on file prior to visit.       Past Medical History:   Diagnosis Date   • Anticoagulated    • Atherosclerosis of native arteries of extremities with intermittent claudication, bilateral legs ([facility])    • Bladder cancer ([facility]) 2004   • COPD (chronic obstructive pulmonary disease) ([facility])    • Coronary artery disease    • Diverticulitis    • DVT OF PROXIMAL LOWER LIMB 06/2022    RIGHT LOWER LEG   • Heart attack ([facility]) 2007   • History of COVID-19 09/2022   • Hx of colonic polyps    • Hyperlipidemia    • Hypertension    • Onychomycosis    • PVD (peripheral vascular disease) ([facility])    • Slow to wake up after anesthesia        Allergies:  Allergies   Allergen Reactions   • Penicillins Unknown - Low Severity       Objective    Objective       Vitals:   Heart Rate:  [70] 70  BP: (124)/(70) 124/70  Body mass index is 29.91 kg/m².     PHYSICAL EXAM:    General Appearance:   · well developed  · well nourished  HENT:   · oropharynx moist  · lips not cyanotic  Neck:  · thyroid not enlarged  · supple  Respiratory:  · no respiratory distress  · normal breath sounds  · no rales  Cardiovascular:  · no jugular venous distention  · regular rhythm  · apical impulse normal  · S1 normal, S2 normal  · no S3, no S4   · no murmur  · no rub, no thrill  · carotid pulses normal; no bruit  · pedal pulses normal  · lower extremity edema: none    Skin:   · warm, dry  Psychiatric:  · judgement and insight  appropriate  · normal mood and affect        Result Review:  I have personally reviewed the available results from  [x]  Laboratory  [x]  EKG  [x]  Cardiology  [x]  Medications  [x]  Old records  []  Other:     Procedures     Impression/Plan:   1.  Coronary s/p CABG stable: Continue aspirin 81 mg a day.  Continue Toprol- mg a day.  Note of the chest pain.  2.  Hyperlipidemia: Continue Lipitor 80 mg a day.    Monitor lipid and hepatic profile.  3.  Peripheral vascular disease: Continue aspirin 81 mg a day.  4.  Positive nicotine use: Recently quit smoking.  5.  Peripheral vascular disease s/p PTCA/stent and peripheral vascular surgery: Continue aspirin 81 mg a day.  6.  DVT: Continue Eliquis 5 mg twice daily.        Rodolfo Nicole MD   03/10/23   11:07 EST

## 2023-03-10 ENCOUNTER — OFFICE VISIT (OUTPATIENT)
Dept: CARDIOLOGY | Facility: CLINIC | Age: 68
End: 2023-03-10
Payer: MEDICARE

## 2023-03-10 VITALS
DIASTOLIC BLOOD PRESSURE: 70 MMHG | BODY MASS INDEX: 29.98 KG/M2 | HEIGHT: 67 IN | HEART RATE: 70 BPM | SYSTOLIC BLOOD PRESSURE: 124 MMHG | WEIGHT: 191 LBS

## 2023-03-10 DIAGNOSIS — Z95.1 HX OF CABG: ICD-10-CM

## 2023-03-10 DIAGNOSIS — I25.10 CORONARY ARTERY DISEASE INVOLVING NATIVE CORONARY ARTERY OF NATIVE HEART WITHOUT ANGINA PECTORIS: Primary | ICD-10-CM

## 2023-03-10 DIAGNOSIS — Z72.0 NICOTINE USE: ICD-10-CM

## 2023-03-10 DIAGNOSIS — E78.2 HYPERLIPEMIA, MIXED: ICD-10-CM

## 2023-03-10 PROCEDURE — 99214 OFFICE O/P EST MOD 30 MIN: CPT | Performed by: SPECIALIST

## 2023-04-07 ENCOUNTER — LAB REQUISITION (OUTPATIENT)
Dept: LAB | Facility: HOSPITAL | Age: 68
End: 2023-04-07
Payer: MEDICARE

## 2023-04-07 DIAGNOSIS — T81.89XA OTHER COMPLICATIONS OF PROCEDURES, NOT ELSEWHERE CLASSIFIED, INITIAL ENCOUNTER: ICD-10-CM

## 2023-04-07 PROCEDURE — 87186 SC STD MICRODIL/AGAR DIL: CPT | Performed by: SURGERY

## 2023-04-07 PROCEDURE — 87147 CULTURE TYPE IMMUNOLOGIC: CPT | Performed by: SURGERY

## 2023-04-07 PROCEDURE — 87070 CULTURE OTHR SPECIMN AEROBIC: CPT | Performed by: SURGERY

## 2023-04-07 PROCEDURE — 87205 SMEAR GRAM STAIN: CPT | Performed by: SURGERY

## 2023-04-09 LAB
BACTERIA SPEC AEROBE CULT: ABNORMAL
GRAM STN SPEC: ABNORMAL
GRAM STN SPEC: ABNORMAL

## 2023-07-07 NOTE — H&P
History of present illness: The patient is a 68-year-old gentleman has had multiple revascularization in the lower extremities bilaterally with recurrent failure on the left and short distance claudication and rest pain.  On February 3, 2023 he underwent a left femoral to tibial peroneal trunk bypass with in situ saphenous vein and tibial peroneal trunk endarterectomy.  He had previously undergone a right superficial femoral artery angioplasty and drug-eluting stent placement.  He had recurrent symptoms and on March 11, 2022 underwent right SFA atherectomy and repeat angioplasty and drug-eluting stent placement.    Of note, following his bypass he quit smoking.    He returned in May 2023 with a graft scan and ABIs.  The left femoral-tibial bypass graft was patent but there appeared to be high-grade stenosis beyond the distal anastomosis.  ABIs were 0.6 on the right 0.37 on the left.    Currently is on aspirin Eliquis and Crestor.  His Eliquis has been held for 24 hours.    Past medical history is pertinent for the above-mentioned vascular issues.  Also history of COPD, hypertension, hyperlipidemia, coronary artery disease status post coronary artery bypass grafting    Past surgical history pertinent for three-vessel coronary artery bypass grafting in 2007 bladder cancer surgery in 2004 and his lower extremity procedures as described above    Medications: Well-documented in the medical records.    Allergies penicillin    Social history: Patient smoked in the past but quit earlier this year.  Denies alcohol use.    Family history is pertinent for coronary artery disease diabetes and cancer as well as stroke    Review of systems: 10 system review of system has been performed.  Pertinent positives as described above.  Remaining systems have been reviewed and are negative    Physical examination well-developed well-nourished gentleman no acute distress awake, alert, oriented x3  HEENT: Normocephalic and atraumatic,  extraocular movements intact, sclera nonicteric  Neck: Supple, full range motion, no JVD, no adenopathy  Heart: Normal sinus rhythm without murmur  Chest: Equal bilateral expansion and symmetrical.  Clear to auscultation, unlabored breathing  Abdomen: Soft and benign no masses palpated, no hepatosplenomegaly  Neuro: Grossly intact without focal deficit  Extremities: Palpable femoral pulses noted bilaterally no palpable pulses noted distal to this.  There is a palpable graft pulse noted at the knee on the left.        Impression:   status post revascularization of the left lower extremity with a femoral to tibial bypass with outflow stenosis.    Plan: Arteriography with plans for endovascular treatment of the outflow with angioplasty.  The procedure, risks and  benefits have been discussed in detail with the patient and he is in agreement with the plan.

## 2023-07-10 ENCOUNTER — HOSPITAL ENCOUNTER (OUTPATIENT)
Facility: HOSPITAL | Age: 68
Setting detail: HOSPITAL OUTPATIENT SURGERY
Discharge: HOME | End: 2023-07-10
Attending: SURGERY | Admitting: SURGERY
Payer: MEDICARE

## 2023-07-10 ENCOUNTER — APPOINTMENT (OUTPATIENT)
Dept: GENERAL RADIOLOGY | Facility: HOSPITAL | Age: 68
End: 2023-07-10
Payer: MEDICARE

## 2023-07-10 VITALS
OXYGEN SATURATION: 97 % | SYSTOLIC BLOOD PRESSURE: 135 MMHG | TEMPERATURE: 97.4 F | HEIGHT: 67 IN | DIASTOLIC BLOOD PRESSURE: 73 MMHG | HEART RATE: 80 BPM | BODY MASS INDEX: 30.59 KG/M2 | RESPIRATION RATE: 16 BRPM

## 2023-07-10 DIAGNOSIS — I73.9 PVD (PERIPHERAL VASCULAR DISEASE): Primary | ICD-10-CM

## 2023-07-10 LAB — GLUCOSE BLDC GLUCOMTR-MCNC: 150 MG/DL (ref 70–130)

## 2023-07-10 PROCEDURE — 25010000002 FENTANYL CITRATE (PF) 50 MCG/ML SOLUTION: Performed by: ANESTHESIOLOGY

## 2023-07-10 PROCEDURE — C1769 GUIDE WIRE: HCPCS | Performed by: SURGERY

## 2023-07-10 PROCEDURE — C1887 CATHETER, GUIDING: HCPCS | Performed by: SURGERY

## 2023-07-10 PROCEDURE — C1725 CATH, TRANSLUMIN NON-LASER: HCPCS | Performed by: SURGERY

## 2023-07-10 PROCEDURE — 25010000002 HEPARIN (PORCINE) PER 1000 UNITS: Performed by: SURGERY

## 2023-07-10 PROCEDURE — 82948 REAGENT STRIP/BLOOD GLUCOSE: CPT

## 2023-07-10 PROCEDURE — C1894 INTRO/SHEATH, NON-LASER: HCPCS | Performed by: SURGERY

## 2023-07-10 PROCEDURE — 25010000002 CEFAZOLIN IN DEXTROSE 2-4 GM/100ML-% SOLUTION: Performed by: SURGERY

## 2023-07-10 PROCEDURE — 25510000001 IOPAMIDOL PER 1 ML: Performed by: SURGERY

## 2023-07-10 PROCEDURE — C1760 CLOSURE DEV, VASC: HCPCS | Performed by: SURGERY

## 2023-07-10 RX ORDER — FENTANYL CITRATE 50 UG/ML
50 INJECTION, SOLUTION INTRAMUSCULAR; INTRAVENOUS
Status: DISCONTINUED | OUTPATIENT
Start: 2023-07-10 | End: 2023-07-10 | Stop reason: HOSPADM

## 2023-07-10 RX ORDER — SODIUM CHLORIDE, SODIUM LACTATE, POTASSIUM CHLORIDE, CALCIUM CHLORIDE 600; 310; 30; 20 MG/100ML; MG/100ML; MG/100ML; MG/100ML
9 INJECTION, SOLUTION INTRAVENOUS CONTINUOUS
Status: DISCONTINUED | OUTPATIENT
Start: 2023-07-10 | End: 2023-07-10 | Stop reason: HOSPADM

## 2023-07-10 RX ORDER — NALOXONE HCL 0.4 MG/ML
0.2 VIAL (ML) INJECTION AS NEEDED
Status: DISCONTINUED | OUTPATIENT
Start: 2023-07-10 | End: 2023-07-10 | Stop reason: HOSPADM

## 2023-07-10 RX ORDER — DROPERIDOL 2.5 MG/ML
0.62 INJECTION, SOLUTION INTRAMUSCULAR; INTRAVENOUS
Status: DISCONTINUED | OUTPATIENT
Start: 2023-07-10 | End: 2023-07-10 | Stop reason: HOSPADM

## 2023-07-10 RX ORDER — PROMETHAZINE HYDROCHLORIDE 25 MG/1
25 TABLET ORAL ONCE AS NEEDED
Status: DISCONTINUED | OUTPATIENT
Start: 2023-07-10 | End: 2023-07-10 | Stop reason: HOSPADM

## 2023-07-10 RX ORDER — HYDRALAZINE HYDROCHLORIDE 20 MG/ML
5 INJECTION INTRAMUSCULAR; INTRAVENOUS
Status: DISCONTINUED | OUTPATIENT
Start: 2023-07-10 | End: 2023-07-10 | Stop reason: HOSPADM

## 2023-07-10 RX ORDER — FLUMAZENIL 0.1 MG/ML
0.2 INJECTION INTRAVENOUS AS NEEDED
Status: DISCONTINUED | OUTPATIENT
Start: 2023-07-10 | End: 2023-07-10 | Stop reason: HOSPADM

## 2023-07-10 RX ORDER — HYDROMORPHONE HYDROCHLORIDE 1 MG/ML
0.5 INJECTION, SOLUTION INTRAMUSCULAR; INTRAVENOUS; SUBCUTANEOUS
Status: DISCONTINUED | OUTPATIENT
Start: 2023-07-10 | End: 2023-07-10 | Stop reason: HOSPADM

## 2023-07-10 RX ORDER — FAMOTIDINE 10 MG/ML
20 INJECTION, SOLUTION INTRAVENOUS ONCE
Status: COMPLETED | OUTPATIENT
Start: 2023-07-10 | End: 2023-07-10

## 2023-07-10 RX ORDER — SODIUM CHLORIDE 0.9 % (FLUSH) 0.9 %
3-10 SYRINGE (ML) INJECTION AS NEEDED
Status: DISCONTINUED | OUTPATIENT
Start: 2023-07-10 | End: 2023-07-10 | Stop reason: HOSPADM

## 2023-07-10 RX ORDER — ONDANSETRON 2 MG/ML
4 INJECTION INTRAMUSCULAR; INTRAVENOUS ONCE AS NEEDED
Status: DISCONTINUED | OUTPATIENT
Start: 2023-07-10 | End: 2023-07-10 | Stop reason: HOSPADM

## 2023-07-10 RX ORDER — EPHEDRINE SULFATE 50 MG/ML
5 INJECTION, SOLUTION INTRAVENOUS ONCE AS NEEDED
Status: DISCONTINUED | OUTPATIENT
Start: 2023-07-10 | End: 2023-07-10 | Stop reason: HOSPADM

## 2023-07-10 RX ORDER — FENTANYL CITRATE 50 UG/ML
50 INJECTION, SOLUTION INTRAMUSCULAR; INTRAVENOUS ONCE AS NEEDED
Status: COMPLETED | OUTPATIENT
Start: 2023-07-10 | End: 2023-07-10

## 2023-07-10 RX ORDER — DIPHENHYDRAMINE HYDROCHLORIDE 50 MG/ML
12.5 INJECTION INTRAMUSCULAR; INTRAVENOUS
Status: DISCONTINUED | OUTPATIENT
Start: 2023-07-10 | End: 2023-07-10 | Stop reason: HOSPADM

## 2023-07-10 RX ORDER — LIDOCAINE HYDROCHLORIDE 10 MG/ML
0.5 INJECTION, SOLUTION EPIDURAL; INFILTRATION; INTRACAUDAL; PERINEURAL ONCE AS NEEDED
Status: DISCONTINUED | OUTPATIENT
Start: 2023-07-10 | End: 2023-07-10 | Stop reason: HOSPADM

## 2023-07-10 RX ORDER — LABETALOL HYDROCHLORIDE 5 MG/ML
5 INJECTION, SOLUTION INTRAVENOUS
Status: DISCONTINUED | OUTPATIENT
Start: 2023-07-10 | End: 2023-07-10 | Stop reason: HOSPADM

## 2023-07-10 RX ORDER — PROMETHAZINE HYDROCHLORIDE 25 MG/1
25 SUPPOSITORY RECTAL ONCE AS NEEDED
Status: DISCONTINUED | OUTPATIENT
Start: 2023-07-10 | End: 2023-07-10 | Stop reason: HOSPADM

## 2023-07-10 RX ORDER — TRAMADOL HYDROCHLORIDE 50 MG/1
50 TABLET ORAL EVERY 6 HOURS PRN
Qty: 15 TABLET | Refills: 0 | Status: SHIPPED | OUTPATIENT
Start: 2023-07-10 | End: 2024-07-09

## 2023-07-10 RX ORDER — OXYCODONE AND ACETAMINOPHEN 7.5; 325 MG/1; MG/1
1 TABLET ORAL EVERY 4 HOURS PRN
Status: DISCONTINUED | OUTPATIENT
Start: 2023-07-10 | End: 2023-07-10 | Stop reason: HOSPADM

## 2023-07-10 RX ORDER — IPRATROPIUM BROMIDE AND ALBUTEROL SULFATE 2.5; .5 MG/3ML; MG/3ML
3 SOLUTION RESPIRATORY (INHALATION) ONCE AS NEEDED
Status: DISCONTINUED | OUTPATIENT
Start: 2023-07-10 | End: 2023-07-10 | Stop reason: HOSPADM

## 2023-07-10 RX ORDER — SODIUM CHLORIDE 0.9 % (FLUSH) 0.9 %
3 SYRINGE (ML) INJECTION EVERY 12 HOURS SCHEDULED
Status: DISCONTINUED | OUTPATIENT
Start: 2023-07-10 | End: 2023-07-10 | Stop reason: HOSPADM

## 2023-07-10 RX ORDER — CEFAZOLIN SODIUM 2 G/100ML
2 INJECTION, SOLUTION INTRAVENOUS ONCE
Status: COMPLETED | OUTPATIENT
Start: 2023-07-10 | End: 2023-07-10

## 2023-07-10 RX ORDER — MIDAZOLAM HYDROCHLORIDE 1 MG/ML
0.5 INJECTION INTRAMUSCULAR; INTRAVENOUS
Status: DISCONTINUED | OUTPATIENT
Start: 2023-07-10 | End: 2023-07-10 | Stop reason: HOSPADM

## 2023-07-10 RX ORDER — HYDROCODONE BITARTRATE AND ACETAMINOPHEN 7.5; 325 MG/1; MG/1
1 TABLET ORAL ONCE AS NEEDED
Status: DISCONTINUED | OUTPATIENT
Start: 2023-07-10 | End: 2023-07-10 | Stop reason: HOSPADM

## 2023-07-10 RX ADMIN — IOPAMIDOL 37 ML: 510 INJECTION, SOLUTION INTRAVASCULAR at 12:16

## 2023-07-10 RX ADMIN — SODIUM CHLORIDE, POTASSIUM CHLORIDE, SODIUM LACTATE AND CALCIUM CHLORIDE 9 ML/HR: 600; 310; 30; 20 INJECTION, SOLUTION INTRAVENOUS at 10:15

## 2023-07-10 RX ADMIN — CEFAZOLIN SODIUM 2 G: 2 INJECTION, SOLUTION INTRAVENOUS at 11:34

## 2023-07-10 RX ADMIN — FENTANYL CITRATE 50 MCG: 50 INJECTION, SOLUTION INTRAMUSCULAR; INTRAVENOUS at 10:16

## 2023-07-10 RX ADMIN — FAMOTIDINE 20 MG: 10 INJECTION INTRAVENOUS at 10:15

## 2023-07-10 NOTE — DISCHARGE INSTRUCTIONS
[facility]  Vikas Veliz, Damian, Roland Katz,Michael, Deng  [address]  [place] [id number] [phone]      Discharge Instructions for Angiogram    Go home, rest and take it easy today.      You may experience some dizziness or memory loss from the anesthesia.  This may last for the next 24 hours.  Someone should plan on staying with you for the first 24 hours for your safety.    Do not make any important legal decisions or sign any legal papers for the next 24 hours.      Eat and drink lightly today.  Start off with liquids, jello, soup, crackers or other bland foods at first. You may advance your diet tomorrow as tolerated as long as you do not experience any nausea or vomiting.    You may resume routine medications including blood thinners. However, Glucophage should be not be started for 72 hours after the dye is given.      No lifting over 10 pounds and no strenuous activity for the next 2-3 days.    Try not to strain or bear down when your bowels move or when you empty your bladder.    Limit going up and down steps for 2 days.    No driving for the remainder of the day.  You may resume limited driving tomorrow if necessary.      You may shower tomorrow.  No bath or hot tubs for at least 2 days after the procedure.          Leave the pressure dressing on until tomorrow morning.  You may then take this off, as well as the small see through dressing with gauze tomorrow.  If it doesn’t come off easily, do not pull this off.  If it is stuck, shower and let the warm water loosen it before removal.       Check your groin dressing regularly for bleeding through the dressing (under the pressure dressing).  A small amount of blood contained by the gauze is normal; the whole dressing should not be filled with blood or leaking out under the sides.     If you experience bleeding through the gauze/clear sticky dressing, lay flat and have someone apply direct pressure for 15  minutes.  If bleeding has stopped after this, you may put a clean gauze and tape over the area.  Continue to lie flat for 1-2 hours.  If bleeding continues after 15 minutes of pressure, call us at the number listed above.  There is an MD available after hours.      If you experience heavy bleeding or large swelling, continue to hold firm pressure and              call 911.  Do not call the MD on call.      If you experience pain or discomfort you may take Tylenol or Ibuprofen, whichever you normally use for minor discomfort, unless otherwise instructed.        If the MD gives you a prescription for narcotic pain pills (Tylenol #3, Vicodin, Hydrocodone, Oxycodone or Percocet), you cannot drive a vehicle or operate machinery while taking these.     Severe pain is not expected after this procedure.  If you experience severe pain, please call our office at [phone].     Remember to contact our office for any of the following:    Fever > 101 degrees  Severe pain that cannot be controlled by taking your pain pills  Severe nausea or vomiting   Significant bleeding of your incisions  Drainage that has a bad smell or is yellow or green in appearance  Any other questions or concerns

## 2023-07-10 NOTE — OP NOTE
Operative Note  Date of Admission:  7/10/2023  OR Date: 7/10/2023    Pre-op Diagnosis:   Failing left femoral to tibial bypass graft secondary to outflow stenosis    Post-op Diagnosis:     Same, along with distal graft stenosis    Procedure:   1) duplex ultrasound-guided percutaneous access of the right common femoral artery with contralateral selection of the left peroneal artery (fourth order) with left lower extremity angiogram; angioplasty of the distal vein graft and tibial peroneal trunk and peroneal arteries with 2.5 mm x 120 mm angioplasty balloon and 3 mm x 150 mm angioplasty balloon; completion angiogram    Surgeon: Donna Bean Jr, MD    Assistant:  Anabela Cooper RN    Anesthesia: General    Staff:   Circulator: Brionna Pritchard RN; Kajal Zavala RN; Rain Kelley RN  Scrub Person: Wilbert Ruiz Rita  Assistant: Anabela Cooper CST  Vascular Radiology Technician: Edwin Anderson    Estimated Blood Loss: Minimal    Specimens: None  * No orders in the log *    Complications: None apparent    Findings: Distal 3-1/2 to 4 cm of the vein graft was small and sclerotic.  The outflow tibial peroneal trunk was also very small.  The proximal portion of the anterior tibial artery and posterior tibial arteries were occluded but there was collateral flow to both of the structures from the peroneal artery.  The peroneal artery was essentially normal in its proximal two thirds.  Following angioplasty the distal vein graft, tibioperoneal trunk, and peroneal arteries were all uniform in size.    Indications:  As in preop diagnosis           Procedure: Patient placed on the table in supine position.  After satisfactory general anesthesia was achieved the patient was prepped from the umbilicus to the knees using ChloraPrep and draped in usual sterile fashion.  Using duplex ultrasound the right common femoral artery was identified and accessed percutaneously under direct vision.  Guidewire was advanced to the  upper abdominal aorta.  A rim catheter was used to select the contralateral right common iliac artery and a guidewire was then advanced down into the common femoral artery then into the vein graft, with the use of the rim catheter.  7500 units of heparin was given intravenously.  This guidewire was exchanged for a 0.035 advantage guidewire.  A 6 Romanian by 55 cm long sheath was placed over the guidewire and positioned into the common femoral artery.  A left lower extremity angiogram was performed in sequence using 7 cc of contrast at 5 cc/s.  This demonstrated a widely patent common femoral artery and profundofemoral artery.  There was flow in the vein graft but it was very sluggish because of the severe outflow stenosis.  Angiograms were performed in sequence down the leg using the same amount of contrast.  Through collaterals and the vein graft the tibial peroneal trunk reconstituted but was very small and diminutive.  The peroneal artery appeared to be the primary outflow.  A straight multi-sidehole catheter was then placed over the guidewire and advanced down into the vein graft down to the distal portion of the vein graft.  A hand-held injection of 5 cc of contrast demonstrated the distal 3-1/2 to 4 cm of the vein graft would be very small and sclerotic in the severe stenotic changes in the tibial peroneal trunk.  A 0.014 guidewire was then down into the peroneal artery.  The areas of stenosis were marked on the screen.  First a 2.5 mm x 120 mm angioplasty balloon was placed over the guidewire into the distal vein graft extending into the peroneal artery.  This was inflated to 14 yaz for 3 minutes.  An angiogram was then performed through the sheath and this demonstrated much more rapid flow through the vein graft now into the tibial arteries.  There was still some residual stenosis of about 40 to 50% in the tibial peroneal trunk.  Therefore a 3 mm x 150 mm angioplasty balloon was placed over the guidewire and  this was inflated to 14 yaz for 3 minutes.  Following this there was uniform flow noted in the vein graft into the tibial peroneal trunk and the peroneal artery.  Flow was much more brisk than before.  An angiogram was performed distally and this demonstrated the peroneal artery to be the primary outflow but there was stenotic changes noted in the distal peroneal artery and collaterals to both the anterior tibial and posterior tibial arteries with those being the primary flow into the foot.  This was a significant improvement.  Much improved Doppler signals were noted in the dorsalis pedis artery still monophasic in the posterior tibial artery.  The foot was warm and pink.  The sheath was then pulled back into the right common iliac artery.  An angiogram through the sheath demonstrated the patient was a candidate for closure device.  StarClose device was utilized with immediate hemostasis.  Additional manual pressure was held for 10 minutes.  40 mg protamine sulfate was then given intravenously.  Sponge, needle, and instrument counts were all reported as correct.  Patient was extubated transported to recovery room in satisfactory condition.        Radiographic Findings:  1) as described above      There are no hospital problems to display for this patient.     Donna Bean Jr., MD     Date: 7/10/2023  Time: 12:42 EDT

## 2023-09-07 NOTE — PROGRESS NOTES
[facility]  Cardiology progress Note    Patient Name: Yehuda Weaver  : 1955    CHIEF COMPLAINT  CAD        Subjective   Subjective     HISTORY OF PRESENT ILLNESS    Yehuda Weaver is a 68 y.o. male with CAD s/p CABG.  No chest pain or shortness of breath.    REVIEW OF SYSTEMS    Constitutional:    No fever, no weight loss  Skin:     No rash  Otolaryngeal:    No difficulty swallowing  Cardiovascular: See HPI.  Pulmonary:    No cough, no sputum production    Personal History     Social History:    reports that he quit smoking about 7 months ago. His smoking use included cigarettes. He has a 27.00 pack-year smoking history. He has never used smokeless tobacco. He reports that he does not currently use alcohol. He reports that he does not use drugs.    Home Medications:  Current Outpatient Medications on File Prior to Visit   Medication Sig    acetaminophen (TYLENOL) 500 MG tablet Take 2 tablets by mouth Every 8 (Eight) Hours.    albuterol (PROVENTIL) (2.5 MG/3ML) 0.083% nebulizer solution Take 2.5 mg by nebulization Every 4 (Four) Hours As Needed for Wheezing.    ALBUTEROL IN Inhale Take As Directed. RESCUE  INHALER    Aspirin Low Dose 81 MG EC tablet Take 1 tablet by mouth Daily. HOLD PRIOR TO SURGERY PER MD INSTRUCTIONS    atorvastatin (LIPITOR) 80 MG tablet Take 1 tablet by mouth Every Night.    cetirizine (zyrTEC) 10 MG tablet Take 1 tablet by mouth Daily.    dilTIAZem (TIAZAC) 240 MG 24 hr capsule 1 capsule Daily.    doxazosin (CARDURA) 4 MG tablet Take 1 tablet by mouth Every Night.    Eliquis 5 MG tablet tablet Take 1 tablet by mouth 2 (Two) Times a Day. HOLD PRIOR TO SURGERY PER MD INSTRUCTIONS    fluticasone (FLONASE) 50 MCG/ACT nasal spray 1 spray into the nostril(s) as directed by provider Every Night.    lisinopril-hydrochlorothiazide (PRINZIDE,ZESTORETIC) 20-25 MG per tablet Take 1 tablet by mouth Daily.    metFORMIN (GLUCOPHAGE) 500 MG tablet Take 1 tablet by mouth 2 (Two) Times a  Day With Meals.    metoprolol succinate XL (TOPROL-XL) 100 MG 24 hr tablet Take 1 tablet by mouth Daily.    montelukast (SINGULAIR) 10 MG tablet Take 1 tablet by mouth Every Night.    multivitamin (THERAGRAN) tablet tablet Take 1 tablet by mouth Daily. HOLD FOR SURGERY    Omega-3 Fatty Acids (fish oil) 1000 MG capsule capsule Take 1 capsule by mouth Daily With Breakfast. HOLD FOR SURGERY    omeprazole (priLOSEC) 40 MG capsule Take 1 capsule by mouth Daily.    traMADol (Ultram) 50 MG tablet Take 1 tablet by mouth Every 6 (Six) Hours As Needed for Moderate Pain. (Patient not taking: Reported on 9/8/2023)     No current facility-administered medications on file prior to visit.       Past Medical History:   Diagnosis Date    Anticoagulated     Atherosclerosis of native arteries of extremities with intermittent claudication, bilateral legs     Bladder cancer 2004    COPD (chronic obstructive pulmonary disease)     Coronary artery disease     Diverticulitis     DVT OF PROXIMAL LOWER LIMB 06/2022    RIGHT LOWER LEG    GERD (gastroesophageal reflux disease)     Heart attack 2007    History of COVID-19 09/2022    Hx of colonic polyps     Hyperlipidemia     Hypertension     Onychomycosis     PVD (peripheral vascular disease)     Slow to wake up after anesthesia        Allergies:  Allergies   Allergen Reactions    Penicillins GI Intolerance       Objective    Objective       Vitals:   Heart Rate:  [72] 72  BP: (115)/(61) 115/61  Body mass index is 29.76 kg/m².     PHYSICAL EXAM:    General Appearance:   well developed  well nourished  HENT:   oropharynx moist  lips not cyanotic  Neck:  thyroid not enlarged  supple  Respiratory:  no respiratory distress  normal breath sounds  no rales  Cardiovascular:  no jugular venous distention  regular rhythm  apical impulse normal  S1 normal, S2 normal  no S3, no S4   no murmur  no rub, no thrill  carotid pulses normal; no bruit  pedal pulses normal  lower extremity edema: none    Skin:    warm, dry  Psychiatric:  judgement and insight appropriate  normal mood and affect        Result Review:  I have personally reviewed the available results from  [x]  Laboratory  [x]  EKG  [x]  Cardiology  [x]  Medications  [x]  Old records  []  Other:     Procedures    Impression/Plan:   1.  Coronary s/p CABG stable: Continue aspirin 81 mg a day.  Continue Toprol- mg a day.  Note of the chest pain.  2.  Hyperlipidemia: Continue Lipitor 80 mg a day.    Monitor lipid and hepatic profile.  3.  Peripheral vascular disease s/p PTCA/stent and peripheral vascular surgery: Continue aspirin 81 mg a day.  4.  DVT: Continue Eliquis 5 mg twice daily.           Rodolfo Nicole MD   09/08/23   09:58 EDT

## 2023-09-08 ENCOUNTER — OFFICE VISIT (OUTPATIENT)
Dept: CARDIOLOGY | Facility: CLINIC | Age: 68
End: 2023-09-08
Payer: MEDICARE

## 2023-09-08 VITALS
BODY MASS INDEX: 29.82 KG/M2 | WEIGHT: 190 LBS | HEART RATE: 72 BPM | SYSTOLIC BLOOD PRESSURE: 115 MMHG | DIASTOLIC BLOOD PRESSURE: 61 MMHG | HEIGHT: 67 IN

## 2023-09-08 DIAGNOSIS — I25.10 CORONARY ARTERY DISEASE INVOLVING NATIVE CORONARY ARTERY OF NATIVE HEART WITHOUT ANGINA PECTORIS: Primary | ICD-10-CM

## 2023-09-08 DIAGNOSIS — E78.2 HYPERLIPEMIA, MIXED: ICD-10-CM

## 2023-09-08 DIAGNOSIS — Z95.1 HX OF CABG: ICD-10-CM

## 2023-09-08 PROCEDURE — 1160F RVW MEDS BY RX/DR IN RCRD: CPT | Performed by: SPECIALIST

## 2023-09-08 PROCEDURE — 1159F MED LIST DOCD IN RCRD: CPT | Performed by: SPECIALIST

## 2023-09-08 PROCEDURE — 3074F SYST BP LT 130 MM HG: CPT | Performed by: SPECIALIST

## 2023-09-08 PROCEDURE — 3078F DIAST BP <80 MM HG: CPT | Performed by: SPECIALIST

## 2023-09-08 PROCEDURE — 99214 OFFICE O/P EST MOD 30 MIN: CPT | Performed by: SPECIALIST

## 2023-12-09 ENCOUNTER — HOSPITAL ENCOUNTER (EMERGENCY)
Facility: HOSPITAL | Age: 68
Discharge: HOME | End: 2023-12-09
Attending: EMERGENCY MEDICINE
Payer: MEDICARE

## 2023-12-09 ENCOUNTER — APPOINTMENT (OUTPATIENT)
Dept: CT IMAGING | Facility: HOSPITAL | Age: 68
End: 2023-12-09
Payer: MEDICARE

## 2023-12-09 VITALS
TEMPERATURE: 98.1 F | RESPIRATION RATE: 19 BRPM | WEIGHT: 190.04 LBS | HEART RATE: 62 BPM | SYSTOLIC BLOOD PRESSURE: 138 MMHG | OXYGEN SATURATION: 94 % | DIASTOLIC BLOOD PRESSURE: 76 MMHG | HEIGHT: 67 IN | BODY MASS INDEX: 29.83 KG/M2

## 2023-12-09 DIAGNOSIS — R56.9 NEW ONSET SEIZURE: Primary | ICD-10-CM

## 2023-12-09 LAB
ALBUMIN SERPL-MCNC: 4.3 G/DL (ref 3.5–5.2)
ALBUMIN/GLOB SERPL: 1.7 G/DL
ALP SERPL-CCNC: 72 U/L (ref 39–117)
ALT SERPL W P-5'-P-CCNC: 40 U/L (ref 1–41)
ANION GAP SERPL CALCULATED.3IONS-SCNC: 15.6 MMOL/L (ref 5–15)
AST SERPL-CCNC: 26 U/L (ref 1–40)
BASOPHILS # BLD AUTO: 0.06 10*3/MM3 (ref 0–0.2)
BASOPHILS NFR BLD AUTO: 0.5 % (ref 0–1.5)
BILIRUB SERPL-MCNC: 0.2 MG/DL (ref 0–1.2)
BUN SERPL-MCNC: 15 MG/DL (ref 8–23)
BUN/CREAT SERPL: 14.9 (ref 7–25)
CALCIUM SPEC-SCNC: 9.2 MG/DL (ref 8.6–10.5)
CHLORIDE SERPL-SCNC: 95 MMOL/L (ref 98–107)
CO2 SERPL-SCNC: 21.4 MMOL/L (ref 22–29)
CREAT SERPL-MCNC: 1.01 MG/DL (ref 0.76–1.27)
DEPRECATED RDW RBC AUTO: 43.3 FL (ref 37–54)
EGFRCR SERPLBLD CKD-EPI 2021: 81 ML/MIN/1.73
EOSINOPHIL # BLD AUTO: 0.17 10*3/MM3 (ref 0–0.4)
EOSINOPHIL NFR BLD AUTO: 1.4 % (ref 0.3–6.2)
ERYTHROCYTE [DISTWIDTH] IN BLOOD BY AUTOMATED COUNT: 13.4 % (ref 12.3–15.4)
GLOBULIN UR ELPH-MCNC: 2.6 GM/DL
GLUCOSE SERPL-MCNC: 166 MG/DL (ref 65–99)
HCT VFR BLD AUTO: 41.1 % (ref 37.5–51)
HGB BLD-MCNC: 13.8 G/DL (ref 13–17.7)
HOLD SPECIMEN: NORMAL
HOLD SPECIMEN: NORMAL
IMM GRANULOCYTES # BLD AUTO: 0.04 10*3/MM3 (ref 0–0.05)
IMM GRANULOCYTES NFR BLD AUTO: 0.3 % (ref 0–0.5)
LYMPHOCYTES # BLD AUTO: 1.37 10*3/MM3 (ref 0.7–3.1)
LYMPHOCYTES NFR BLD AUTO: 11 % (ref 19.6–45.3)
MCH RBC QN AUTO: 29.4 PG (ref 26.6–33)
MCHC RBC AUTO-ENTMCNC: 33.6 G/DL (ref 31.5–35.7)
MCV RBC AUTO: 87.4 FL (ref 79–97)
MONOCYTES # BLD AUTO: 0.81 10*3/MM3 (ref 0.1–0.9)
MONOCYTES NFR BLD AUTO: 6.5 % (ref 5–12)
NEUTROPHILS NFR BLD AUTO: 10.01 10*3/MM3 (ref 1.7–7)
NEUTROPHILS NFR BLD AUTO: 80.3 % (ref 42.7–76)
NRBC BLD AUTO-RTO: 0 /100 WBC (ref 0–0.2)
PLATELET # BLD AUTO: 265 10*3/MM3 (ref 140–450)
PMV BLD AUTO: 11.2 FL (ref 6–12)
POTASSIUM SERPL-SCNC: 3.9 MMOL/L (ref 3.5–5.2)
PROT SERPL-MCNC: 6.9 G/DL (ref 6–8.5)
RBC # BLD AUTO: 4.7 10*6/MM3 (ref 4.14–5.8)
SODIUM SERPL-SCNC: 132 MMOL/L (ref 136–145)
WBC NRBC COR # BLD AUTO: 12.46 10*3/MM3 (ref 3.4–10.8)
WHOLE BLOOD HOLD COAG: NORMAL
WHOLE BLOOD HOLD SPECIMEN: NORMAL

## 2023-12-09 PROCEDURE — 80053 COMPREHEN METABOLIC PANEL: CPT | Performed by: EMERGENCY MEDICINE

## 2023-12-09 PROCEDURE — 36415 COLL VENOUS BLD VENIPUNCTURE: CPT

## 2023-12-09 PROCEDURE — 70450 CT HEAD/BRAIN W/O DYE: CPT

## 2023-12-09 PROCEDURE — 99284 EMERGENCY DEPT VISIT MOD MDM: CPT

## 2023-12-09 PROCEDURE — 85025 COMPLETE CBC W/AUTO DIFF WBC: CPT | Performed by: EMERGENCY MEDICINE

## 2023-12-09 RX ORDER — SODIUM CHLORIDE 0.9 % (FLUSH) 0.9 %
10 SYRINGE (ML) INJECTION AS NEEDED
Status: DISCONTINUED | OUTPATIENT
Start: 2023-12-09 | End: 2023-12-09 | Stop reason: HOSPADM

## 2023-12-09 NOTE — ED PROVIDER NOTES
Time: 1:42 AM EST  Date of encounter:  12/9/2023  Independent Historian/Clinical History and Information was obtained by:   Patient and Family    History is limited by: N/A    Chief Complaint: Seizure      History of Present Illness:  Patient is a 68 y.o. year old male who presents to the emergency department for evaluation of seizure    Patient states he does not remember anything that happened.  He has never had an episode like this before.  Patient's wife is at the bedside and she states that the patient was laying on the couch when she saw shaking/jerking movements of his lower extremities.  He was unconscious during this time and his jaw was clenched.  He did not fall or injure himself.  He states he was in his normal state of health prior to bed.  Patient's wife states that they have missed a considerable amount of sleep due to her recent eye surgery and the requirement to place drops in her eye every hour.    HPI    Patient Care Team  Primary Care Provider: Koby Hernandez MD    Past Medical History:     Allergies   Allergen Reactions    Penicillins GI Intolerance     Past Medical History:   Diagnosis Date    Anticoagulated     Atherosclerosis of native arteries of extremities with intermittent claudication, bilateral legs     Bladder cancer 2004    COPD (chronic obstructive pulmonary disease)     Coronary artery disease     Diverticulitis     DVT OF PROXIMAL LOWER LIMB 06/2022    RIGHT LOWER LEG    GERD (gastroesophageal reflux disease)     Heart attack 2007    History of COVID-19 09/2022    Hx of colonic polyps     Hyperlipidemia     Hypertension     Onychomycosis     PVD (peripheral vascular disease)     Slow to wake up after anesthesia      Past Surgical History:   Procedure Laterality Date    APPENDECTOMY      ARTERIOGRAM LOWER EXTREMITY Left 7/10/2023    Procedure: LEFT LOWER EXTERMITY ANGIOGRAM WITH TIBAL ARTERY ANGIOPLASTY;  Surgeon: Donna Bean Jr., MD;  Location: [facility] 18/19;   Service: Vascular;  Laterality: Left;    ATHRECTOMY ILIAC, FEMORAL, TIBIAL ARTERY Left 10/20/2022    Procedure: LEFT SUPERFICIAL FEMORAL ARTERY LASER ATHERECTOMY AND COVERED STENT, ANGIOPLASTY OF POPLITEAL AND TIBIAL PERINEAL TRUNK AND PHARMACOLOGIC THROMBOLYSIS;  Surgeon: Donna Bean Jr., MD;  Location: [facility] OR 18/19;  Service: Vascular;  Laterality: Left;    BACK SURGERY      BLADDER SURGERY  2004    COLONOSCOPY  07/22/2020    Dr. Castano    CORONARY ARTERY BYPASS GRAFT  2007    Triple coronary bypass    EKOS CATHETER PLACEMENT Right 06/16/2022    Procedure: Right lower extremity arteriogram via left groin approach with placement of thrombolysis infusion catheter;  Surgeon: Donna Bean Jr., MD;  Location: [facility] OR 18/19;  Service: Vascular;  Laterality: Right;    EKOS CATHETER REMOVAL N/A 06/17/2022    Procedure: EKOS CATHETER REMOVAL, RIGHT LOWER EXTREMITY ARTERIOGRAM WITH PERCUTANEOUS TRANSLUMINAL ANGIOPLASTY, ASPIRATION PNEUMBRA THROMBECTOMY, AND IVUS;  Surgeon: Yehuda Salgado MD;  Location: [facility] OR 18/19;  Service: Vascular;  Laterality: N/A;    FEMORAL ARTERY STENT Right 06/17/2022    FEMORAL POPLITEAL BYPASS Left 2/2/2023    Procedure: LEFT FEMORAL BELOW KNEE BYPASS INSITU GREATER SAPHENOUS VEIN;  Surgeon: Donna Bean Jr., MD;  Location: [facility];  Service: Vascular;  Laterality: Left;    HERNIA REPAIR      LEG THROMBECTOMY/EMBOLECTOMY Right 06/17/2022    UPPER GASTROINTESTINAL ENDOSCOPY  07/22/2020    Dr. Castano    VASCULAR SURGERY Right 2022     Family History   Problem Relation Age of Onset    Heart failure Mother     Clotting disorder Father     Heart attack Father     Lung cancer Brother         Unsure of age    Colon cancer Neg Hx     Malig Hyperthermia Neg Hx        Home Medications:  Prior to Admission medications    Medication Sig Start Date End Date Taking? Authorizing Provider   acetaminophen (TYLENOL) 500 MG tablet Take 2  tablets by mouth Every 8 (Eight) Hours. 2/4/23 2/4/24  Wen Vilchis MD   albuterol (PROVENTIL) (2.5 MG/3ML) 0.083% nebulizer solution Take 2.5 mg by nebulization Every 4 (Four) Hours As Needed for Wheezing.    Siria Oreilly MD   ALBUTEROL IN Inhale Take As Directed. RESCUE  INHALER    Siria Oreilly MD   Aspirin Low Dose 81 MG EC tablet Take 1 tablet by mouth Daily. HOLD PRIOR TO SURGERY PER MD INSTRUCTIONS 1/18/23   Siria Oreilly MD   atorvastatin (LIPITOR) 80 MG tablet Take 1 tablet by mouth Every Night. 7/14/21   Siria Oreilly MD   cetirizine (zyrTEC) 10 MG tablet Take 1 tablet by mouth Daily. 12/22/22   Siria Oreilly MD   dilTIAZem (TIAZAC) 240 MG 24 hr capsule 1 capsule Daily. 7/14/21   Siria Oreilly MD   doxazosin (CARDURA) 4 MG tablet Take 1 tablet by mouth Every Night. 6/9/21   Siria Oreilly MD   Eliquis 5 MG tablet tablet Take 1 tablet by mouth 2 (Two) Times a Day. HOLD PRIOR TO SURGERY PER MD INSTRUCTIONS 8/8/22   Siria Oreilly MD   fluticasone (FLONASE) 50 MCG/ACT nasal spray 1 spray into the nostril(s) as directed by provider Every Night. 9/15/21   Siria Oreilly MD   lisinopril-hydrochlorothiazide (PRINZIDE,ZESTORETIC) 20-25 MG per tablet Take 1 tablet by mouth Daily. 8/8/21   Siria Oreilly MD   metFORMIN (GLUCOPHAGE) 500 MG tablet Take 1 tablet by mouth 2 (Two) Times a Day With Meals.    Siria Oreilly MD   metoprolol succinate XL (TOPROL-XL) 100 MG 24 hr tablet Take 1 tablet by mouth Daily. 5/27/21   Siria Oreilly MD   montelukast (SINGULAIR) 10 MG tablet Take 1 tablet by mouth Every Night. 8/8/21   Siria Oreilly MD   multivitamin (THERAGRAN) tablet tablet Take 1 tablet by mouth Daily. HOLD FOR SURGERY    Siria Oreilly MD   Omega-3 Fatty Acids (fish oil) 1000 MG capsule capsule Take 1 capsule by mouth Daily With Breakfast. HOLD FOR SURGERY    Siria Oreilly MD   omeprazole  "(priLOSEC) 40 MG capsule Take 1 capsule by mouth Daily.    Provider, Historical, MD        Social History:   Social History     Tobacco Use    Smoking status: Former     Packs/day: 0.50     Years: 54.00     Additional pack years: 0.00     Total pack years: 27.00     Types: Cigarettes     Quit date: 2023     Years since quittin.8    Smokeless tobacco: Never    Tobacco comments:     23 STATES DROPPED FROM 1 PPD TO 1/2 PPD . not smoke for 3 days   Vaping Use    Vaping Use: Never used   Substance Use Topics    Alcohol use: Not Currently    Drug use: Never         Review of Systems:  Review of Systems   Constitutional:  Negative for chills and fever.   HENT:  Negative for congestion, ear pain and sore throat.    Eyes:  Negative for pain.   Respiratory:  Negative for cough, chest tightness and shortness of breath.    Cardiovascular:  Negative for chest pain.   Gastrointestinal:  Negative for abdominal pain, diarrhea, nausea and vomiting.   Genitourinary:  Negative for flank pain and hematuria.   Musculoskeletal:  Negative for joint swelling.   Skin:  Negative for pallor.   Neurological:  Positive for seizures. Negative for headaches.   All other systems reviewed and are negative.       Physical Exam:  /76   Pulse 62   Temp 98.1 °F (36.7 °C)   Resp 19   Ht 170.2 cm (67\")   Wt 86.2 kg (190 lb 0.6 oz)   SpO2 94%   BMI 29.76 kg/m²     Physical Exam  Vitals and nursing note reviewed.   Constitutional:       General: He is not in acute distress.     Appearance: Normal appearance. He is not toxic-appearing.   HENT:      Head: Normocephalic and atraumatic.      Jaw: There is normal jaw occlusion.      Mouth/Throat:      Comments: Abrasion and ecchymosis to the bilateral lateral portions of the tongue.  Eyes:      General: Lids are normal.      Extraocular Movements: Extraocular movements intact.      Conjunctiva/sclera: Conjunctivae normal.      Pupils: Pupils are equal, round, and reactive to light. "   Cardiovascular:      Rate and Rhythm: Normal rate and regular rhythm.      Pulses: Normal pulses.      Heart sounds: Normal heart sounds.   Pulmonary:      Effort: Pulmonary effort is normal. No respiratory distress.      Breath sounds: Normal breath sounds. No wheezing or rhonchi.   Abdominal:      General: Abdomen is flat.      Palpations: Abdomen is soft.      Tenderness: There is no abdominal tenderness. There is no guarding or rebound.   Musculoskeletal:         General: Normal range of motion.      Cervical back: Normal range of motion and neck supple.      Right lower leg: No edema.      Left lower leg: No edema.   Skin:     General: Skin is warm and dry.   Neurological:      Mental Status: He is alert and oriented to person, place, and time. Mental status is at baseline.   Psychiatric:         Mood and Affect: Mood normal.               Procedures:  Procedures      Medical Decision Making:      Comorbidities that affect care:    Bladder cancer, coronary artery disease, COPD, hyperlipidemia, chronic anticoagulation, history of DVT, hypertension    External Notes reviewed:    Previous Clinic Note: Outpatient cardiology visit for coronary artery disease 9/8/2023      The following orders were placed and all results were independently analyzed by me:  Orders Placed This Encounter   Procedures    CT Head Without Contrast    [place] Draw    Comprehensive Metabolic Panel    CBC Auto Differential    Ambulatory Referral to Neurology    Continuous Pulse Oximetry    CBC & Differential    Green Top (Gel)    Lavender Top    Gold Top - SST    Light Blue Top       Medications Given in the Emergency Department:  Medications - No data to display       ED Course:         Labs:    Lab Results (last 24 hours)       ** No results found for the last 24 hours. **             Imaging:    No Radiology Exams Resulted Within Past 24 Hours      Differential Diagnosis and Discussion:    Seizure: Differential diagnosis includes but is  not limited to meningitis, hypoglycemia, electrolyte abnormalities, intracranial hemorrhage, toxin induced, and pseudoseizure.    All labs were reviewed and interpreted by me.  EKG was interpreted by me.  CT scan radiology impression was interpreted by me.    MDM     Amount and/or Complexity of Data Reviewed  Decide to obtain previous medical records or to obtain history from someone other than the patient: yes                 Patient Care Considerations:    NARCOTICS: I considered prescribing opiate pain medication as an outpatient, however no pain control required.      Consultants/Shared Management Plan:    None    Social Determinants of Health:    Patient has presented with family members who are responsible, reliable and will ensure follow up care.      Disposition and Care Coordination:    Discharged: The patient is suitable and stable for discharge with no need for consideration of observation or admission.    I have explained the patient´s condition, diagnoses and treatment plan based on the information available to me at this time. I have answered questions and addressed any concerns. The patient has a good  understanding of the patient´s diagnosis, condition, and treatment plan as can be expected at this point. The vital signs have been stable. The patient´s condition is stable and appropriate for discharge from the emergency department.      The patient will pursue further outpatient evaluation with the primary care physician or other designated or consulting physician as outlined in the discharge instructions. They are agreeable to this plan of care and follow-up instructions have been explained in detail. The patient has received these instructions in written format and have expressed an understanding of the discharge instructions. The patient is aware that any significant change in condition or worsening of symptoms should prompt an immediate return to this or the closest emergency department or call  to 911.  I have explained discharge medications and the need for follow up with the patient/caretakers. This was also printed in the discharge instructions. Patient was discharged with the following medications and follow up:      Medication List      No changes were made to your prescriptions during this visit.      Khushboo Desai, APRN  [address] [place] [address]  [phone]    Schedule an appointment as soon as possible for a visit          Final diagnoses:   New onset seizure        ED Disposition       ED Disposition   Discharge    Condition   Stable    Comment   --               This medical record created using voice recognition software.             Edwin Chilel MD  12/10/23 0753

## 2023-12-09 NOTE — DISCHARGE INSTRUCTIONS
The patient is advised that the [place] state law states no driving until seizure free and compliant for 90 days or greater from the date of the last seizure.     It is my medical recommendation that the patient not place themselves in any situation wherein loss of consciousness could cause harm to themselves or others. This includes, but is not limited to, working at heights, working around flame and heat (ie cooking, campfire, welding), working with or around machinery, swimming without supervision, working with firearms and hunting equipment, and bathing without supervision.

## 2023-12-21 ENCOUNTER — TRANSCRIBE ORDERS (OUTPATIENT)
Dept: ADMINISTRATIVE | Facility: HOSPITAL | Age: 68
End: 2023-12-21
Payer: MEDICARE

## 2023-12-21 DIAGNOSIS — Z12.2 SCREENING FOR LUNG CANCER: Primary | ICD-10-CM

## 2023-12-29 ENCOUNTER — HOSPITAL ENCOUNTER (OUTPATIENT)
Dept: CT IMAGING | Facility: HOSPITAL | Age: 68
Discharge: HOME | End: 2023-12-29
Admitting: FAMILY MEDICINE
Payer: MEDICARE

## 2023-12-29 DIAGNOSIS — Z12.2 SCREENING FOR LUNG CANCER: ICD-10-CM

## 2023-12-29 PROCEDURE — 71271 CT THORAX LUNG CANCER SCR C-: CPT

## 2024-03-06 NOTE — PROGRESS NOTES
[facility]  Cardiology progress Note    Patient Name: Yehuda Weaver  : 1955    CHIEF COMPLAINT  CAD        Subjective   Subjective     HISTORY OF PRESENT ILLNESS    Yehuda Weaver is a 68 y.o. male with CAD status post CABG.  No chest pain or shortness of breath.    REVIEW OF SYSTEMS    Constitutional:    No fever, no weight loss  Skin:     No rash  Otolaryngeal:    No difficulty swallowing  Cardiovascular: See HPI.  Pulmonary:    No cough, no sputum production    Personal History     Social History:    reports that he quit smoking about 13 months ago. His smoking use included cigarettes. He started smoking about 55 years ago. He has a 27 pack-year smoking history. He has never used smokeless tobacco. He reports that he does not currently use alcohol. He reports that he does not use drugs.    Home Medications:  Current Outpatient Medications on File Prior to Visit   Medication Sig    albuterol (PROVENTIL) (2.5 MG/3ML) 0.083% nebulizer solution Take 2.5 mg by nebulization Every 4 (Four) Hours As Needed for Wheezing.    ALBUTEROL IN Inhale Take As Directed. RESCUE  INHALER    Aspirin Low Dose 81 MG EC tablet Take 1 tablet by mouth Daily. HOLD PRIOR TO SURGERY PER MD INSTRUCTIONS    atorvastatin (LIPITOR) 80 MG tablet Take 1 tablet by mouth Every Night.    cetirizine (zyrTEC) 10 MG tablet Take 1 tablet by mouth Daily.    dilTIAZem (TIAZAC) 240 MG 24 hr capsule 1 capsule Daily.    doxazosin (CARDURA) 4 MG tablet Take 1 tablet by mouth Every Night.    Eliquis 5 MG tablet tablet Take 1 tablet by mouth 2 (Two) Times a Day. HOLD PRIOR TO SURGERY PER MD INSTRUCTIONS    FeroSul 325 (65 Fe) MG tablet Take 1 tablet by mouth Daily.    fluticasone (FLONASE) 50 MCG/ACT nasal spray 1 spray into the nostril(s) as directed by provider Every Night.    Jardiance 10 MG tablet tablet Take 1 tablet by mouth Daily.    lisinopril-hydrochlorothiazide (PRINZIDE,ZESTORETIC) 20-25 MG per tablet Take 1 tablet by mouth  Daily.    metFORMIN (GLUCOPHAGE) 500 MG tablet Take 1 tablet by mouth 2 (Two) Times a Day With Meals.    metoprolol succinate XL (TOPROL-XL) 100 MG 24 hr tablet Take 1 tablet by mouth Daily.    montelukast (SINGULAIR) 10 MG tablet Take 1 tablet by mouth Every Night.    multivitamin (THERAGRAN) tablet tablet Take 1 tablet by mouth Daily. HOLD FOR SURGERY    omeprazole (priLOSEC) 40 MG capsule Take 1 capsule by mouth Daily.    [DISCONTINUED] Omega-3 Fatty Acids (fish oil) 1000 MG capsule capsule Take 1 capsule by mouth Daily With Breakfast. HOLD FOR SURGERY     No current facility-administered medications on file prior to visit.       Past Medical History:   Diagnosis Date    Anticoagulated     Atherosclerosis of native arteries of extremities with intermittent claudication, bilateral legs     Bladder cancer 2004    COPD (chronic obstructive pulmonary disease)     Coronary artery disease     Diverticulitis     DVT OF PROXIMAL LOWER LIMB 06/2022    RIGHT LOWER LEG    GERD (gastroesophageal reflux disease)     Heart attack 2007    History of COVID-19 09/2022    Hx of colonic polyps     Hyperlipidemia     Hypertension     Onychomycosis     PVD (peripheral vascular disease)     Slow to wake up after anesthesia        Allergies:  Allergies   Allergen Reactions    Penicillins GI Intolerance       Objective    Objective       Vitals:   Heart Rate:  [71] 71  BP: (125)/(62) 125/62  Body mass index is 32.6 kg/m².     PHYSICAL EXAM:    General Appearance:   well developed  well nourished  HENT:   oropharynx moist  lips not cyanotic  Neck:  thyroid not enlarged  supple  Respiratory:  no respiratory distress  normal breath sounds  no rales  Cardiovascular:  no jugular venous distention  regular rhythm  apical impulse normal  S1 normal, S2 normal  no S3, no S4   no murmur  no rub, no thrill  carotid pulses normal; no bruit  pedal pulses normal  lower extremity edema: none    Skin:   warm, dry  Psychiatric:  judgement and insight  appropriate  normal mood and affect        Result Review:  I have personally reviewed the available results from  [x]  Laboratory  [x]  EKG  [x]  Cardiology  [x]  Medications  [x]  Old records  []  Other:     Procedures       Impression/Plan:  1.  Mixed hyperlipidemia: Continue Lipitor 80 mg once a day.  Monitor lipid and hepatic profile.  2.  Coronary disease status post CABG stable: Continue aspirin 81 mg once a day.  Continue Toprol- mg once a day.  No chest pain.  3.  Peripheral vascular disease stable: Continue aspirin 81 mg once a day.  4.  Chronic DVT: Continue Eliquis 5 mg twice a day.           Rodolfo Nicole MD   03/08/24   10:55 EST

## 2024-03-08 ENCOUNTER — OFFICE VISIT (OUTPATIENT)
Dept: CARDIOLOGY | Facility: CLINIC | Age: 69
End: 2024-03-08
Payer: MEDICARE

## 2024-03-08 VITALS
BODY MASS INDEX: 32.47 KG/M2 | SYSTOLIC BLOOD PRESSURE: 125 MMHG | WEIGHT: 202 LBS | HEIGHT: 66 IN | HEART RATE: 71 BPM | DIASTOLIC BLOOD PRESSURE: 62 MMHG

## 2024-03-08 DIAGNOSIS — Z95.1 HX OF CABG: ICD-10-CM

## 2024-03-08 DIAGNOSIS — E78.2 HYPERLIPEMIA, MIXED: ICD-10-CM

## 2024-03-08 DIAGNOSIS — I25.10 CORONARY ARTERY DISEASE INVOLVING NATIVE CORONARY ARTERY OF NATIVE HEART WITHOUT ANGINA PECTORIS: Primary | ICD-10-CM

## 2024-03-08 DIAGNOSIS — I73.9 PVD (PERIPHERAL VASCULAR DISEASE): ICD-10-CM

## 2024-03-08 PROCEDURE — 1160F RVW MEDS BY RX/DR IN RCRD: CPT | Performed by: SPECIALIST

## 2024-03-08 PROCEDURE — 1159F MED LIST DOCD IN RCRD: CPT | Performed by: SPECIALIST

## 2024-03-08 PROCEDURE — 99214 OFFICE O/P EST MOD 30 MIN: CPT | Performed by: SPECIALIST

## 2024-03-08 PROCEDURE — 3078F DIAST BP <80 MM HG: CPT | Performed by: SPECIALIST

## 2024-03-08 PROCEDURE — 3074F SYST BP LT 130 MM HG: CPT | Performed by: SPECIALIST

## 2024-03-08 RX ORDER — FERROUS SULFATE 325(65) MG
1 TABLET ORAL DAILY
COMMUNITY
Start: 2024-02-07

## 2024-03-08 RX ORDER — EMPAGLIFLOZIN 10 MG/1
1 TABLET, FILM COATED ORAL DAILY
COMMUNITY
Start: 2023-12-21

## 2024-04-21 ENCOUNTER — APPOINTMENT (OUTPATIENT)
Dept: MRI IMAGING | Facility: HOSPITAL | Age: 69
End: 2024-04-21
Payer: MEDICARE

## 2024-04-21 ENCOUNTER — APPOINTMENT (OUTPATIENT)
Dept: CT IMAGING | Facility: HOSPITAL | Age: 69
End: 2024-04-21
Payer: MEDICARE

## 2024-04-21 ENCOUNTER — HOSPITAL ENCOUNTER (INPATIENT)
Facility: HOSPITAL | Age: 69
LOS: 2 days | Discharge: HOME | End: 2024-04-23
Attending: EMERGENCY MEDICINE | Admitting: INTERNAL MEDICINE
Payer: MEDICARE

## 2024-04-21 ENCOUNTER — APPOINTMENT (OUTPATIENT)
Dept: GENERAL RADIOLOGY | Facility: HOSPITAL | Age: 69
End: 2024-04-21
Payer: MEDICARE

## 2024-04-21 DIAGNOSIS — R13.12 OROPHARYNGEAL DYSPHAGIA: ICD-10-CM

## 2024-04-21 DIAGNOSIS — G47.33 OSA (OBSTRUCTIVE SLEEP APNEA): ICD-10-CM

## 2024-04-21 DIAGNOSIS — E87.20 LACTIC ACIDOSIS: ICD-10-CM

## 2024-04-21 DIAGNOSIS — E87.20 METABOLIC ACIDOSIS: ICD-10-CM

## 2024-04-21 DIAGNOSIS — R31.29 MICROSCOPIC HEMATURIA: ICD-10-CM

## 2024-04-21 DIAGNOSIS — R56.9 SEIZURE: Primary | ICD-10-CM

## 2024-04-21 LAB
ALBUMIN SERPL-MCNC: 3.8 G/DL (ref 3.5–5.2)
ALBUMIN SERPL-MCNC: 4.3 G/DL (ref 3.5–5.2)
ALBUMIN/GLOB SERPL: 1.5 G/DL
ALBUMIN/GLOB SERPL: 1.8 G/DL
ALP SERPL-CCNC: 53 U/L (ref 39–117)
ALP SERPL-CCNC: 63 U/L (ref 39–117)
ALT SERPL W P-5'-P-CCNC: 39 U/L (ref 1–41)
ALT SERPL W P-5'-P-CCNC: 45 U/L (ref 1–41)
AMPHET+METHAMPHET UR QL: NEGATIVE
ANION GAP SERPL CALCULATED.3IONS-SCNC: 11.9 MMOL/L (ref 5–15)
ANION GAP SERPL CALCULATED.3IONS-SCNC: 29 MMOL/L (ref 5–15)
ARTERIAL PATENCY WRIST A: POSITIVE
ARTERIAL PATENCY WRIST A: POSITIVE
AST SERPL-CCNC: 27 U/L (ref 1–40)
AST SERPL-CCNC: 34 U/L (ref 1–40)
BACTERIA UR QL AUTO: ABNORMAL /HPF
BARBITURATES UR QL SCN: NEGATIVE
BASE EXCESS BLDA CALC-SCNC: -17.5 MMOL/L (ref -2–2)
BASE EXCESS BLDA CALC-SCNC: -6.6 MMOL/L (ref -2–2)
BASOPHILS # BLD AUTO: 0.02 10*3/MM3 (ref 0–0.2)
BASOPHILS # BLD AUTO: 0.07 10*3/MM3 (ref 0–0.2)
BASOPHILS NFR BLD AUTO: 0.2 % (ref 0–1.5)
BASOPHILS NFR BLD AUTO: 0.6 % (ref 0–1.5)
BDY SITE: ABNORMAL
BDY SITE: ABNORMAL
BENZODIAZ UR QL SCN: NEGATIVE
BILIRUB SERPL-MCNC: 0.2 MG/DL (ref 0–1.2)
BILIRUB SERPL-MCNC: <0.2 MG/DL (ref 0–1.2)
BILIRUB UR QL STRIP: NEGATIVE
BUN SERPL-MCNC: 13 MG/DL (ref 8–23)
BUN SERPL-MCNC: 14 MG/DL (ref 8–23)
BUN/CREAT SERPL: 10.1 (ref 7–25)
BUN/CREAT SERPL: 14.1 (ref 7–25)
CA-I BLDA-SCNC: 1.16 MMOL/L (ref 1.13–1.32)
CA-I BLDA-SCNC: 1.16 MMOL/L (ref 1.13–1.32)
CALCIUM SPEC-SCNC: 8.3 MG/DL (ref 8.6–10.5)
CALCIUM SPEC-SCNC: 8.8 MG/DL (ref 8.6–10.5)
CANNABINOIDS SERPL QL: NEGATIVE
CHLORIDE BLDA-SCNC: 100 MMOL/L (ref 98–106)
CHLORIDE BLDA-SCNC: 98 MMOL/L (ref 98–106)
CHLORIDE SERPL-SCNC: 94 MMOL/L (ref 98–107)
CHLORIDE SERPL-SCNC: 99 MMOL/L (ref 98–107)
CK SERPL-CCNC: 167 U/L (ref 20–200)
CLARITY UR: CLEAR
CO2 SERPL-SCNC: 14 MMOL/L (ref 22–29)
CO2 SERPL-SCNC: 24.1 MMOL/L (ref 22–29)
COCAINE UR QL: NEGATIVE
COHGB MFR BLD: 0.2 % (ref 0–1.5)
COHGB MFR BLD: 0.6 % (ref 0–1.5)
COLOR UR: YELLOW
CREAT SERPL-MCNC: 0.99 MG/DL (ref 0.76–1.27)
CREAT SERPL-MCNC: 1.29 MG/DL (ref 0.76–1.27)
D-LACTATE SERPL-SCNC: 1.6 MMOL/L (ref 0.5–2)
D-LACTATE SERPL-SCNC: 2.4 MMOL/L (ref 0.5–2)
D-LACTATE SERPL-SCNC: 2.5 MMOL/L (ref 0.5–2)
D-LACTATE SERPL-SCNC: 8.4 MMOL/L (ref 0.5–2)
DEPRECATED RDW RBC AUTO: 48.7 FL (ref 37–54)
DEPRECATED RDW RBC AUTO: 50.6 FL (ref 37–54)
EGFRCR SERPLBLD CKD-EPI 2021: 60.4 ML/MIN/1.73
EGFRCR SERPLBLD CKD-EPI 2021: 83 ML/MIN/1.73
EOSINOPHIL # BLD AUTO: 0.02 10*3/MM3 (ref 0–0.4)
EOSINOPHIL # BLD AUTO: 0.36 10*3/MM3 (ref 0–0.4)
EOSINOPHIL NFR BLD AUTO: 0.2 % (ref 0.3–6.2)
EOSINOPHIL NFR BLD AUTO: 3 % (ref 0.3–6.2)
ERYTHROCYTE [DISTWIDTH] IN BLOOD BY AUTOMATED COUNT: 14.5 % (ref 12.3–15.4)
ERYTHROCYTE [DISTWIDTH] IN BLOOD BY AUTOMATED COUNT: 14.6 % (ref 12.3–15.4)
ETHANOL BLD-MCNC: <10 MG/DL (ref 0–10)
ETHANOL UR QL: <0.01 %
FENTANYL UR-MCNC: NEGATIVE NG/ML
FHHB: 10.3 % (ref 0–5)
FHHB: 7.2 % (ref 0–5)
GAS FLOW AIRWAY: 4 LPM
GAS FLOW AIRWAY: 5 LPM
GLOBULIN UR ELPH-MCNC: 2.1 GM/DL
GLOBULIN UR ELPH-MCNC: 2.9 GM/DL
GLUCOSE BLDA-MCNC: 193 MG/DL (ref 70–99)
GLUCOSE BLDA-MCNC: 250 MG/DL (ref 70–99)
GLUCOSE BLDC GLUCOMTR-MCNC: 106 MG/DL (ref 70–99)
GLUCOSE BLDC GLUCOMTR-MCNC: 193 MG/DL (ref 70–99)
GLUCOSE BLDC GLUCOMTR-MCNC: 64 MG/DL (ref 70–99)
GLUCOSE BLDC GLUCOMTR-MCNC: 82 MG/DL (ref 70–99)
GLUCOSE SERPL-MCNC: 253 MG/DL (ref 65–99)
GLUCOSE SERPL-MCNC: 95 MG/DL (ref 65–99)
GLUCOSE UR STRIP-MCNC: ABNORMAL MG/DL
HCO3 BLDA-SCNC: 11.7 MMOL/L (ref 22–26)
HCO3 BLDA-SCNC: 19.6 MMOL/L (ref 22–26)
HCT VFR BLD AUTO: 39.2 % (ref 37.5–51)
HCT VFR BLD AUTO: 44.3 % (ref 37.5–51)
HGB BLD-MCNC: 12.8 G/DL (ref 13–17.7)
HGB BLD-MCNC: 13.7 G/DL (ref 13–17.7)
HGB BLDA-MCNC: 14.2 G/DL (ref 13.8–16.4)
HGB BLDA-MCNC: 14.6 G/DL (ref 13.8–16.4)
HGB UR QL STRIP.AUTO: ABNORMAL
HOLD SPECIMEN: NORMAL
HOLD SPECIMEN: NORMAL
HYALINE CASTS UR QL AUTO: ABNORMAL /LPF
IMM GRANULOCYTES # BLD AUTO: 0.06 10*3/MM3 (ref 0–0.05)
IMM GRANULOCYTES # BLD AUTO: 0.16 10*3/MM3 (ref 0–0.05)
IMM GRANULOCYTES NFR BLD AUTO: 0.5 % (ref 0–0.5)
IMM GRANULOCYTES NFR BLD AUTO: 1.3 % (ref 0–0.5)
INHALED O2 CONCENTRATION: 36 %
INHALED O2 CONCENTRATION: 40 %
KETONES UR QL STRIP: NEGATIVE
L PNEUMO1 AG UR QL IA: NEGATIVE
LACTATE BLDA-SCNC: 17.34 MMOL/L (ref 0.5–2)
LACTATE BLDA-SCNC: 5.07 MMOL/L (ref 0.5–2)
LEUKOCYTE ESTERASE UR QL STRIP.AUTO: ABNORMAL
LYMPHOCYTES # BLD AUTO: 1.47 10*3/MM3 (ref 0.7–3.1)
LYMPHOCYTES # BLD AUTO: 4.37 10*3/MM3 (ref 0.7–3.1)
LYMPHOCYTES NFR BLD AUTO: 11.9 % (ref 19.6–45.3)
LYMPHOCYTES NFR BLD AUTO: 35.9 % (ref 19.6–45.3)
MAGNESIUM SERPL-MCNC: 2.1 MG/DL (ref 1.6–2.4)
MAGNESIUM SERPL-MCNC: 2.1 MG/DL (ref 1.6–2.4)
MCH RBC QN AUTO: 29.3 PG (ref 26.6–33)
MCH RBC QN AUTO: 29.6 PG (ref 26.6–33)
MCHC RBC AUTO-ENTMCNC: 30.9 G/DL (ref 31.5–35.7)
MCHC RBC AUTO-ENTMCNC: 32.7 G/DL (ref 31.5–35.7)
MCV RBC AUTO: 90.5 FL (ref 79–97)
MCV RBC AUTO: 94.7 FL (ref 79–97)
METHADONE UR QL SCN: NEGATIVE
METHGB BLD QL: 0.2 % (ref 0–1.5)
METHGB BLD QL: 0.3 % (ref 0–1.5)
MODALITY: ABNORMAL
MODALITY: ABNORMAL
MONOCYTES # BLD AUTO: 1.01 10*3/MM3 (ref 0.1–0.9)
MONOCYTES # BLD AUTO: 1.15 10*3/MM3 (ref 0.1–0.9)
MONOCYTES NFR BLD AUTO: 8.2 % (ref 5–12)
MONOCYTES NFR BLD AUTO: 9.4 % (ref 5–12)
NEUTROPHILS NFR BLD AUTO: 50.6 % (ref 42.7–76)
NEUTROPHILS NFR BLD AUTO: 6.17 10*3/MM3 (ref 1.7–7)
NEUTROPHILS NFR BLD AUTO: 78.2 % (ref 42.7–76)
NEUTROPHILS NFR BLD AUTO: 9.64 10*3/MM3 (ref 1.7–7)
NITRITE UR QL STRIP: NEGATIVE
NOTE: ABNORMAL
NRBC BLD AUTO-RTO: 0 /100 WBC (ref 0–0.2)
NRBC BLD AUTO-RTO: 0 /100 WBC (ref 0–0.2)
NT-PROBNP SERPL-MCNC: 447.7 PG/ML (ref 0–900)
OPIATES UR QL: NEGATIVE
OXYCODONE UR QL SCN: NEGATIVE
OXYHGB MFR BLDV: 88.9 % (ref 94–99)
OXYHGB MFR BLDV: 92.3 % (ref 94–99)
PCO2 BLDA: 39.6 MM HG (ref 35–45)
PCO2 BLDA: 41.9 MM HG (ref 35–45)
PH BLDA: 7.09 PH UNITS (ref 7.35–7.45)
PH BLDA: 7.29 PH UNITS (ref 7.35–7.45)
PH UR STRIP.AUTO: 7 [PH] (ref 5–8)
PHOSPHATE SERPL-MCNC: 2 MG/DL (ref 2.5–4.5)
PLATELET # BLD AUTO: 214 10*3/MM3 (ref 140–450)
PLATELET # BLD AUTO: 281 10*3/MM3 (ref 140–450)
PMV BLD AUTO: 10.8 FL (ref 6–12)
PMV BLD AUTO: 11.2 FL (ref 6–12)
PO2 BLD: 185 MM[HG] (ref 0–500)
PO2 BLD: 231 MM[HG] (ref 0–500)
PO2 BLDA: 66.7 MM HG (ref 80–100)
PO2 BLDA: 92.5 MM HG (ref 80–100)
POTASSIUM BLDA-SCNC: 4.34 MMOL/L (ref 3.5–5)
POTASSIUM BLDA-SCNC: 4.36 MMOL/L (ref 3.5–5)
POTASSIUM SERPL-SCNC: 4 MMOL/L (ref 3.5–5.2)
POTASSIUM SERPL-SCNC: 4.3 MMOL/L (ref 3.5–5.2)
PROCALCITONIN SERPL-MCNC: 0.06 NG/ML (ref 0–0.25)
PROT SERPL-MCNC: 5.9 G/DL (ref 6–8.5)
PROT SERPL-MCNC: 7.2 G/DL (ref 6–8.5)
PROT UR QL STRIP: ABNORMAL
RBC # BLD AUTO: 4.33 10*6/MM3 (ref 4.14–5.8)
RBC # BLD AUTO: 4.68 10*6/MM3 (ref 4.14–5.8)
RBC # UR STRIP: ABNORMAL /HPF
REF LAB TEST METHOD: ABNORMAL
S PNEUM AG SPEC QL LA: NEGATIVE
SAO2 % BLDCOA: 89.6 % (ref 95–99)
SAO2 % BLDCOA: 92.8 % (ref 95–99)
SODIUM BLDA-SCNC: 135.2 MMOL/L (ref 136–146)
SODIUM BLDA-SCNC: 137.9 MMOL/L (ref 136–146)
SODIUM SERPL-SCNC: 135 MMOL/L (ref 136–145)
SODIUM SERPL-SCNC: 137 MMOL/L (ref 136–145)
SP GR UR STRIP: 1.02 (ref 1–1.03)
SQUAMOUS #/AREA URNS HPF: ABNORMAL /HPF
TROPONIN T SERPL HS-MCNC: 25 NG/L
UROBILINOGEN UR QL STRIP: ABNORMAL
WBC # UR STRIP: ABNORMAL /HPF
WBC NRBC COR # BLD AUTO: 12.18 10*3/MM3 (ref 3.4–10.8)
WBC NRBC COR # BLD AUTO: 12.32 10*3/MM3 (ref 3.4–10.8)
WHOLE BLOOD HOLD COAG: NORMAL
WHOLE BLOOD HOLD SPECIMEN: NORMAL

## 2024-04-21 PROCEDURE — 82607 VITAMIN B-12: CPT | Performed by: PSYCHIATRY & NEUROLOGY

## 2024-04-21 PROCEDURE — 82550 ASSAY OF CK (CPK): CPT | Performed by: EMERGENCY MEDICINE

## 2024-04-21 PROCEDURE — 80307 DRUG TEST PRSMV CHEM ANLYZR: CPT | Performed by: EMERGENCY MEDICINE

## 2024-04-21 PROCEDURE — 70553 MRI BRAIN STEM W/O & W/DYE: CPT

## 2024-04-21 PROCEDURE — 87040 BLOOD CULTURE FOR BACTERIA: CPT | Performed by: EMERGENCY MEDICINE

## 2024-04-21 PROCEDURE — 83050 HGB METHEMOGLOBIN QUAN: CPT | Performed by: EMERGENCY MEDICINE

## 2024-04-21 PROCEDURE — 84145 PROCALCITONIN (PCT): CPT | Performed by: INTERNAL MEDICINE

## 2024-04-21 PROCEDURE — 94761 N-INVAS EAR/PLS OXIMETRY MLT: CPT

## 2024-04-21 PROCEDURE — 71045 X-RAY EXAM CHEST 1 VIEW: CPT

## 2024-04-21 PROCEDURE — 25010000002 LORAZEPAM PER 2 MG

## 2024-04-21 PROCEDURE — 84484 ASSAY OF TROPONIN QUANT: CPT | Performed by: EMERGENCY MEDICINE

## 2024-04-21 PROCEDURE — 80053 COMPREHEN METABOLIC PANEL: CPT | Performed by: STUDENT IN AN ORGANIZED HEALTH CARE EDUCATION/TRAINING PROGRAM

## 2024-04-21 PROCEDURE — 94799 UNLISTED PULMONARY SVC/PX: CPT

## 2024-04-21 PROCEDURE — 80053 COMPREHEN METABOLIC PANEL: CPT | Performed by: EMERGENCY MEDICINE

## 2024-04-21 PROCEDURE — 36600 WITHDRAWAL OF ARTERIAL BLOOD: CPT | Performed by: EMERGENCY MEDICINE

## 2024-04-21 PROCEDURE — 93005 ELECTROCARDIOGRAM TRACING: CPT | Performed by: EMERGENCY MEDICINE

## 2024-04-21 PROCEDURE — 70450 CT HEAD/BRAIN W/O DYE: CPT

## 2024-04-21 PROCEDURE — 82805 BLOOD GASES W/O2 SATURATION: CPT | Performed by: EMERGENCY MEDICINE

## 2024-04-21 PROCEDURE — 82077 ASSAY SPEC XCP UR&BREATH IA: CPT | Performed by: EMERGENCY MEDICINE

## 2024-04-21 PROCEDURE — 87899 AGENT NOS ASSAY W/OPTIC: CPT | Performed by: INTERNAL MEDICINE

## 2024-04-21 PROCEDURE — 82948 REAGENT STRIP/BLOOD GLUCOSE: CPT

## 2024-04-21 PROCEDURE — 99223 1ST HOSP IP/OBS HIGH 75: CPT | Performed by: STUDENT IN AN ORGANIZED HEALTH CARE EDUCATION/TRAINING PROGRAM

## 2024-04-21 PROCEDURE — 82375 ASSAY CARBOXYHB QUANT: CPT | Performed by: EMERGENCY MEDICINE

## 2024-04-21 PROCEDURE — 87449 NOS EACH ORGANISM AG IA: CPT | Performed by: INTERNAL MEDICINE

## 2024-04-21 PROCEDURE — 84100 ASSAY OF PHOSPHORUS: CPT | Performed by: STUDENT IN AN ORGANIZED HEALTH CARE EDUCATION/TRAINING PROGRAM

## 2024-04-21 PROCEDURE — 93005 ELECTROCARDIOGRAM TRACING: CPT

## 2024-04-21 PROCEDURE — 36415 COLL VENOUS BLD VENIPUNCTURE: CPT | Performed by: EMERGENCY MEDICINE

## 2024-04-21 PROCEDURE — A9577 INJ MULTIHANCE: HCPCS | Performed by: INTERNAL MEDICINE

## 2024-04-21 PROCEDURE — 85025 COMPLETE CBC W/AUTO DIFF WBC: CPT | Performed by: EMERGENCY MEDICINE

## 2024-04-21 PROCEDURE — 99285 EMERGENCY DEPT VISIT HI MDM: CPT

## 2024-04-21 PROCEDURE — 0 GADOBENATE DIMEGLUMINE 529 MG/ML SOLUTION: Performed by: INTERNAL MEDICINE

## 2024-04-21 PROCEDURE — 82948 REAGENT STRIP/BLOOD GLUCOSE: CPT | Performed by: STUDENT IN AN ORGANIZED HEALTH CARE EDUCATION/TRAINING PROGRAM

## 2024-04-21 PROCEDURE — 83735 ASSAY OF MAGNESIUM: CPT | Performed by: STUDENT IN AN ORGANIZED HEALTH CARE EDUCATION/TRAINING PROGRAM

## 2024-04-21 PROCEDURE — 85025 COMPLETE CBC W/AUTO DIFF WBC: CPT | Performed by: STUDENT IN AN ORGANIZED HEALTH CARE EDUCATION/TRAINING PROGRAM

## 2024-04-21 PROCEDURE — 83735 ASSAY OF MAGNESIUM: CPT | Performed by: EMERGENCY MEDICINE

## 2024-04-21 PROCEDURE — 25010000002 LEVETIRACETAM IN NACL 0.82% 500 MG/100ML SOLUTION: Performed by: STUDENT IN AN ORGANIZED HEALTH CARE EDUCATION/TRAINING PROGRAM

## 2024-04-21 PROCEDURE — 99291 CRITICAL CARE FIRST HOUR: CPT

## 2024-04-21 PROCEDURE — 25810000003 SODIUM CHLORIDE 0.9 % SOLUTION: Performed by: EMERGENCY MEDICINE

## 2024-04-21 PROCEDURE — 83880 ASSAY OF NATRIURETIC PEPTIDE: CPT | Performed by: EMERGENCY MEDICINE

## 2024-04-21 PROCEDURE — 81001 URINALYSIS AUTO W/SCOPE: CPT | Performed by: EMERGENCY MEDICINE

## 2024-04-21 PROCEDURE — 83605 ASSAY OF LACTIC ACID: CPT | Performed by: EMERGENCY MEDICINE

## 2024-04-21 PROCEDURE — 92610 EVALUATE SWALLOWING FUNCTION: CPT

## 2024-04-21 PROCEDURE — 74176 CT ABD & PELVIS W/O CONTRAST: CPT

## 2024-04-21 PROCEDURE — 25010000002 LEVETIRACETAM IN NACL 0.75% 1000 MG/100ML SOLUTION: Performed by: EMERGENCY MEDICINE

## 2024-04-21 RX ORDER — SODIUM CHLORIDE 0.9 % (FLUSH) 0.9 %
10 SYRINGE (ML) INJECTION AS NEEDED
Status: DISCONTINUED | OUTPATIENT
Start: 2024-04-21 | End: 2024-04-23 | Stop reason: HOSPADM

## 2024-04-21 RX ORDER — AMOXICILLIN 250 MG
2 CAPSULE ORAL 2 TIMES DAILY PRN
Status: DISCONTINUED | OUTPATIENT
Start: 2024-04-21 | End: 2024-04-23 | Stop reason: HOSPADM

## 2024-04-21 RX ORDER — ALBUTEROL SULFATE 90 UG/1
2 AEROSOL, METERED RESPIRATORY (INHALATION) EVERY 4 HOURS PRN
COMMUNITY

## 2024-04-21 RX ORDER — METFORMIN HYDROCHLORIDE 500 MG/1
2 TABLET, EXTENDED RELEASE ORAL EVERY 12 HOURS SCHEDULED
COMMUNITY
Start: 2024-04-07

## 2024-04-21 RX ORDER — ASPIRIN 81 MG/1
81 TABLET ORAL DAILY
Status: DISCONTINUED | OUTPATIENT
Start: 2024-04-21 | End: 2024-04-23 | Stop reason: HOSPADM

## 2024-04-21 RX ORDER — POLYETHYLENE GLYCOL 3350 17 G/17G
17 POWDER, FOR SOLUTION ORAL DAILY PRN
Status: DISCONTINUED | OUTPATIENT
Start: 2024-04-21 | End: 2024-04-23 | Stop reason: HOSPADM

## 2024-04-21 RX ORDER — NICOTINE POLACRILEX 4 MG
15 LOZENGE BUCCAL
Status: DISCONTINUED | OUTPATIENT
Start: 2024-04-21 | End: 2024-04-23 | Stop reason: HOSPADM

## 2024-04-21 RX ORDER — NITROGLYCERIN 0.4 MG/1
0.4 TABLET SUBLINGUAL
Status: DISCONTINUED | OUTPATIENT
Start: 2024-04-21 | End: 2024-04-21

## 2024-04-21 RX ORDER — BISACODYL 10 MG
10 SUPPOSITORY, RECTAL RECTAL DAILY PRN
Status: DISCONTINUED | OUTPATIENT
Start: 2024-04-21 | End: 2024-04-23 | Stop reason: HOSPADM

## 2024-04-21 RX ORDER — IBUPROFEN 600 MG/1
1 TABLET ORAL
Status: DISCONTINUED | OUTPATIENT
Start: 2024-04-21 | End: 2024-04-23 | Stop reason: HOSPADM

## 2024-04-21 RX ORDER — DEXTROSE MONOHYDRATE 25 G/50ML
25 INJECTION, SOLUTION INTRAVENOUS
Status: DISCONTINUED | OUTPATIENT
Start: 2024-04-21 | End: 2024-04-23 | Stop reason: HOSPADM

## 2024-04-21 RX ORDER — LORAZEPAM 2 MG/ML
1 INJECTION INTRAMUSCULAR
Status: DISCONTINUED | OUTPATIENT
Start: 2024-04-21 | End: 2024-04-21

## 2024-04-21 RX ORDER — LORAZEPAM 2 MG/ML
1 INJECTION INTRAMUSCULAR ONCE
Status: COMPLETED | OUTPATIENT
Start: 2024-04-21 | End: 2024-04-21

## 2024-04-21 RX ORDER — LORAZEPAM 2 MG/ML
INJECTION INTRAMUSCULAR
Status: COMPLETED
Start: 2024-04-21 | End: 2024-04-21

## 2024-04-21 RX ORDER — LEVETIRACETAM 10 MG/ML
1000 INJECTION INTRAVASCULAR ONCE
Status: COMPLETED | OUTPATIENT
Start: 2024-04-21 | End: 2024-04-21

## 2024-04-21 RX ORDER — GLIMEPIRIDE 2 MG/1
2 TABLET ORAL 2 TIMES DAILY
COMMUNITY
End: 2024-04-23 | Stop reason: HOSPADM

## 2024-04-21 RX ORDER — LEVETIRACETAM 5 MG/ML
500 INJECTION INTRAVASCULAR EVERY 12 HOURS SCHEDULED
Status: DISCONTINUED | OUTPATIENT
Start: 2024-04-21 | End: 2024-04-22

## 2024-04-21 RX ORDER — MONTELUKAST SODIUM 10 MG/1
10 TABLET ORAL NIGHTLY
Status: DISCONTINUED | OUTPATIENT
Start: 2024-04-21 | End: 2024-04-23 | Stop reason: HOSPADM

## 2024-04-21 RX ORDER — BISACODYL 5 MG/1
5 TABLET, DELAYED RELEASE ORAL DAILY PRN
Status: DISCONTINUED | OUTPATIENT
Start: 2024-04-21 | End: 2024-04-23 | Stop reason: HOSPADM

## 2024-04-21 RX ORDER — DILTIAZEM HYDROCHLORIDE 240 MG/1
240 CAPSULE, EXTENDED RELEASE ORAL DAILY
COMMUNITY

## 2024-04-21 RX ORDER — PANTOPRAZOLE SODIUM 40 MG/1
40 TABLET, DELAYED RELEASE ORAL
Status: DISCONTINUED | OUTPATIENT
Start: 2024-04-22 | End: 2024-04-23 | Stop reason: HOSPADM

## 2024-04-21 RX ORDER — DEXTROSE AND SODIUM CHLORIDE 5; .45 G/100ML; G/100ML
50 INJECTION, SOLUTION INTRAVENOUS CONTINUOUS
Status: ACTIVE | OUTPATIENT
Start: 2024-04-21 | End: 2024-04-22

## 2024-04-21 RX ORDER — SODIUM CHLORIDE 9 MG/ML
40 INJECTION, SOLUTION INTRAVENOUS AS NEEDED
Status: DISCONTINUED | OUTPATIENT
Start: 2024-04-21 | End: 2024-04-23 | Stop reason: HOSPADM

## 2024-04-21 RX ORDER — METOPROLOL SUCCINATE 50 MG/1
100 TABLET, EXTENDED RELEASE ORAL DAILY
Status: DISCONTINUED | OUTPATIENT
Start: 2024-04-21 | End: 2024-04-23 | Stop reason: HOSPADM

## 2024-04-21 RX ORDER — ATORVASTATIN CALCIUM 40 MG/1
80 TABLET, FILM COATED ORAL NIGHTLY
Status: DISCONTINUED | OUTPATIENT
Start: 2024-04-21 | End: 2024-04-23 | Stop reason: HOSPADM

## 2024-04-21 RX ORDER — SODIUM CHLORIDE 0.9 % (FLUSH) 0.9 %
10 SYRINGE (ML) INJECTION EVERY 12 HOURS SCHEDULED
Status: DISCONTINUED | OUTPATIENT
Start: 2024-04-21 | End: 2024-04-23 | Stop reason: HOSPADM

## 2024-04-21 RX ADMIN — LORAZEPAM 1 MG: 2 INJECTION INTRAMUSCULAR at 02:00

## 2024-04-21 RX ADMIN — LEVETIRACETAM 500 MG: 5 INJECTION, SOLUTION INTRAVENOUS at 21:35

## 2024-04-21 RX ADMIN — LEVETIRACETAM 500 MG: 5 INJECTION, SOLUTION INTRAVENOUS at 08:47

## 2024-04-21 RX ADMIN — SODIUM CHLORIDE 1000 ML: 9 INJECTION, SOLUTION INTRAVENOUS at 01:18

## 2024-04-21 RX ADMIN — Medication 10 ML: at 08:47

## 2024-04-21 RX ADMIN — MONTELUKAST 10 MG: 10 TABLET, FILM COATED ORAL at 21:34

## 2024-04-21 RX ADMIN — METOPROLOL SUCCINATE 100 MG: 50 TABLET, EXTENDED RELEASE ORAL at 16:00

## 2024-04-21 RX ADMIN — GADOBENATE DIMEGLUMINE 18 ML: 529 INJECTION, SOLUTION INTRAVENOUS at 11:58

## 2024-04-21 RX ADMIN — ATORVASTATIN CALCIUM 80 MG: 40 TABLET, FILM COATED ORAL at 21:34

## 2024-04-21 RX ADMIN — ASPIRIN 81 MG: 81 TABLET, COATED ORAL at 16:00

## 2024-04-21 RX ADMIN — LORAZEPAM 1 MG: 2 INJECTION INTRAMUSCULAR; INTRAVENOUS at 02:00

## 2024-04-21 RX ADMIN — DEXTROSE MONOHYDRATE 25 G: 25 INJECTION, SOLUTION INTRAVENOUS at 08:18

## 2024-04-21 RX ADMIN — LEVETIRACETAM 1000 MG: 1000 INJECTION, SOLUTION INTRAVENOUS at 01:13

## 2024-04-21 RX ADMIN — Medication 10 ML: at 21:35

## 2024-04-21 RX ADMIN — DEXTROSE AND SODIUM CHLORIDE 50 ML/HR: 5; 450 INJECTION, SOLUTION INTRAVENOUS at 16:00

## 2024-04-21 RX ADMIN — APIXABAN 5 MG: 5 TABLET, FILM COATED ORAL at 21:34

## 2024-04-21 NOTE — H&P
[facility]  Date: 2024   Patient Name: Yehuda Weaver  : 1955  MRN: [id number]  Primary Care Physician:  Koby Hernandez MD  Date of admission: 2024    Subjective   Subjective     Chief Complaint: Confusion, unresponsiveness    HPI:    Yehuda Weaver is a 68 y.o. male with past medical history of hypertension, hyperlipidemia, CAD status post CABG, peripheral vascular disease status post stenting, COPD, DVT on Eliquis, new onset seizure in 2023 presented to the ED for evaluation of altered mental status, minimally unresponsive.  As per the ED physician reports, EMS was called by patient's wife stating that patient is confused and very minimally unresponsive.  When the EMS has arrived at the patient's place, patient noted to be ambulating but confused and walking into the walls.  While the EMS was put him in the stretcher and connecting him to the devices patient became confused and had decreased responsiveness and noted to have a tonic-clonic activity that lasted for 30 seconds and resolved by itself.  The EMS gave 3 mg of Versed and since then patient has been having decreased responsiveness.  Patient was evaluated in the ED for new onset seizure in 2023 and patient have not followed up with outpatient neurology or PCP for further evaluation of new onset seizures.  Patient did not had any episodes of seizure after coming to the ED.  Was confused and agitated, received Ativan to get the CT scan done.  During my evaluation patient was awake and able to tell his name.  Noted to have some slurring of speech but could be likely secondary to his slightly swollen tongue from possible tongue bite during the seizure activity.  Still some drowsiness due to likely due to Ativan he has received.    Patient's wife at the bedside, he was at his baseline prior to this episode happened tonight.  Denies any fevers, vomitings, diarrhea.  Since last few  months patient has been telling the family members that his abdomen has been increasing in size but he refused to go to see a physician.  Did not have any trouble with appetite.  Was eating normally in the last few months.  Denies any alcohol use currently.  He has stopped drinking alcohol many years ago.    In the ED, vital signs temperature 98.9, pulse 128, respiratory 14, blood pressure 146/112 on 4 L of nasal, saturating around 94%.  Initial ABG 7.0 8/39/92 on 5 L of nasal cannula, lactic acid was 17.34, repeat ABG showed 7.2 8/41/66 on 4 L, lactic acid has improved to 5.  Normal CK levels, show initial troponin 25, normal proBNP, normal sodium, potassium, creatinine 1.29 baseline is around 1, rest of the CMP with no significant findings, lactic acid 8.4, WBC about 12.18, urinalysis showed 3-5 WBC, none bacteria, negative nitrates.  Urine tox negative.  Normal serum ethanol levels.  CT head without contrast showed no acute abnormality.  Received IV fluids, Ativan and Keppra in the ED.  Patient has been admitted for further evaluation and management of generalized tonic-clonic seizure      Personal History     Past Medical History:   Diagnosis Date    Anticoagulated     Atherosclerosis of native arteries of extremities with intermittent claudication, bilateral legs     Bladder cancer 2004    COPD (chronic obstructive pulmonary disease)     Coronary artery disease     Diverticulitis     DVT OF PROXIMAL LOWER LIMB 06/2022    RIGHT LOWER LEG    GERD (gastroesophageal reflux disease)     Heart attack 2007    History of COVID-19 09/2022    Hx of colonic polyps     Hyperlipidemia     Hypertension     Onychomycosis     PVD (peripheral vascular disease)     Slow to wake up after anesthesia          Past Surgical History:   Procedure Laterality Date    APPENDECTOMY      ARTERIOGRAM LOWER EXTREMITY Left 7/10/2023    Procedure: LEFT LOWER EXTERMITY ANGIOGRAM WITH TIBAL ARTERY ANGIOPLASTY;  Surgeon: Donna Bean  MD Carmelo;  Location: [facility] 18/19;  Service: Vascular;  Laterality: Left;    ATHRECTOMY ILIAC, FEMORAL, TIBIAL ARTERY Left 10/20/2022    Procedure: LEFT SUPERFICIAL FEMORAL ARTERY LASER ATHERECTOMY AND COVERED STENT, ANGIOPLASTY OF POPLITEAL AND TIBIAL PERINEAL TRUNK AND PHARMACOLOGIC THROMBOLYSIS;  Surgeon: Donna Bean Jr., MD;  Location: [facility] 18/19;  Service: Vascular;  Laterality: Left;    BACK SURGERY      BLADDER SURGERY  2004    COLONOSCOPY  07/22/2020    Dr. Castano    CORONARY ARTERY BYPASS GRAFT  2007    Triple coronary bypass    EKOS CATHETER PLACEMENT Right 06/16/2022    Procedure: Right lower extremity arteriogram via left groin approach with placement of thrombolysis infusion catheter;  Surgeon: Donna Bean Jr., MD;  Location: [facility] 18/19;  Service: Vascular;  Laterality: Right;    EKOS CATHETER REMOVAL N/A 06/17/2022    Procedure: EKOS CATHETER REMOVAL, RIGHT LOWER EXTREMITY ARTERIOGRAM WITH PERCUTANEOUS TRANSLUMINAL ANGIOPLASTY, ASPIRATION PNEUMBRA THROMBECTOMY, AND IVUS;  Surgeon: Yehuda Salgado MD;  Location: [facility] 18/19;  Service: Vascular;  Laterality: N/A;    FEMORAL ARTERY STENT Right 06/17/2022    FEMORAL POPLITEAL BYPASS Left 2/2/2023    Procedure: LEFT FEMORAL BELOW KNEE BYPASS INSITU GREATER SAPHENOUS VEIN;  Surgeon: Donna Bean Jr., MD;  Location: [facility];  Service: Vascular;  Laterality: Left;    HERNIA REPAIR      LEG THROMBECTOMY/EMBOLECTOMY Right 06/17/2022    UPPER GASTROINTESTINAL ENDOSCOPY  07/22/2020    Dr. Castano    VASCULAR SURGERY Right 2022         Family History   Problem Relation Age of Onset    Heart failure Mother     Clotting disorder Father     Heart attack Father     Lung cancer Brother         Unsure of age    Colon cancer Neg Hx     Malig Hyperthermia Neg Hx          Social History     Socioeconomic History    Marital status:    Tobacco Use    Smoking status: Former      Current packs/day: 0.00     Average packs/day: 0.5 packs/day for 54.0 years (27.0 ttl pk-yrs)     Types: Cigarettes     Start date: 1969     Quit date: 2023     Years since quittin.2    Smokeless tobacco: Never    Tobacco comments:     23 STATES DROPPED FROM 1 PPD TO 1/2 PPD . not smoke for 3 days   Vaping Use    Vaping status: Never Used   Substance and Sexual Activity    Alcohol use: Not Currently    Drug use: Never    Sexual activity: Defer         Home Medications:  Albuterol, albuterol, apixaban, aspirin, atorvastatin, cetirizine, dilTIAZem, doxazosin, empagliflozin, ferrous sulfate, fluticasone, lisinopril-hydrochlorothiazide, metFORMIN, metoprolol succinate XL, montelukast, multivitamin, and omeprazole    Allergies:  Allergies   Allergen Reactions    Penicillins GI Intolerance       Review of Systems   Unable to obtain review of systems completely as the patient is drowsy from the Ativan    Objective   Objective     Vitals:   Temp:  [98.9 °F (37.2 °C)] 98.9 °F (37.2 °C)  Heart Rate:  [128] 128  Resp:  [14-18] 18  BP: (109-146)/() 109/66  Flow (L/min):  [4] 4    Physical Exam    Constitutional: Awake, alert, no acute distress   Eyes: Pupils equal, sclerae anicteric, no conjunctival injection   HENT: NCAT, mucous membranes moist, mild hematoma on the lower part of the tongue with mild swelling of the tongue.   Neck: Supple, no thyromegaly, no lymphadenopathy, trachea midline   Respiratory: Clear to auscultation bilaterally, nonlabored respirations    Cardiovascular: RRR, no murmurs\   Gastrointestinal: Positive bowel sounds, soft, does not appear to be distressed with palpation   Musculoskeletal: No bilateral ankle edema, no clubbing or cyanosis to extremities   Neurologic: Drowsy and disoriented, able to move all his extremities spontaneously, not able to follow the commands completely     Result Review    Result Review:  I have personally reviewed the results from the time of this  admission to 4/21/2024 04:57 EDT and agree with these findings:  [x]  Laboratory  []  Microbiology  [x]  Radiology  [x]  EKG/Telemetry   []  Cardiology/Vascular   []  Pathology  []  Old records  []  Other:        Imaging Results (Last 24 Hours)       Procedure Component Value Units Date/Time    CT Head Without Contrast [[id number]] Collected: 04/21/24 0203     Updated: 04/21/24 0208    Narrative:      CT HEAD WO CONTRAST-     Date of Exam: 4/21/2024 1:50 AM     Indication: AMS (altered mental status).     Comparison: 12/9/2023.     Technique:   Axial CT images were obtained of the head without contrast  administration.  Reconstructed 2D coronal and sagittal images were also  obtained. Automated exposure control and iterative construction methods  were used.     FINDINGS:  A routine nonenhanced head CT was performed. No acute brain abnormality  is seen. No acute infarct. No acute intracranial hemorrhage. No midline  shift or acute intracranial mass effect is seen. The ventricular system  and the extra-axial spaces are prominent. There is suspected mild  chronic small vessel ischemic disease. Arterial calcifications are seen.  No acute skull fracture is identified. Nasal airway is in place from a  right-sided approach. Benign external auditory canal debris is  suspected. Mild age-indeterminate mucosal thickening involves the imaged  paranasal sinuses.  No air-fluid interfaces are seen within the imaged  paranasal sinuses.       Impression:      No acute brain abnormality is appreciated.                    Electronically Signed By-Ifeanyi Brice MD On:4/21/2024 2:06 AM       XR Chest 1 View [[id number]] Collected: 04/21/24 0133     Updated: 04/21/24 0139    Narrative:      XR CHEST 1 VW-     Date of Exam: 4/21/2024 1:10 AM     Indication: Weak/Dizzy/AMS triage protocol     Comparison: 12/29/2023; 1/18/2009.     Findings:  A single AP semiupright portable view of the chest is provided for  review. There may be new  atelectasis and/or infiltrate(s) in the right  lung base. Pneumonia cannot be excluded. There is probably no acute  infiltrate on the left. There are low lung volumes. There is slight  motion artifact on the study. External artifacts obscure detail. The  patient has undergone median sternotomy and CABG surgery. The thoracic  aorta is atherosclerotic. No cardiac enlargement. No definite pleural  effusion. Chronic calcified granulomatous disease involves the chest.       Impression:      There is possible new right basilar pneumonia. Consider imaging  follow-up to ensure a benign progression and to exclude an underlying  malignant process.        Electronically Signed By-Ifeanyi Brice MD On:4/21/2024 1:37 AM                      Assessment & Plan   Assessment / Plan     Assessment/Plan:   Generalized tonic-clonic seizure  Postictal confusion  Anion gap metabolic acidosis  Lactic acidosis  Concern for increasing abdominal distension since 3 months   Hypertension  Hyperlipidemia  CAD s/p CABG  PAD s/p stenting, left femoral popliteal bypass  COPD  Chronic DVT on Eliquis    Plan  Admit to inpatient, telemetry  Seizure precautions  As needed Ativan  Received Keppra 1000 mg in the ED, continue 500 mg IV twice daily  EEG in the a.m.  MRI brain w and wo Contrast   Consult neurology in the AM   If the patient has seizures again, we will move him to the ICU to monitor on rapid EEG and will consult Neurology STAT   CT abdomen pelvis with out contrast to evaluate for increasing abdominal distension   N.p.o. except sips with meds  Nursing dysphagia screen, resume diet in the a.m.  Hypoglycemic protocol  Trend lactic acid  Resume other appropriate home medications once reconciled and cleared to take PO meds     DVT prophylaxis:  No DVT prophylaxis order currently exists.    CODE STATUS:    Level Of Support Discussed With: Health Care Surrogate  Code Status (Patient has no pulse and is not breathing): CPR (Attempt to  Resuscitate)  Medical Interventions (Patient has pulse or is breathing): Full Support      Admission Status:  I believe this patient meets inpatient status.    Part of this note may be an electronic transcription/translation of spoken language to printed text using the Dragon Dictation System    Erika Lacey MD

## 2024-04-21 NOTE — ED PROVIDER NOTES
Time: 1:18 AM EDT  Date of encounter:  4/21/2024  Independent Historian/Clinical History and Information was obtained by:   EMS  Chief Complaint: Altered mental status    History is limited by: Altered Mental Status    History of Present Illness:  Patient is a 68 y.o. year old male who presents to the emergency department for evaluation of altered mental status.  According to EMS, the wife called 911 due to the patient being confused and essentially unresponsive.  She states that he had went to bed about 10 minutes earlier.  When EMS arrived, the patient was ambulating was confused and walking into the wall.  They state that the place the patient on the stretcher, as they were checking the patient's blood sugar and placing the patient on the monitor, they state that he became confused then had decreased responsiveness then what appeared to be have tonic-clonic activity that lasted for 30 seconds.  This stopped on its own.  EMS then gave 3 mg of Versed.  They note that the patient had decreased responsiveness since that time.  EMS was called about an hour and a half ago.  In review of the medical record it does appear that the patient was evaluated for first-time seizure in December.  It does not appear that he followed up or takes any seizure medicine at this time.  No other history is obtainable    HPI    Patient Care Team  Primary Care Provider: Koby Hernandez MD    Past Medical History:     Allergies   Allergen Reactions    Penicillins GI Intolerance     Past Medical History:   Diagnosis Date    Anticoagulated     Atherosclerosis of native arteries of extremities with intermittent claudication, bilateral legs     Bladder cancer 2004    COPD (chronic obstructive pulmonary disease)     Coronary artery disease     Diverticulitis     DVT OF PROXIMAL LOWER LIMB 06/2022    RIGHT LOWER LEG    GERD (gastroesophageal reflux disease)     Heart attack 2007    History of COVID-19 09/2022    Hx of colonic polyps      Hyperlipidemia     Hypertension     Onychomycosis     PVD (peripheral vascular disease)     Slow to wake up after anesthesia      Past Surgical History:   Procedure Laterality Date    APPENDECTOMY      ARTERIOGRAM LOWER EXTREMITY Left 7/10/2023    Procedure: LEFT LOWER EXTERMITY ANGIOGRAM WITH TIBAL ARTERY ANGIOPLASTY;  Surgeon: Donna Bean Jr., MD;  Location: [facility] 18/19;  Service: Vascular;  Laterality: Left;    ATHRECTOMY ILIAC, FEMORAL, TIBIAL ARTERY Left 10/20/2022    Procedure: LEFT SUPERFICIAL FEMORAL ARTERY LASER ATHERECTOMY AND COVERED STENT, ANGIOPLASTY OF POPLITEAL AND TIBIAL PERINEAL TRUNK AND PHARMACOLOGIC THROMBOLYSIS;  Surgeon: Donna Bean Jr., MD;  Location: [facility] 18/19;  Service: Vascular;  Laterality: Left;    BACK SURGERY      BLADDER SURGERY  2004    COLONOSCOPY  07/22/2020    Dr. Castano    CORONARY ARTERY BYPASS GRAFT  2007    Triple coronary bypass    EKOS CATHETER PLACEMENT Right 06/16/2022    Procedure: Right lower extremity arteriogram via left groin approach with placement of thrombolysis infusion catheter;  Surgeon: Donna Bean Jr., MD;  Location: [facility] 18/19;  Service: Vascular;  Laterality: Right;    EKOS CATHETER REMOVAL N/A 06/17/2022    Procedure: EKOS CATHETER REMOVAL, RIGHT LOWER EXTREMITY ARTERIOGRAM WITH PERCUTANEOUS TRANSLUMINAL ANGIOPLASTY, ASPIRATION PNEUMBRA THROMBECTOMY, AND IVUS;  Surgeon: Yehuda Salgado MD;  Location: [facility] 18/19;  Service: Vascular;  Laterality: N/A;    FEMORAL ARTERY STENT Right 06/17/2022    FEMORAL POPLITEAL BYPASS Left 2/2/2023    Procedure: LEFT FEMORAL BELOW KNEE BYPASS INSITU GREATER SAPHENOUS VEIN;  Surgeon: Donna Bean Jr., MD;  Location: [facility];  Service: Vascular;  Laterality: Left;    HERNIA REPAIR      LEG THROMBECTOMY/EMBOLECTOMY Right 06/17/2022    UPPER GASTROINTESTINAL ENDOSCOPY  07/22/2020    Dr. Castano    VASCULAR SURGERY Right 2022      Family History   Problem Relation Age of Onset    Heart failure Mother     Clotting disorder Father     Heart attack Father     Lung cancer Brother         Unsure of age    Colon cancer Neg Hx     Malig Hyperthermia Neg Hx        Home Medications:  Prior to Admission medications    Medication Sig Start Date End Date Taking? Authorizing Provider   albuterol (PROVENTIL) (2.5 MG/3ML) 0.083% nebulizer solution Take 2.5 mg by nebulization Every 4 (Four) Hours As Needed for Wheezing.    Siria Oreilly MD   ALBUTEROL IN Inhale Take As Directed. RESCUE  INHALER    Siria Oreilly MD   Aspirin Low Dose 81 MG EC tablet Take 1 tablet by mouth Daily. HOLD PRIOR TO SURGERY PER MD INSTRUCTIONS 1/18/23   Siria Oreilly MD   atorvastatin (LIPITOR) 80 MG tablet Take 1 tablet by mouth Every Night. 7/14/21   Siria Oreilly MD   cetirizine (zyrTEC) 10 MG tablet Take 1 tablet by mouth Daily. 12/22/22   Siria Oreilly MD   dilTIAZem (TIAZAC) 240 MG 24 hr capsule 1 capsule Daily. 7/14/21   Siria Oreilly MD   doxazosin (CARDURA) 4 MG tablet Take 1 tablet by mouth Every Night. 6/9/21   Siria Oreilly MD   Eliquis 5 MG tablet tablet Take 1 tablet by mouth 2 (Two) Times a Day. HOLD PRIOR TO SURGERY PER MD INSTRUCTIONS 8/8/22   Siria Oreilly MD   FeroSul 325 (65 Fe) MG tablet Take 1 tablet by mouth Daily. 2/7/24   Siria Oreilly MD   fluticasone (FLONASE) 50 MCG/ACT nasal spray 1 spray into the nostril(s) as directed by provider Every Night. 9/15/21   Siria Oreilly MD   Jardiance 10 MG tablet tablet Take 1 tablet by mouth Daily. 12/21/23   Siria Oreilly MD   lisinopril-hydrochlorothiazide (PRINZIDE,ZESTORETIC) 20-25 MG per tablet Take 1 tablet by mouth Daily. 8/8/21   Siria Oreilly MD   metFORMIN (GLUCOPHAGE) 500 MG tablet Take 1 tablet by mouth 2 (Two) Times a Day With Meals.    Siria Oreilly MD   metoprolol succinate XL (TOPROL-XL) 100 MG 24  "hr tablet Take 1 tablet by mouth Daily. 21   Siria Oreilly MD   montelukast (SINGULAIR) 10 MG tablet Take 1 tablet by mouth Every Night. 21   Siria Oreilly MD   multivitamin (THERAGRAN) tablet tablet Take 1 tablet by mouth Daily. HOLD FOR SURGERY    Siria Oreilly MD   omeprazole (priLOSEC) 40 MG capsule Take 1 capsule by mouth Daily.    Siria Oreilly MD        Social History:   Social History     Tobacco Use    Smoking status: Former     Current packs/day: 0.00     Average packs/day: 0.5 packs/day for 54.0 years (27.0 ttl pk-yrs)     Types: Cigarettes     Start date: 1969     Quit date: 2023     Years since quittin.2    Smokeless tobacco: Never    Tobacco comments:     23 STATES DROPPED FROM 1 PPD TO 1/2 PPD . not smoke for 3 days   Vaping Use    Vaping status: Never Used   Substance Use Topics    Alcohol use: Not Currently    Drug use: Never         Review of Systems:  Review of Systems   Unable to perform ROS: Mental status change        Physical Exam:  /84 (BP Location: Left arm, Patient Position: Lying)   Pulse 87   Temp 98.8 °F (37.1 °C) (Oral)   Resp 18   Ht 170.2 cm (67\")   Wt 89 kg (196 lb 3.4 oz)   SpO2 93%   BMI 30.73 kg/m²     Physical Exam  Vitals and nursing note reviewed.   Constitutional:       General: He is not in acute distress.     Appearance: He is ill-appearing. He is not toxic-appearing or diaphoretic.   HENT:      Head: Normocephalic and atraumatic.      Mouth/Throat:      Mouth: Mucous membranes are moist.   Eyes:      Pupils: Pupils are equal, round, and reactive to light.   Cardiovascular:      Rate and Rhythm: Regular rhythm. Tachycardia present.      Pulses: Normal pulses.           Carotid pulses are 2+ on the right side and 2+ on the left side.       Radial pulses are 2+ on the right side and 2+ on the left side.        Femoral pulses are 2+ on the right side and 2+ on the left side.       Popliteal pulses are 2+ " on the right side and 2+ on the left side.        Dorsalis pedis pulses are 2+ on the right side and 2+ on the left side.        Posterior tibial pulses are 2+ on the right side and 2+ on the left side.      Heart sounds: Normal heart sounds. No murmur heard.  Pulmonary:      Effort: Pulmonary effort is normal. No accessory muscle usage, respiratory distress or retractions.      Breath sounds: Normal breath sounds. No wheezing, rhonchi or rales.   Abdominal:      General: Abdomen is flat. There is no distension.      Palpations: Abdomen is soft. There is no mass or pulsatile mass.      Tenderness: There is no abdominal tenderness. There is no right CVA tenderness, left CVA tenderness, guarding or rebound.      Comments: No rigidity   Musculoskeletal:         General: No swelling, tenderness or deformity.      Cervical back: Neck supple. No tenderness.      Right lower leg: No edema.      Left lower leg: No edema.   Skin:     General: Skin is warm and dry.      Capillary Refill: Capillary refill takes less than 2 seconds.      Coloration: Skin is not jaundiced or pale.      Findings: No erythema.   Neurological:      General: No focal deficit present.      Mental Status: He is lethargic.      GCS: GCS eye subscore is 2. GCS verbal subscore is 2. GCS motor subscore is 5.      Cranial Nerves: Facial asymmetry present.      Comments: A complete neurological exam cannot be performed secondary to altered mental status   Psychiatric:         Mood and Affect: Mood normal.         Behavior: Behavior normal.                  Procedures:  Procedures      Medical Decision Making:      Comorbidities that affect care:    Diverticulitis, coronary disease, hyperlipidemia, hypertension, DVT, COPD, GERD, seizure    External Notes reviewed:    None      The following orders were placed and all results were independently analyzed by me:  Orders Placed This Encounter   Procedures    Blood Culture - Blood,    Blood Culture - Blood,    S.  Pneumo Ag Urine or CSF - Urine, Urine, Clean Catch    Legionella Antigen, Urine - Urine, Urine, Clean Catch    XR Chest 1 View    CT Head Without Contrast    MRI Brain With & Without Contrast    CT Abdomen Pelvis Without Contrast    [place] Draw    Comprehensive Metabolic Panel    Single High Sensitivity Troponin T    Magnesium    Urinalysis With Microscopic If Indicated (No Culture) - Urine, Clean Catch    CBC Auto Differential    ABG with Co-Ox and Electrolytes    Lactic Acid, Plasma    Urine Drug Screen - Urine, Clean Catch    Ethanol    CK    Urinalysis, Microscopic Only - Urine, Clean Catch    ABG with Co-Ox and Electrolytes    STAT Lactic Acid, Reflex    BNP    Phosphorus    Magnesium    Comprehensive Metabolic Panel    CBC Auto Differential    STAT Lactic Acid, Reflex    CBC (No Diff)    Basic Metabolic Panel    Procalcitonin    Lactic Acid, Plasma    STAT Lactic Acid, Reflex    RPR Quant    Vitamin B12    Vitamin D,25-Hydroxy    Vitamin B6    CBC (No Diff)    Basic Metabolic Panel    Ambulatory Referral to Sleep Medicine    Ambulatory Referral to Urology    Ambulatory Referral to Neurology    Undress & Gown    Vital Signs    Orthostatic Blood Pressure    Discharge Follow-up with PCP    Inpatient Neurology Consult General    POC Glucose Once    POC Glucose Once    POC Glucose Once    POC Glucose Once    POC Glucose Once    ECG 12 Lead ED Triage Standing Order; Weak / Dizzy / AMS    EEG    Inpatient Admission    Discharge patient    CBC & Differential    Green Top (Gel)    Lavender Top    Gold Top - SST    Light Blue Top    CBC & Differential       Medications Given in the Emergency Department:  Medications   dextrose 5 % and sodium chloride 0.45 % infusion (0 mL/hr Intravenous Stopped 4/22/24 1717)   levETIRAcetam in NaCl 0.75% (KEPPRA) IVPB 1,000 mg (0 mg Intravenous Stopped 4/21/24 0204)   sodium chloride 0.9 % bolus 1,000 mL (0 mL Intravenous Stopped 4/21/24 0205)   LORazepam (ATIVAN) injection 1 mg (1 mg  Intravenous Given 4/21/24 0200)   gadobenate dimeglumine (MULTIHANCE) injection 18 mL (18 mL Intravenous Given 4/21/24 1158)        ED Course:    ED Course as of 04/23/24 1903   Sun Apr 21, 2024   0129 EKG:    Rhythm: Sinus tachycardia  Rate: 127  Intervals: Normal UT and QT interval  Right bundle branch block present  T-wave: No pathological T waves  ST Segment: ST depression V3, V4, V5, V6, II, no obvious pathological ST elevation and reciprocal ST depression to suggest STEMI    EKG Comparison: EKG is changed from EKG performed July 7, 2023    Interpreted by me   [SD]      ED Course User Index  [SD] Buster Butler DO       Labs:    Lab Results (last 24 hours)       Procedure Component Value Units Date/Time    Vitamin D,25-Hydroxy [[id number]]  (Normal) Collected: 04/23/24 0417    Specimen: Blood from Arm, Left Updated: 04/23/24 1037     25 Hydroxy, Vitamin D 30.8 ng/ml     Narrative:      Reference Range for Total Vitamin D 25(OH)     Deficiency <20.0 ng/mL   Insufficiency 21-29 ng/mL   Sufficiency  ng/mL  Toxicity >100 ng/ml      CBC (No Diff) [[id number]]  (Abnormal) Collected: 04/23/24 0417    Specimen: Blood from Arm, Left Updated: 04/23/24 0534     WBC 11.78 10*3/mm3      RBC 4.54 10*6/mm3      Hemoglobin 13.4 g/dL      Hematocrit 41.0 %      MCV 90.3 fL      MCH 29.5 pg      MCHC 32.7 g/dL      RDW 14.8 %      RDW-SD 48.9 fl      MPV 11.0 fL      Platelets 193 10*3/mm3     Basic Metabolic Panel [[id number]]  (Abnormal) Collected: 04/23/24 0417    Specimen: Blood from Arm, Left Updated: 04/23/24 0553     Glucose 109 mg/dL      BUN 11 mg/dL      Creatinine 0.89 mg/dL      Sodium 135 mmol/L      Potassium 3.8 mmol/L      Chloride 96 mmol/L      CO2 27.6 mmol/L      Calcium 9.2 mg/dL      BUN/Creatinine Ratio 12.4     Anion Gap 11.4 mmol/L      eGFR 93.3 mL/min/1.73     Narrative:      GFR Normal >60  Chronic Kidney Disease <60  Kidney Failure <15               Imaging:    No Radiology Exams Resulted Within  Past 24 Hours      Differential Diagnosis and Discussion:    Altered Mental Status: Based on the patient's signs and symptoms, differential diagnosis includes but is not limited to meningitis, stroke, sepsis, subarachnoid hemorrhage, intracranial bleeding, encephalitis, and metabolic encephalopathy.    All labs were reviewed and interpreted by me.  All X-rays impressions were independently interpreted by me.  EKG was interpreted by me.  CT scan radiology impression was interpreted by me.    MDM  Number of Diagnoses or Management Options  Lactic acidosis  Metabolic acidosis  Seizure  Diagnosis management comments:   The wife later arrived in the emergency room.  She states that she actually called the ambulance because the patient had a sustained seizure.  She actually witnessed the seizure for a period of time.  He states that she was actually trying to open up his airway.  By the time EMS had got there the seizure had stopped and the patient was postictal.  Notes that he had similar symptoms in December.  He was discharged home to follow-up with neurology.  The patient refused to follow-up as an outpatient.  The patient has not been on seizure medication since that time.    The patient's CBC was reviewed and shows no abnormalities of critical concern.  Of note, there is no anemia requiring a blood transfusion and the platelet count is acceptable    The patient's CMP was reviewed and shows no abnormalities of critical concern.  Of note, the patient's sodium and potassium are acceptable.  The patient's liver enzymes are unremarkable.  The patient's renal function including creatinine is preserved.  The patient had an elevated anion gap    Patient's total CK was normal    The patient had a normal proBNP.  This makes acute decompensated heart failure unlikely    Patient with alcohol level of 0    The patient's EKG demonstrated a sinus tachycardia.  The EKG demonstrated no evidence of dysrhythmia.  The patient had no  acute ST abnormalities or acute T wave abnormalities to indicate STEMI or other acute cardiac pathology, injury or ischemia    Patient's urine toxicology screen was negative    Patient's lactate was elevated at 8.4    2 blood cultures were ordered.  The results are pending at the time of disposition    A CT scan of the brain was performed while in the emergency room.  The CT scan demonstrated no evidence for acute abnormalities including acute infarct, hemorrhage, mass or edema    X-ray demonstrates a possible new right basilar pneumonia.  I do feel that the patient most likely has aspiration pneumonitis.       Amount and/or Complexity of Data Reviewed  Clinical lab tests: reviewed  Tests in the radiology section of CPT®: reviewed  Tests in the medicine section of CPT®: reviewed  Review and summarize past medical records: yes (I reviewed the patient's ED note from December 9, 2023.  It appears the patient was evaluated for new onset seizure on that visit)  Discuss the patient with other providers: yes (03:34 EDT  I discussed the case with the hospitalist, Dr. Sparks.  We have discussed the patient's presenting symptoms, laboratory values, imaging and condition at the time of admission.  They will evaluate the patient in the emergency room and admit the patient to the hospital)    Critical Care  Total time providing critical care: 35 minutes (Total critical care time of.  Total critical care time documented does not include time spent on separately billed procedures for services of nurses or physician assistants.  I personally saw and examined the patient.  I have reviewed all diagnostic interpretations and treatment plans as written.  I was present for the key portions of any procedures performed and the inclusive time noted in any critical care statement.  Critical care time includes patient management by me, time spent at the patient bedside, time to review labs/ ABG's, imaging results, discussing patient care,  documentation in the medical record and time spent with family or caregiver)           Social Determinants of Health:    Patient has presented with family members who are responsible, reliable and will ensure follow up care.      Disposition and Care Coordination:    Admit:   Through independent evaluation of the patient's history, physical, and imperical data, the patient meets criteria for inpatient admission to the hospital.        Final diagnoses:   Seizure   Lactic acidosis   Metabolic acidosis        ED Disposition       ED Disposition   Decision to Admit    Condition   --    Comment   Level of Care: Telemetry [5]   Diagnosis: Seizure [[id number]]   Certification: I Certify That Inpatient Hospital Services Are Medically Necessary For Greater Than 2 Midnights                 This medical record created using voice recognition software.             Buster Butler DO  04/23/24 3056

## 2024-04-21 NOTE — PLAN OF CARE
Goal Outcome Evaluation:  Plan of Care Reviewed With: patient      ASSESSMENT/ PLAN OF CARE:  Pt presents with limitations, noted below, that impede his ability to swallow safely and maintain nutrition. The skills of a therapist will be required to safely and effectively implement the following treatment plan to restore maximal level of function.    PROBLEMS:  1.  Swallow delay, risk of aspiration                                             TREATMENT: Dysphagia therapy to address swallow function through exercises and education of strategies.     FREQUENCY/DURATION: Once daily 5 times per week    REHAB POTENTIAL:  Pt has fair to good rehab potential.  The following limitations may influence improvement/ length of tx: Medical status.    RECOMMENDATIONS:   1.   DIET: Regular solids cut small additional moisture, thin liquid.    2.  POSITION: Positioning fully upright for all p.o. intake and 30 minutes following.    3.  COMPENSATORY STRATEGIES: Alternate small bites and small sips of solids and liquids at a slow rate.

## 2024-04-21 NOTE — PLAN OF CARE
Problem: Adult Inpatient Plan of Care  Goal: Plan of Care Review  4/21/2024 1625 by Mckenna Chowdhury, RN  Outcome: Ongoing, Progressing

## 2024-04-21 NOTE — THERAPY EVALUATION
Acute Care - Speech Language Pathology   Swallow Initial Evaluation  Tho     Patient Name: Yehuda Weaver  : 1955  MRN: [id number]  Today's Date: 2024               Admit Date: 2024    Visit Dx:     ICD-10-CM ICD-9-CM   1. Seizure  R56.9 780.39   2. Lactic acidosis  E87.20 276.2   3. Metabolic acidosis  E87.20 276.2   4. Oropharyngeal dysphagia  R13.12 787.22     Patient Active Problem List   Diagnosis    Chronic obstructive pulmonary disease    Coronary artery disease    Diverticulitis    Hyperlipidemia    Hypertension    Arterial stent thrombosis, initial encounter    Atherosclerosis of native arteries of extremities with intermittent claudication, bilateral legs    Tobacco dependence due to cigarettes    PVD (peripheral vascular disease)    Seizure     Past Medical History:   Diagnosis Date    Anticoagulated     Atherosclerosis of native arteries of extremities with intermittent claudication, bilateral legs     Bladder cancer     COPD (chronic obstructive pulmonary disease)     Coronary artery disease     Diverticulitis     DVT OF PROXIMAL LOWER LIMB 2022    RIGHT LOWER LEG    GERD (gastroesophageal reflux disease)     Heart attack     History of COVID-19 2022    Hx of colonic polyps     Hyperlipidemia     Hypertension     Onychomycosis     PVD (peripheral vascular disease)     Slow to wake up after anesthesia      Past Surgical History:   Procedure Laterality Date    APPENDECTOMY      ARTERIOGRAM LOWER EXTREMITY Left 7/10/2023    Procedure: LEFT LOWER EXTERMITY ANGIOGRAM WITH TIBAL ARTERY ANGIOPLASTY;  Surgeon: Donna Bean Jr., MD;  Location: [facility] ;  Service: Vascular;  Laterality: Left;    ATHRECTOMY ILIAC, FEMORAL, TIBIAL ARTERY Left 10/20/2022    Procedure: LEFT SUPERFICIAL FEMORAL ARTERY LASER ATHERECTOMY AND COVERED STENT, ANGIOPLASTY OF POPLITEAL AND TIBIAL PERINEAL TRUNK AND PHARMACOLOGIC THROMBOLYSIS;  Surgeon: Donna Bean Jr., MD;   Location: [facility] OR 18/19;  Service: Vascular;  Laterality: Left;    BACK SURGERY      BLADDER SURGERY  2004    COLONOSCOPY  07/22/2020    Dr. Castano    CORONARY ARTERY BYPASS GRAFT  2007    Triple coronary bypass    EKOS CATHETER PLACEMENT Right 06/16/2022    Procedure: Right lower extremity arteriogram via left groin approach with placement of thrombolysis infusion catheter;  Surgeon: Donna Bean Jr., MD;  Location: [facility] OR 18/19;  Service: Vascular;  Laterality: Right;    EKOS CATHETER REMOVAL N/A 06/17/2022    Procedure: EKOS CATHETER REMOVAL, RIGHT LOWER EXTREMITY ARTERIOGRAM WITH PERCUTANEOUS TRANSLUMINAL ANGIOPLASTY, ASPIRATION PNEUMBRA THROMBECTOMY, AND IVUS;  Surgeon: Yehuda Salgado MD;  Location: [facility] OR 18/19;  Service: Vascular;  Laterality: N/A;    FEMORAL ARTERY STENT Right 06/17/2022    FEMORAL POPLITEAL BYPASS Left 2/2/2023    Procedure: LEFT FEMORAL BELOW KNEE BYPASS INSITU GREATER SAPHENOUS VEIN;  Surgeon: Donna Bean Jr., MD;  Location: [facility];  Service: Vascular;  Laterality: Left;    HERNIA REPAIR      LEG THROMBECTOMY/EMBOLECTOMY Right 06/17/2022    UPPER GASTROINTESTINAL ENDOSCOPY  07/22/2020    Dr. Castano    VASCULAR SURGERY Right 2022           Inpatient Speech Pathology Dysphagia Evaluation        PAIN SCALE: Patient stating complaint of throat pain upon swallow, rated 9/10.  Reported to nursing.    PRECAUTIONS/CONTRAINDICATIONS: Standard    SUSPECTED ABUSE/NEGLECT/EXPLOITATION: None indicated.    SOCIAL/PSYCHOLOGICAL NEEDS/BARRIERS: None indicated.    PAST SOCIAL HISTORY: 68-year-old male lives at home with significant other    PRIOR FUNCTION: Independent    PATIENT GOALS/EXPECTATIONS: Return home    HISTORY: 68-year-old male with the above diagnosis referred for speech therapy evaluation to assess due to possible stroke.  No previous speech pathology services reported.  Patient states history of esophageal dysphagia with  dilatation.  States occasionally having meats and breads hanging with him indicating low throat.  Patient endorses history with reflux.    CURRENT DIET LEVEL:NPO    OBJECTIVE:    TEST ADMINISTERED: Clinical dysphagia evaluation    COGNITION/SAFETY AWARENESS: Patient followed basic directions and responded to questions    BEHAVIORAL OBSERVATIONS: Alert and cooperative    ORAL MOTOR EXAM: Grossly within limits    VOICE QUALITY: Adequate    REFLEX EXAM: Deferred    POSTURE: Sitting upright in bed    FEEDING/SWALLOWING FUNCTION: Assessed with nectar liquid, thin liquids, puréed solids, crunchy solid.    CLINICAL OBSERVATIONS: Nectar liquid  by cup with minimal delay, vocal quality remaining clear to cervical auscultation.  Thin liquid by cup and by straw with minimal delay, vocal quality remaining clear to cervical auscultation.  Purée solid with swallow completed with laryngeal elevation noted to palpation.  Crunchy solid with adequate chewing followed by swallow completed clearing the oral cavity.  Patient demonstrating grimace with swallow of solid.  No complaint of globus sensation at this time.    DYSPHAGIA CRITERIA: Risk of aspiration, swallow delay.  Possible esophageal dysphagia.    FUNCTIONAL ASSESSMENT INSTRUMENT: Patient currently scored a level 6 of 7 on Functional Communication Measures for swallowing indicating a 1-19% limitation in function.    ASSESSMENT/ PLAN OF CARE:  Pt presents with limitations, noted below, that impede his ability to swallow safely and maintain nutrition. The skills of a therapist will be required to safely and effectively implement the following treatment plan to restore maximal level of function.    PROBLEMS:  1.  Swallow delay, risk of aspiration                       LTG 1: 30 days: Patient will tolerate diet regular solids and thin liquid utilizing appropriate positioning and strategies with minimal assistance.                       STG 1a: 14 days: Patient will tolerate diet  of regular solids and thin liquids with minimal assistance for strategies and min to no signs or symptoms of aspiration.                       STG 1b: 14 days:Patient/family education.                       TREATMENT: Dysphagia therapy to address swallow function through exercises and education of strategies.     FREQUENCY/DURATION: Once daily 5 times per week    REHAB POTENTIAL:  Pt has fair to good rehab potential.  The following limitations may influence improvement/ length of tx: Medical status.    RECOMMENDATIONS:   1.   DIET: Regular solids cut small additional moisture, thin liquid.    2.  POSITION: Positioning fully upright for all p.o. intake and 30 minutes following.    3.  COMPENSATORY STRATEGIES: Alternate small bites and small sips of solids and liquids at a slow rate.    Pt/responsible party agrees with plan of care and has been informed of all alternatives, risks and benefits.                                                                                              EDUCATION  The patient has been educated in the following areas:   Modified Diet Instruction.              Time Calculation:    Time Calculation- SLP       Row Name 04/21/24 1000             Time Calculation- SLP    SLP Start Time 0830  -TB      SLP Stop Time 0930  -TB      SLP Time Calculation (min) 60 min  -TB      SLP Received On 04/21/24  -TB         Untimed Charges    SLP Eval/Re-eval  ST Eval Oral Pharyng Swallow - 76009  -TB      54881-DM Eval Oral Pharyng Swallow Minutes 60  -TB         Total Minutes    Untimed Charges Total Minutes 60  -TB       Total Minutes 60  -TB                User Key  (r) = Recorded By, (t) = Taken By, (c) = Cosigned By      Initials Name Provider Type    TB Birgit Escobedo SLP Speech and Language Pathologist                    Therapy Charges for Today       Code Description Service Date Service Provider Modifiers Qty    [id number]  ST EVAL ORAL PHARYNG SWALLOW 4 4/21/2024 Birgit Escobedo SLP GN 1                  Birgit Escobedo, SLP  4/21/2024

## 2024-04-22 ENCOUNTER — APPOINTMENT (OUTPATIENT)
Dept: NEUROLOGY | Facility: HOSPITAL | Age: 69
End: 2024-04-22
Payer: MEDICARE

## 2024-04-22 LAB
ANION GAP SERPL CALCULATED.3IONS-SCNC: 9.7 MMOL/L (ref 5–15)
BUN SERPL-MCNC: 9 MG/DL (ref 8–23)
BUN/CREAT SERPL: 12.5 (ref 7–25)
CALCIUM SPEC-SCNC: 8.3 MG/DL (ref 8.6–10.5)
CHLORIDE SERPL-SCNC: 100 MMOL/L (ref 98–107)
CO2 SERPL-SCNC: 25.3 MMOL/L (ref 22–29)
CREAT SERPL-MCNC: 0.72 MG/DL (ref 0.76–1.27)
D-LACTATE SERPL-SCNC: 1.1 MMOL/L (ref 0.5–2)
DEPRECATED RDW RBC AUTO: 50.4 FL (ref 37–54)
EGFRCR SERPLBLD CKD-EPI 2021: 99.5 ML/MIN/1.73
ERYTHROCYTE [DISTWIDTH] IN BLOOD BY AUTOMATED COUNT: 15 % (ref 12.3–15.4)
GLUCOSE BLDC GLUCOMTR-MCNC: 110 MG/DL (ref 70–99)
GLUCOSE BLDC GLUCOMTR-MCNC: 155 MG/DL (ref 70–99)
GLUCOSE BLDC GLUCOMTR-MCNC: 285 MG/DL (ref 70–99)
GLUCOSE SERPL-MCNC: 137 MG/DL (ref 65–99)
HCT VFR BLD AUTO: 39.1 % (ref 37.5–51)
HGB BLD-MCNC: 12.7 G/DL (ref 13–17.7)
MCH RBC QN AUTO: 29.6 PG (ref 26.6–33)
MCHC RBC AUTO-ENTMCNC: 32.5 G/DL (ref 31.5–35.7)
MCV RBC AUTO: 91.1 FL (ref 79–97)
PLATELET # BLD AUTO: 196 10*3/MM3 (ref 140–450)
PMV BLD AUTO: 10.8 FL (ref 6–12)
POTASSIUM SERPL-SCNC: 3.8 MMOL/L (ref 3.5–5.2)
RBC # BLD AUTO: 4.29 10*6/MM3 (ref 4.14–5.8)
SODIUM SERPL-SCNC: 135 MMOL/L (ref 136–145)
VIT B12 BLD-MCNC: 363 PG/ML (ref 211–946)
WBC NRBC COR # BLD AUTO: 14.05 10*3/MM3 (ref 3.4–10.8)

## 2024-04-22 PROCEDURE — 95816 EEG AWAKE AND DROWSY: CPT

## 2024-04-22 PROCEDURE — 94799 UNLISTED PULMONARY SVC/PX: CPT

## 2024-04-22 PROCEDURE — 99222 1ST HOSP IP/OBS MODERATE 55: CPT | Performed by: PSYCHIATRY & NEUROLOGY

## 2024-04-22 PROCEDURE — 82948 REAGENT STRIP/BLOOD GLUCOSE: CPT | Performed by: STUDENT IN AN ORGANIZED HEALTH CARE EDUCATION/TRAINING PROGRAM

## 2024-04-22 PROCEDURE — 84207 ASSAY OF VITAMIN B-6: CPT | Performed by: PSYCHIATRY & NEUROLOGY

## 2024-04-22 PROCEDURE — 83605 ASSAY OF LACTIC ACID: CPT | Performed by: INTERNAL MEDICINE

## 2024-04-22 PROCEDURE — 82948 REAGENT STRIP/BLOOD GLUCOSE: CPT

## 2024-04-22 PROCEDURE — 86593 SYPHILIS TEST NON-TREP QUANT: CPT | Performed by: PSYCHIATRY & NEUROLOGY

## 2024-04-22 PROCEDURE — 80048 BASIC METABOLIC PNL TOTAL CA: CPT | Performed by: INTERNAL MEDICINE

## 2024-04-22 PROCEDURE — 99232 SBSQ HOSP IP/OBS MODERATE 35: CPT | Performed by: INTERNAL MEDICINE

## 2024-04-22 PROCEDURE — 85027 COMPLETE CBC AUTOMATED: CPT | Performed by: INTERNAL MEDICINE

## 2024-04-22 PROCEDURE — 94761 N-INVAS EAR/PLS OXIMETRY MLT: CPT

## 2024-04-22 RX ORDER — HYDROCHLOROTHIAZIDE 25 MG/1
25 TABLET ORAL
Status: DISCONTINUED | OUTPATIENT
Start: 2024-04-22 | End: 2024-04-23 | Stop reason: HOSPADM

## 2024-04-22 RX ORDER — LISINOPRIL 20 MG/1
20 TABLET ORAL
Status: DISCONTINUED | OUTPATIENT
Start: 2024-04-23 | End: 2024-04-23 | Stop reason: HOSPADM

## 2024-04-22 RX ORDER — LEVETIRACETAM 500 MG/1
500 TABLET ORAL EVERY 12 HOURS SCHEDULED
Status: DISCONTINUED | OUTPATIENT
Start: 2024-04-22 | End: 2024-04-23

## 2024-04-22 RX ADMIN — EMPAGLIFLOZIN 10 MG: 10 TABLET, FILM COATED ORAL at 08:53

## 2024-04-22 RX ADMIN — DEXTROSE AND SODIUM CHLORIDE 50 ML/HR: 5; 450 INJECTION, SOLUTION INTRAVENOUS at 08:57

## 2024-04-22 RX ADMIN — PANTOPRAZOLE SODIUM 40 MG: 40 TABLET, DELAYED RELEASE ORAL at 06:07

## 2024-04-22 RX ADMIN — METOPROLOL SUCCINATE 100 MG: 50 TABLET, EXTENDED RELEASE ORAL at 08:52

## 2024-04-22 RX ADMIN — APIXABAN 5 MG: 5 TABLET, FILM COATED ORAL at 20:54

## 2024-04-22 RX ADMIN — Medication 10 ML: at 20:53

## 2024-04-22 RX ADMIN — ASPIRIN 81 MG: 81 TABLET, COATED ORAL at 08:52

## 2024-04-22 RX ADMIN — MONTELUKAST 10 MG: 10 TABLET, FILM COATED ORAL at 20:53

## 2024-04-22 RX ADMIN — ATORVASTATIN CALCIUM 80 MG: 40 TABLET, FILM COATED ORAL at 20:54

## 2024-04-22 RX ADMIN — LEVETIRACETAM 500 MG: 500 TABLET, FILM COATED ORAL at 08:52

## 2024-04-22 RX ADMIN — HYDROCHLOROTHIAZIDE 25 MG: 25 TABLET ORAL at 16:45

## 2024-04-22 RX ADMIN — Medication 10 ML: at 08:53

## 2024-04-22 RX ADMIN — APIXABAN 5 MG: 5 TABLET, FILM COATED ORAL at 08:52

## 2024-04-22 RX ADMIN — LEVETIRACETAM 500 MG: 500 TABLET, FILM COATED ORAL at 20:53

## 2024-04-22 NOTE — PROGRESS NOTES
[facility]   Hospitalist Progress Note  Date: 2024  Patient Name: Yehuda Weaver  : 1955  MRN: [id number]  Date of admission: 2024      Subjective   Subjective     Chief Complaint: Follow up for seizure    Summary: 68-year-old male with history of seizure in 2023 not on AEDs, HTN, HLD, peripheral vascular disease with prior stenting, COPD, CAD status post CABG, DVT on Eliquis who presented with altered mental status in setting of seizure.  Was postictal.  Tachycardic with metabolic acidosis and very high lactate.  Received Ativan, Keppra, IV fluids.  Ethanol/urine tox negative.  Lactate normalized.  CT head no acute abnormality.  MRI brain no acute abnormality.  Mentation much improved.  Uptitrated Keppra per teleneurology recommendation.  Awaiting EEG.    Interval Followup:   Afebrile.  Blood pressure controlled.  Weaned to room air  Feeling better.  Family at bedside states he is back to normal mentation.  Tolerating oral intake.  Getting up to bathroom with minimal assistance.  No respiratory complaints  No seizure activity reported    Review of Systems  Cardiovascular:  No Chest Pain  Respiratory:  No Cough, No Dyspnea  Gastrointestinal:  No Nausea, No Vomiting      Objective   Objective     Vitals:   Temp:  [97.9 °F (36.6 °C)-99 °F (37.2 °C)] 98.1 °F (36.7 °C)  Heart Rate:  [81-93] 84  Resp:  [18-20] 18  BP: (127-149)/(71-81) 149/80  Flow (L/min):  [1-4] 1  Physical Exam    Constitutional: conversant, NAD   Respiratory: clear, nonlabored respirations    Cardiovascular:  RRR, no edema   Gastrointestinal: soft, nontender, nondistended   Neurologic: Alert, CN grossly intact, speech clear   Skin: Extremities warm, no rashes    Result Review    Result Review:  I have personally reviewed the following over the last 24 hours (07:00 to 07:00) and agree with the following findings  [x]  Laboratory  CBC          2023    01:35 2024    01:10 2024    06:44 2024    04:10    CBC   WBC 12.46  12.18  12.32  14.05    RBC 4.70  4.68  4.33  4.29    Hemoglobin 13.8  13.7  12.8  12.7    Hematocrit 41.1  44.3  39.2  39.1    MCV 87.4  94.7  90.5  91.1    MCH 29.4  29.3  29.6  29.6    MCHC 33.6  30.9  32.7  32.5    RDW 13.4  14.5  14.6  15.0    Platelets 265  281  214  196      BMP          12/9/2023    01:35 4/21/2024    01:10 4/21/2024    01:13 4/21/2024    02:50 4/21/2024    06:44 4/22/2024    04:10   BMP   BUN 15  13    14  9    Creatinine 1.01  1.29    0.99  0.72    Sodium 132  137  137.9  135.2  135  135    Potassium 3.9  4.3    4.0  3.8    Chloride 95  94    99  100    CO2 21.4  14.0    24.1  25.3    Calcium 9.2  8.8    8.3  8.3      [x]  Microbiology  Strep and Legionella urine antigen negative  Blood cultures no growth  [x]  Radiology   MRI Brain With & Without Contrast    Result Date: 4/21/2024   MRI BRAIN W WO CONTRAST-  Date of Exam: 4/21/2024 11:29 AM  Indication: Generalised tonic clonic seizure; R56.9-Unspecified convulsions; E87.20-Acidosis, unspecified; E87.20-Acidosis, unspecified; R13.12-Dysphagia, oropharyngeal phase.  Comparison: 4/21/2024  Technique:  Routine multiplanar/multisequence sequence images of the brain were obtained before and after the uneventful administration of MultiHance 18 cc.   Findings: The ventricles are normal in size, position, and configuration. Sulci are not abnormally prominent.  Mild white matter signal abnormality is consistent with the patient's age.  No abnormal gray or white matter signal is evident. No abnormal bright signal is seen on diffusion weighted images. No restricted diffusion is evident.  No abnormal dark signal is seen on magnetic susceptibility sequence sensitive for blood products.  No abnormal patterns of enhancement are seen.  No abnormality of the pituitary or orbits is evident.  The paranasal sinuses are well aerated. No abnormal mastoid signal is seen.      Impression: MR examination of the brain without and with IV contrast  demonstrating no acute intracranial abnormality.    Electronically Signed By-HUNG OLEA MD On:4/21/2024 4:27 PM      CT Abdomen Pelvis Without Contrast    Result Date: 4/21/2024  CT ABDOMEN PELVIS WO CONTRAST-  Date of Exam: 4/21/2024 11:07 AM  Indication: Increasing abdominal distension since last 3 months, No Hx at this time as the patient is somnolent. Per family he didnt complain of any symptoms other than distension; R56.9-Unspecified convulsions; E87.20-Acidosis, unspecified; E87.20-Acidosis, unspecified; R13.12-Dysphagia, oropharyngeal phase.  Comparison: 6/15/2022  Technique: Axial CT images were obtained of the abdomen and pelvis without the administration of contrast. Reconstructed coronal and sagittal images were also obtained. Automated exposure control and iterative construction methods were used.   Findings: Patchy alveolar airspace disease is seen in the left lower lobe.  The liver is of normal size. The gallbladder is not abnormally distended. Solitary tiny stone is seen in the dependent portion of the gallbladder. No pancreatic or adrenal mass is evident. The spleen is of normal size.  Vascular calcifications are seen in the renal medulla bilaterally. 3 cm cyst in the midpole region of the left kidney is stable. There is no evidence of hydronephrosis. The urinary bladder is not abnormally distended. The prostate gland measures 5.5 cm in greatest transverse dimension.  Bowel loops are not abnormally dilated. A moderate amount of stool is seen throughout the colon. There are no findings of obstruction or impaction.  Small inguinal hernias containing fat. Moderate degenerative spurring is seen in the lower thoracic and lumbar spine.      Impression: CT scan of the abdomen and pelvis without contrast demonstrating cholelithiasis.  Small bilateral inguinal hernias containing fat.      Electronically Signed By-HUNG OLEA MD On:4/21/2024 1:46 PM      CT Head Without Contrast    Result Date:  4/21/2024  CT HEAD WO CONTRAST-  Date of Exam: 4/21/2024 1:50 AM  Indication: AMS (altered mental status).  Comparison: 12/9/2023.  Technique: Axial CT images were obtained of the head without contrast administration.  Reconstructed 2D coronal and sagittal images were also obtained. Automated exposure control and iterative construction methods were used.  FINDINGS: A routine nonenhanced head CT was performed. No acute brain abnormality is seen. No acute infarct. No acute intracranial hemorrhage. No midline shift or acute intracranial mass effect is seen. The ventricular system and the extra-axial spaces are prominent. There is suspected mild chronic small vessel ischemic disease. Arterial calcifications are seen. No acute skull fracture is identified. Nasal airway is in place from a right-sided approach. Benign external auditory canal debris is suspected. Mild age-indeterminate mucosal thickening involves the imaged paranasal sinuses.  No air-fluid interfaces are seen within the imaged paranasal sinuses.      No acute brain abnormality is appreciated.       Electronically Signed By-Ifeanyi Brice MD On:4/21/2024 2:06 AM      XR Chest 1 View    Result Date: 4/21/2024  XR CHEST 1 VW-  Date of Exam: 4/21/2024 1:10 AM  Indication: Weak/Dizzy/AMS triage protocol  Comparison: 12/29/2023; 1/18/2009.  Findings: A single AP semiupright portable view of the chest is provided for review. There may be new atelectasis and/or infiltrate(s) in the right lung base. Pneumonia cannot be excluded. There is probably no acute infiltrate on the left. There are low lung volumes. There is slight motion artifact on the study. External artifacts obscure detail. The patient has undergone median sternotomy and CABG surgery. The thoracic aorta is atherosclerotic. No cardiac enlargement. No definite pleural effusion. Chronic calcified granulomatous disease involves the chest.      There is possible new right basilar pneumonia. Consider imaging  follow-up to ensure a benign progression and to exclude an underlying malignant process.   Electronically Signed By-Ifeanyi Brice MD On:4/21/2024 1:37 AM       [x]  EKG/Telemetry monitor personally reviewed: NSR  []  Cardiology/Vascular   []  Pathology  []  Old records  [x]  Other:    Intake/Output Summary (Last 24 hours) at 4/22/2024 1507  Last data filed at 4/22/2024 1228  Gross per 24 hour   Intake 1523 ml   Output 1000 ml   Net 523 ml         Assessment & Plan   Assessment / Plan     Assessment/Plan:  Tonic-clonic seizure without status epilepticus  Clinically significant lactic acidosis secondary to seizures.  Resolved  Leukocytosis, likely reactive  Clinically insignificant hyponatremia  CAD status post CABG  Peripheral vascular disease with prior stenting  Hypertension  Hyperlipidemia  DVT on Eliquis  Non-insulin-dependent type 2 diabetes  Concern for undiagnosed JUDITH         Continue to monitor on telemetry floor  Teleneurology recommendations reviewed.  Appreciate assistance.  No further seizures and appears to be back to baseline from mentation standpoint  Awaiting EEG report  Continue Keppra 500 mg twice daily  Continue seizure precaution  Education on driving restriction  Has outpatient follow-up with neurology scheduled for 6/11/2024  Outpatient sleep study recommended.  Referral to sleep medicine placed  Lactate normalized.  Blood pressure trending up.  Now off IV fluids  Resume lisinopril 20 mg and HCTZ 25 mg.  Continue Toprol- mg daily  Continue aspirin 81 mg daily  Continue high intensity statin  Blood sugars controlled.  Continue Jardiance 10 mg daily.  Glimepiride/metformin on hold.  Continue Eliquis 5 mg 2 times daily  Carb consistent diet/thin liquid  Outpatient referral to sleep medicine  CBC, BMP in AM    Likely home on 4/23    Discussed plan with RN.    DVT prophylaxis:  Medical DVT prophylaxis orders are present.        CODE STATUS:   Level Of Support Discussed With: Health Care  Surrogate  Code Status (Patient has no pulse and is not breathing): CPR (Attempt to Resuscitate)  Medical Interventions (Patient has pulse or is breathing): Full Support        Electronically signed by Panda Mendosa DO, 04/22/24, 3:07 PM EDT.

## 2024-04-22 NOTE — CONSULTS
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, without status epilepticus (HCC)    Impression  Concern for second convulsion- has atrophy on MRI brain, suspect undiagnosed and untreated JUDITH that could also be trigger; EEG pending; adding b12/folate/RPR/vitaminD to check; okay to continue keppra 500mg po BID now; will see what EEG shows; if no evidence of status or normal will recommend increasing keppra to 750mg po BID and add vitamin b6 50mg daily for improving tolerance of medication; needs 30 day holter upon discharge, ASAP screen for JUDITH with his PCP and neurology f/u visit upon discharge- will f/u on EEG- please call for questions/concerns; NO DRIVING until seizure free x 6 months and cleared by neurology outpatient according to state specific driving restrictions.    Our recommendations are outlined below    Seizure precautions :  Seizure precautions including no driving for state mandated time frame were discussed with patient with clear understanding    Disposition :  Neurology will follow    ----------------------------------------------------------------------------------------------------    Imaging  MRI brain - no acute stroke however on my exam right parietal region more than left showing atrophy    Chief Complaint:  seizure    History of Present Illness:  Patient is a 68 year old Male.  69 yo male with h/o peripheral arterial disease mainly in left leg is here for seizure episode at home- prior to episode he was running into walls, very confused, had a convulsion. Jessica is here at bedside.    Had event of seizure out of sleep in December- no meds started, no neuro, no EEG performed at that time.    Never been  tested for JUDITH  Never h/o stroke    no new meds    significant other noting that he has been having memory issues    will fall asleep easily if he lays down on couch       Past Medical History:       Hypertension       There is no history of Stroke    Medications:    Anticoagulant use:  Unknown  Antiplatelet use: Unknown  Reviewed EMR for current medications    Allergies:   Reviewed    Social History:  Patient Is: Single  Alcohol Use: No  Drug Use: No    Family History:    There is no family history of premature cerebrovascular disease pertinent to this consultation    ROS :  14 Points Review of Systems was performed and was negative except mentioned in HPI.    Past Surgical History:  There Is No Surgical History Contributory To Today’s Visit    Examination  1A: Level of Consciousness - Alert; keenly responsive + 0  1B: Ask Month and Age - Both Questions Right + 0  1C: Blink Eyes & Squeeze Hands - Performs Both Tasks + 0  2: Test Horizontal Extraocular Movements - Normal + 0  3: Test Visual Fields - No Visual Loss + 0  4: Test Facial Palsy (Use Grimace if Obtunded) - Normal symmetry + 0  5A: Test Left Arm Motor Drift - No Drift for 10 Seconds + 0  5B: Test Right Arm Motor Drift - No Drift for 10 Seconds + 0  6A: Test Left Leg Motor Drift - No Drift for 5 Seconds + 0  6B: Test Right Leg Motor Drift - No Drift for 5 Seconds + 0  7: Test Limb Ataxia (FNF/Heel-Shin) - No Ataxia + 0  8: Test Sensation - Mild-Moderate Loss: Less Sharp/More Dull + 1  9: Test Language/Aphasia - Normal; No aphasia + 0  10: Test Dysarthria - Normal + 0  11: Test Extinction/Inattention - No abnormality + 0    NIHSS Score: 1  NIHSS Free Text : reduced sensation left leg           Patient/Family was informed the Neurology Consult would happen via TeleHealth consult by way of interactive audio and video telecommunications and consented to receiving care in this manner.    Telehealth Neurology consultation was provided. I spent 35 minutes  providing telehealth care. This includes time spent for face to face visit via telemedicine, review of medical records, imaging studies and discussion of findings with providers, the patient and/or family.      Dr Anel Alberts      [facility]  For Inpatient follow-up with [facility] physician please call [facility] [phone]. This is not an outpatient service. Post hospital discharge, please contact hospital directly.    Please do not communicate with [facility] physicians via secure chat. If you have any questions, Please contact [facility].  Please call or reconsult our service if there are any clinical or diagnostic changes.

## 2024-04-22 NOTE — PLAN OF CARE
Goal Outcome Evaluation:      Pt is alert and oriented with intermittent confusion.  Pt rested well with wife at bedside.  MANASA Myles S.RN

## 2024-04-22 NOTE — PLAN OF CARE
Goal Outcome Evaluation:                 No acute changes this shift. Seizure precautions in place. VSS.

## 2024-04-23 ENCOUNTER — READMISSION MANAGEMENT (OUTPATIENT)
Dept: CALL CENTER | Facility: HOSPITAL | Age: 69
End: 2024-04-23
Payer: MEDICARE

## 2024-04-23 VITALS
RESPIRATION RATE: 18 BRPM | HEART RATE: 87 BPM | WEIGHT: 196.21 LBS | HEIGHT: 67 IN | OXYGEN SATURATION: 93 % | BODY MASS INDEX: 30.8 KG/M2 | SYSTOLIC BLOOD PRESSURE: 148 MMHG | DIASTOLIC BLOOD PRESSURE: 84 MMHG | TEMPERATURE: 98.8 F

## 2024-04-23 LAB
25(OH)D3 SERPL-MCNC: 30.8 NG/ML (ref 30–100)
ANION GAP SERPL CALCULATED.3IONS-SCNC: 11.4 MMOL/L (ref 5–15)
BUN SERPL-MCNC: 11 MG/DL (ref 8–23)
BUN/CREAT SERPL: 12.4 (ref 7–25)
CALCIUM SPEC-SCNC: 9.2 MG/DL (ref 8.6–10.5)
CHLORIDE SERPL-SCNC: 96 MMOL/L (ref 98–107)
CO2 SERPL-SCNC: 27.6 MMOL/L (ref 22–29)
CREAT SERPL-MCNC: 0.89 MG/DL (ref 0.76–1.27)
DEPRECATED RDW RBC AUTO: 48.9 FL (ref 37–54)
EGFRCR SERPLBLD CKD-EPI 2021: 93.3 ML/MIN/1.73
ERYTHROCYTE [DISTWIDTH] IN BLOOD BY AUTOMATED COUNT: 14.8 % (ref 12.3–15.4)
GLUCOSE SERPL-MCNC: 109 MG/DL (ref 65–99)
HCT VFR BLD AUTO: 41 % (ref 37.5–51)
HGB BLD-MCNC: 13.4 G/DL (ref 13–17.7)
MCH RBC QN AUTO: 29.5 PG (ref 26.6–33)
MCHC RBC AUTO-ENTMCNC: 32.7 G/DL (ref 31.5–35.7)
MCV RBC AUTO: 90.3 FL (ref 79–97)
PLATELET # BLD AUTO: 193 10*3/MM3 (ref 140–450)
PMV BLD AUTO: 11 FL (ref 6–12)
POTASSIUM SERPL-SCNC: 3.8 MMOL/L (ref 3.5–5.2)
RBC # BLD AUTO: 4.54 10*6/MM3 (ref 4.14–5.8)
RPR SER-TITR: NON REACTIVE TITER
SODIUM SERPL-SCNC: 135 MMOL/L (ref 136–145)
WBC NRBC COR # BLD AUTO: 11.78 10*3/MM3 (ref 3.4–10.8)

## 2024-04-23 PROCEDURE — 99239 HOSP IP/OBS DSCHRG MGMT >30: CPT | Performed by: INTERNAL MEDICINE

## 2024-04-23 PROCEDURE — 85027 COMPLETE CBC AUTOMATED: CPT | Performed by: INTERNAL MEDICINE

## 2024-04-23 PROCEDURE — 99233 SBSQ HOSP IP/OBS HIGH 50: CPT | Performed by: PSYCHIATRY & NEUROLOGY

## 2024-04-23 PROCEDURE — 80048 BASIC METABOLIC PNL TOTAL CA: CPT | Performed by: INTERNAL MEDICINE

## 2024-04-23 PROCEDURE — 82306 VITAMIN D 25 HYDROXY: CPT | Performed by: PSYCHIATRY & NEUROLOGY

## 2024-04-23 RX ORDER — LEVETIRACETAM 750 MG/1
750 TABLET ORAL EVERY 12 HOURS SCHEDULED
Qty: 60 TABLET | Refills: 0 | Status: SHIPPED | OUTPATIENT
Start: 2024-04-23 | End: 2024-05-23

## 2024-04-23 RX ORDER — ALBUTEROL SULFATE 2.5 MG/3ML
2.5 SOLUTION RESPIRATORY (INHALATION) EVERY 6 HOURS PRN
Status: DISCONTINUED | OUTPATIENT
Start: 2024-04-23 | End: 2024-04-23 | Stop reason: HOSPADM

## 2024-04-23 RX ORDER — CETIRIZINE HYDROCHLORIDE 10 MG/1
10 TABLET ORAL DAILY
Status: DISCONTINUED | OUTPATIENT
Start: 2024-04-23 | End: 2024-04-23 | Stop reason: HOSPADM

## 2024-04-23 RX ORDER — DILTIAZEM HYDROCHLORIDE 240 MG/1
240 CAPSULE, COATED, EXTENDED RELEASE ORAL
Status: DISCONTINUED | OUTPATIENT
Start: 2024-04-23 | End: 2024-04-23 | Stop reason: HOSPADM

## 2024-04-23 RX ORDER — MULTIPLE VITAMINS W/ MINERALS TAB 9MG-400MCG
1 TAB ORAL DAILY
Status: DISCONTINUED | OUTPATIENT
Start: 2024-04-23 | End: 2024-04-23 | Stop reason: HOSPADM

## 2024-04-23 RX ORDER — DIPHENOXYLATE HYDROCHLORIDE AND ATROPINE SULFATE 2.5; .025 MG/1; MG/1
1 TABLET ORAL DAILY
Qty: 360 TABLET | Refills: 0 | Status: SHIPPED | OUTPATIENT
Start: 2024-04-23 | End: 2025-04-18

## 2024-04-23 RX ORDER — LANOLIN ALCOHOL/MO/W.PET/CERES
50 CREAM (GRAM) TOPICAL DAILY
Status: DISCONTINUED | OUTPATIENT
Start: 2024-04-23 | End: 2024-04-23 | Stop reason: HOSPADM

## 2024-04-23 RX ORDER — TERAZOSIN 5 MG/1
5 CAPSULE ORAL NIGHTLY
Status: DISCONTINUED | OUTPATIENT
Start: 2024-04-23 | End: 2024-04-23 | Stop reason: HOSPADM

## 2024-04-23 RX ORDER — LANOLIN ALCOHOL/MO/W.PET/CERES
50 CREAM (GRAM) TOPICAL DAILY
Qty: 60 TABLET | Refills: 0 | Status: SHIPPED | OUTPATIENT
Start: 2024-04-24 | End: 2024-06-23

## 2024-04-23 RX ORDER — LEVETIRACETAM 500 MG/1
750 TABLET ORAL EVERY 12 HOURS SCHEDULED
Status: DISCONTINUED | OUTPATIENT
Start: 2024-04-23 | End: 2024-04-23 | Stop reason: HOSPADM

## 2024-04-23 RX ADMIN — LISINOPRIL 20 MG: 20 TABLET ORAL at 09:33

## 2024-04-23 RX ADMIN — Medication 1 TABLET: at 09:34

## 2024-04-23 RX ADMIN — LEVETIRACETAM 750 MG: 500 TABLET, FILM COATED ORAL at 09:32

## 2024-04-23 RX ADMIN — METOPROLOL SUCCINATE 100 MG: 50 TABLET, EXTENDED RELEASE ORAL at 09:33

## 2024-04-23 RX ADMIN — EMPAGLIFLOZIN 10 MG: 10 TABLET, FILM COATED ORAL at 09:32

## 2024-04-23 RX ADMIN — HYDROCHLOROTHIAZIDE 25 MG: 25 TABLET ORAL at 09:33

## 2024-04-23 RX ADMIN — ASPIRIN 81 MG: 81 TABLET, COATED ORAL at 09:33

## 2024-04-23 RX ADMIN — PYRIDOXINE HCL TAB 50 MG 50 MG: 50 TAB at 09:33

## 2024-04-23 RX ADMIN — Medication 10 ML: at 09:34

## 2024-04-23 RX ADMIN — CETIRIZINE HYDROCHLORIDE 10 MG: 10 TABLET, FILM COATED ORAL at 09:32

## 2024-04-23 RX ADMIN — APIXABAN 5 MG: 5 TABLET, FILM COATED ORAL at 09:33

## 2024-04-23 RX ADMIN — PANTOPRAZOLE SODIUM 40 MG: 40 TABLET, DELAYED RELEASE ORAL at 05:17

## 2024-04-23 RX ADMIN — DILTIAZEM HYDROCHLORIDE 240 MG: 240 CAPSULE, COATED, EXTENDED RELEASE ORAL at 09:34

## 2024-04-23 NOTE — DISCHARGE SUMMARY
[facility]         HOSPITALIST  DISCHARGE SUMMARY    Patient Name: Yehdua Weaver    : 1955    MRN: [id number]    Date of Admission: 2024  Date of Discharge:  24  Primary Care Physician: Koby Hernandez MD    Consults       Date and Time Order Name Status Description    2024  6:40 AM Inpatient Neurology Consult General Completed     2024  3:33 AM Inpatient Hospitalist Consult              Final Diagnosis:  Tonic-clonic seizure without status epilepticus  Clinically significant lactic acidosis secondary to seizures.  Resolved  Leukocytosis, likely reactive from seizure, resolving  Acute Hypoxia in setting of postictal state requiring 4L NC   Hematuria, microscopic outpt urology f/u   Hx of smoking  Clinically insignificant hyponatremia, mild  CAD status post CABG  Peripheral vascular disease with prior stenting  Hypertension  Hyperlipidemia  DVT on Eliquis  Non-insulin-dependent type 2 diabetes  Concern for undiagnosed JUDITH    Hospital Course     Hospital Course:  68-year-old male with history of seizure in 2023 not on AEDs, HTN, HLD, peripheral vascular disease with prior stenting, COPD, CAD status post CABG, DVT on Eliquis who presented with altered mental status in setting of seizure.  Was postictal.  Tachycardic with metabolic acidosis and very high lactate.  Received Ativan, Keppra, IV fluids.  Ethanol/urine tox negative.  Lactate normalized.  CT head no acute abnormality.  MRI brain no acute abnormality.  Mentation much improved.  Uptitrated Keppra per teleneurology recommendation.  EEG without acute epileptiform discharges but likely postictal findings.      Keppra to 750mg po BID and add vitamin b6 50mg daily for discharge per neurology rec's, outpatient sleep referral in case of JUDITH contributing; however, no hypercapnia noted on initial abg and lower suspicion for this     Urology referral for microscopic hematuria, quit smoking ~13 months ago.  Is  on aspirin/apixaban currently.  Pt aware, referral placed       Patient discharged in stable condition with close PCP neuro, urology, sleep med, follow up. Return precautions and follow up discussed and patient voiced agreement and understanding of treatment plan.     DISCHARGE Follow Up Recommendations for labs and diagnostics:   As above    CODE STATUS:  Code Status and Medical Interventions:   Ordered at: 04/21/24 0458     Level Of Support Discussed With:    Health Care Surrogate     Code Status (Patient has no pulse and is not breathing):    CPR (Attempt to Resuscitate)     Medical Interventions (Patient has pulse or is breathing):    Full Support           Day of Discharge     Vital Signs:  Temp:  [97.9 °F (36.6 °C)-99.1 °F (37.3 °C)] 98.8 °F (37.1 °C)  Heart Rate:  [73-90] 87  Resp:  [16-18] 18  BP: (138-166)/(79-84) 148/84  Flow (L/min):  [1] 1    Physical Exam  Gen: awake, resting in bed, conversant  Resp: CTAB, normal respiratory effort  CV: RRR, no LE pitting edema      Discharge Details        Discharge Medications        New Medications        Instructions Start Date   cholecalciferol 250 MCG (28719 UT) capsule  Commonly known as: VITAMIN D3   10,000 Units, Oral, Daily      levETIRAcetam 750 MG tablet  Commonly known as: KEPPRA   750 mg, Oral, Every 12 Hours Scheduled      vitamin B-6 50 MG tablet  Commonly known as: PYRIDOXINE   50 mg, Oral, Daily   Start Date: April 24, 2024            Continue These Medications        Instructions Start Date   albuterol (2.5 MG/3ML) 0.083% nebulizer solution  Commonly known as: PROVENTIL   2.5 mg, Nebulization, Every 4 Hours PRN      albuterol sulfate  (90 Base) MCG/ACT inhaler  Commonly known as: PROVENTIL HFA;VENTOLIN HFA;PROAIR HFA   2 puffs, Inhalation, Every 4 Hours PRN      Aspirin Low Dose 81 MG EC tablet  Generic drug: aspirin   81 mg, Oral, Daily      atorvastatin 80 MG tablet  Commonly known as: LIPITOR   80 mg, Oral, Nightly      cetirizine 10 MG  tablet  Commonly known as: zyrTEC   10 mg, Oral, Daily      dilTIAZem  MG 24 hr capsule  Commonly known as: DILACOR XR   240 mg, Oral, Daily      doxazosin 4 MG tablet  Commonly known as: CARDURA   4 mg, Oral, Nightly      Eliquis 5 MG tablet tablet  Generic drug: apixaban   5 mg, Oral, 2 Times Daily      FeroSul 325 (65 Fe) MG tablet  Generic drug: ferrous sulfate   1 tablet, Oral, Daily      fluticasone 50 MCG/ACT nasal spray  Commonly known as: FLONASE   1 spray, Nasal, Nightly      Jardiance 10 MG tablet tablet  Generic drug: empagliflozin   1 tablet, Oral, Daily      lisinopril-hydrochlorothiazide 20-25 MG per tablet  Commonly known as: PRINZIDE,ZESTORETIC   1 tablet, Oral, Daily      metFORMIN  MG 24 hr tablet  Commonly known as: GLUCOPHAGE-XR   2 tablets, Oral, Every 12 Hours Scheduled      metoprolol succinate  MG 24 hr tablet  Commonly known as: TOPROL-XL   100 mg, Oral, Daily      montelukast 10 MG tablet  Commonly known as: SINGULAIR   10 mg, Oral, Nightly      multivitamin tablet tablet   1 tablet, Oral, Daily      omeprazole 40 MG capsule  Commonly known as: priLOSEC   Take 1 capsule by mouth Daily.             Stop These Medications      glimepiride 2 MG tablet  Commonly known as: AMARYL                Discharge Disposition:  Home or Self Care    Diet: patient counseled on dietary changes made during hospital     Discharge Activity: advance as tolerated      Additional Instructions for the Follow-ups that You Need to Schedule       Ambulatory Referral to Sleep Medicine   As directed      Follow-up needed: Yes        Discharge Follow-up with PCP   As directed       Currently Documented PCP:    Koby Hernandez MD    PCP Phone Number:    [phone]     Follow Up Details: 3-5 days hospital f/u                Pertinent  and/or Most Recent Results       LAB RESULTS:      Lab 04/23/24  0417 04/22/24  0410 04/21/24  1557 04/21/24  1259 04/21/24  0644 04/21/24  0250 04/21/24  0113  04/21/24  0110   WBC 11.78* 14.05*  --   --  12.32*  --   --  12.18*   HEMOGLOBIN 13.4 12.7*  --   --  12.8*  --   --  13.7   HEMATOCRIT 41.0 39.1  --   --  39.2  --   --  44.3   PLATELETS 193 196  --   --  214  --   --  281   NEUTROS ABS  --   --   --   --  9.64*  --   --  6.17   IMMATURE GRANS (ABS)  --   --   --   --  0.16*  --   --  0.06*   LYMPHS ABS  --   --   --   --  1.47  --   --  4.37*   MONOS ABS  --   --   --   --  1.01*  --   --  1.15*   EOS ABS  --   --   --   --  0.02  --   --  0.36   MCV 90.3 91.1  --   --  90.5  --   --  94.7   PROCALCITONIN  --   --   --   --  0.06  --   --   --    LACTATE  --  1.1 1.6 2.4* 2.5*  --    < >  --    LACTATE, ARTERIAL  --   --   --   --   --  5.07*   < >  --     < > = values in this interval not displayed.         Lab 04/23/24  0417 04/22/24  0410 04/21/24  0644 04/21/24  0250 04/21/24  0113 04/21/24  0110   SODIUM 135* 135* 135*  --   --  137   SODIUM, ARTERIAL  --   --   --  135.2* 137.9  --    POTASSIUM 3.8 3.8 4.0  --   --  4.3   CHLORIDE 96* 100 99  --   --  94*   CO2 27.6 25.3 24.1  --   --  14.0*   ANION GAP 11.4 9.7 11.9  --   --  29.0*   BUN 11 9 14  --   --  13   CREATININE 0.89 0.72* 0.99  --   --  1.29*   EGFR 93.3 99.5 83.0  --   --  60.4   GLUCOSE 109* 137* 95  --   --  253*   GLUCOSE, ARTERIAL  --   --   --  193* 250*  --    CALCIUM 9.2 8.3* 8.3*  --   --  8.8   IONIZED CALCIUM  --   --   --  1.16 1.16  --    MAGNESIUM  --   --  2.1  --   --  2.1   PHOSPHORUS  --   --  2.0*  --   --   --          Lab 04/21/24  0644 04/21/24  0110   TOTAL PROTEIN 5.9* 7.2   ALBUMIN 3.8 4.3   GLOBULIN 2.1 2.9   ALT (SGPT) 39 45*   AST (SGOT) 34 27   BILIRUBIN 0.2 <0.2   ALK PHOS 53 63         Lab 04/21/24  0110   PROBNP 447.7   HSTROP T 25*             Lab 04/21/24  0110   VITAMIN B 12 363         Lab 04/21/24  0250 04/21/24  0113   PH, ARTERIAL 7.289* 7.088*   PCO2, ARTERIAL 41.9 39.6   PO2 ART 66.7* 92.5   O2 SATURATION ART 89.6* 92.8*   FIO2 36 40   HCO3 ART 19.6* 11.7*    BASE EXCESS ART -6.6* -17.5*   CARBOXYHEMOGLOBIN 0.6 0.2     Brief Urine Lab Results  (Last result in the past 365 days)        Color   Clarity   Blood   Leuk Est   Nitrite   Protein   CREAT   Urine HCG        04/21/24 0119 Yellow   Clear   Moderate (2+)   Trace   Negative   100 mg/dL (2+)                 Microbiology Results (last 10 days)       Procedure Component Value - Date/Time    Blood Culture - Blood, Arm, Left [[id number]]  (Normal) Collected: 04/21/24 0201    Lab Status: Preliminary result Specimen: Blood from Arm, Left Updated: 04/23/24 0215     Blood Culture No growth at 2 days    Blood Culture - Blood, Arm, Right [[id number]]  (Normal) Collected: 04/21/24 0201    Lab Status: Preliminary result Specimen: Blood from Arm, Right Updated: 04/23/24 0215     Blood Culture No growth at 2 days    S. Pneumo Ag Urine or CSF - Urine, Urine, Clean Catch [[id number]]  (Normal) Collected: 04/21/24 0119    Lab Status: Final result Specimen: Urine, Clean Catch Updated: 04/21/24 1354     Strep Pneumo Ag Negative    Legionella Antigen, Urine - Urine, Urine, Clean Catch [[id number]]  (Normal) Collected: 04/21/24 0119    Lab Status: Final result Specimen: Urine, Clean Catch Updated: 04/21/24 1354     LEGIONELLA ANTIGEN, URINE Negative            RADIOLOGY:    MRI Brain With & Without Contrast    Result Date: 4/21/2024  Impression: Impression: MR examination of the brain without and with IV contrast demonstrating no acute intracranial abnormality.    Electronically Signed ByJONES OLEA MD On:4/21/2024 4:27 PM      CT Abdomen Pelvis Without Contrast    Result Date: 4/21/2024  Impression: Impression: CT scan of the abdomen and pelvis without contrast demonstrating cholelithiasis.  Small bilateral inguinal hernias containing fat.      Electronically Signed ByJONES OLEA MD On:4/21/2024 1:46 PM      CT Head Without Contrast    Result Date: 4/21/2024  Impression: No acute brain abnormality is appreciated.        Electronically Signed By-Ifeanyi Brice MD On:4/21/2024 2:06 AM      XR Chest 1 View    Result Date: 4/21/2024  Impression: There is possible new right basilar pneumonia. Consider imaging follow-up to ensure a benign progression and to exclude an underlying malignant process.   Electronically Signed By-Ifeanyi Brice MD On:4/21/2024 1:37 AM                   Labs Pending at Discharge:  Pending Labs       Order Current Status    RPR Quant In process    Vitamin B6 In process    Blood Culture - Blood, Arm, Left Preliminary result    Blood Culture - Blood, Arm, Right Preliminary result              Time spent on Discharge including face to face service:  40 minutes

## 2024-04-23 NOTE — DISCHARGE INSTRUCTIONS
Do not drive until you are cleared by your neurologist (at least 3 months without any driving)    Stop taking glimepiride for now.  Your blood glucose may be getting too low with this.  Follow up with your primary care doctor to recheck on your diabetes and dosing.

## 2024-04-23 NOTE — PROGRESS NOTES
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, without status epilepticus (HCC)    Impression  Concern for second convulsion- has atrophy on MRI brain, suspect undiagnosed and untreated JUDITH that could also be trigger; EEG pending; adding b12/folate/RPR/vitaminD to check; okay to continue keppra 500mg po BID now; will see what EEG shows; if no evidence of status or normal will recommend increasing keppra to 750mg po BID and add vitamin b6 50mg daily for improving tolerance of medication; needs 30 day holter upon discharge, ASAP screen for JUDITH with his PCP and neurology f/u visit upon discharge- will f/u on EEG- please call for questions/concerns; NO DRIVING until seizure free x 6 months and cleared by neurology outpatient according to state specific driving restrictions.    4/23/24: He is improved; Agree with above- EEG was abnormal but without IEDs, clearly with dysfunction; will be paramount for him to be screened for UJDITH ASAP upon discharge and treat aggressively even if mild disease, consider 72 hour ambulatory EEG with outpatient neurology, titrate keppra up to 750mg po BID in 3-5 days for full therapeutic dose and add b6 50mg daily to aid in tolerance of medication; his b12 is on lower end- recommend PCP outpatient to check fasting homocysteine/MTHFR snps and place him on sublingual methylated b12 and b9 (can get OTC). We will sign off- he appreciates the no driving restrictions.    Our recommendations are outlined below    Seizure precautions :  Seizure precautions including no driving for state mandated time frame were discussed with patient with clear understanding    Disposition :  Neurology will sign off.  Reconsult if Needed.    Subjective  no events, sore all over, feeling better and more clear however    Imaging  MRI brain - no acute stroke however on my exam right parietal region more than left showing atrophy    EEG:  Impression:  Abnormal EEG because of:  1. Intermittent and independent medium voltage focal slow activity noted on either frontotemporal region suggestive of neuronal dysfunction in this regions, left greater than right. This abnormalities have been reported in individuals with vascular insufficiency, migraine, or postictal states.  2. Intermittent medium voltage slow activity which were generalized and synchronous more prominent on both anterior quadrants suggestive of mild and diffuse encephalopathy.  3. There were no epileptiform discharges noted today.  4. Neuroradiographic imaging may be of some help to rule out pathology in both sides of the brain more on the left.    Labs  b12 in the 300's  b6/D/RPR pending       Examination  1A: Level of Consciousness - Alert; keenly responsive + 0  1B: Ask Month and Age - Both Questions Right + 0  1C: Blink Eyes & Squeeze Hands - Performs Both Tasks + 0  2: Test Horizontal Extraocular Movements - Normal + 0  3: Test Visual Fields - No Visual Loss + 0  4: Test Facial Palsy (Use Grimace if Obtunded) - Normal symmetry + 0  5A: Test Left Arm Motor Drift - No Drift for 10 Seconds + 0  5B: Test Right Arm Motor Drift - No Drift for 10 Seconds + 0  6A: Test Left Leg Motor Drift - No Drift for 5 Seconds + 0  6B: Test Right Leg Motor Drift - No Drift for 5 Seconds + 0  7: Test Limb Ataxia (FNF/Heel-Shin) - No Ataxia + 0  8: Test Sensation - Mild-Moderate Loss: Less Sharp/More Dull + 1  9: Test Language/Aphasia - Normal; No aphasia + 0  10: Test Dysarthria - Normal + 0  11: Test Extinction/Inattention - No abnormality + 0    NIHSS Score: 0  NIHSS Free Text : reduced sensation left leg           Patient/Family was informed the Neurology Consult would happen via  TeleHealth consult by way of interactive audio and video telecommunications and consented to receiving care in this manner.    Telehealth Neurology consultation was provided. I spent 30 minutes providing telehealth care. This includes time spent for face to face visit via telemedicine, review of medical records, imaging studies and discussion of findings with providers, the patient and/or family.      Dr Anel Alberts      [facility]  For Inpatient follow-up with [facility] physician please call [facility] [phone]. This is not an outpatient service. Post hospital discharge, please contact hospital directly.    Please do not communicate with [facility] physicians via secure chat. If you have any questions, Please contact [facility].  Please call or reconsult our service if there are any clinical or diagnostic changes.

## 2024-04-23 NOTE — PLAN OF CARE
Goal Outcome Evaluation:      Pt is alert and oriented.  Pt rested well with wife at bedside and no complaints.  MANASA ADHIKARI RN

## 2024-04-24 LAB — PYRIDOXAL PHOS SERPL-MCNC: 7.2 UG/L (ref 3.4–65.2)

## 2024-04-24 NOTE — OUTREACH NOTE
Prep Survey      Flowsheet Row Responses   [facility] facility patient discharged from? [facility]   Is LACE score < 7 ? No   Eligibility Readm Mgmt   Discharge diagnosis Seizure   Does the patient have one of the following disease processes/diagnoses(primary or secondary)? Other   Does the patient have Home health ordered? No   Is there a DME ordered? No   Prep survey completed? Yes            ALEX GALVAN - Registered Nurse

## 2024-04-26 LAB
BACTERIA SPEC AEROBE CULT: NORMAL
BACTERIA SPEC AEROBE CULT: NORMAL
QT INTERVAL: 338 MS
QTC INTERVAL: 492 MS

## 2024-04-29 ENCOUNTER — READMISSION MANAGEMENT (OUTPATIENT)
Dept: CALL CENTER | Facility: HOSPITAL | Age: 69
End: 2024-04-29
Payer: MEDICARE

## 2024-04-29 NOTE — OUTREACH NOTE
Medical Week 1 Survey      Flowsheet Row Responses   [facility] patient discharged from? [facility]   Does the patient have one of the following disease processes/diagnoses(primary or secondary)? Other   Week 1 attempt successful? Yes   Call start time 1118   Call end time 1126   Discharge diagnosis Seizure   Meds reviewed with patient/caregiver? Yes   Is the patient having any side effects they believe may be caused by any medication additions or changes? No   Does the patient have all medications ordered at discharge? Yes   Is the patient taking all medications as directed (includes completed medication regime)? Yes   Does the patient have a primary care provider?  Yes   Does the patient have an appointment with their PCP within 7 days of discharge? Greater than 7 days   What is preventing the patient from scheduling follow up appointments within 7 days of discharge? Earlier appointment not available   Has the patient kept scheduled appointments due by today? N/A   Comments Pt reports no further seizure activity since DC. Pt reports AMS is at baseline except he remains slow to respond verbally intermittently, his responses are clear and appropriate, pt reports. Pt denies hematuria/dysuria or other issues at this time with output or intake. Pt does report decreased stamina since DC.   Did the patient receive a copy of their discharge instructions? Yes   Nursing interventions Reviewed instructions with patient   What is the patient's perception of their health status since discharge? Improving   Is the patient/caregiver able to teach back signs and symptoms related to disease process for when to call PCP? Yes   Is the patient/caregiver able to teach back signs and symptoms related to disease process for when to call 911? Yes   Is the patient/caregiver able to teach back the hierarchy of who to call/visit for symptoms/problems? PCP, Specialist, Home health nurse, Urgent Care, ED, 911 Yes   If the patient is a  current smoker, are they able to teach back resources for cessation? Not a smoker   Week 1 call completed? Yes   Revoked No further contact(revokes)-requires comment   Call end time 1126            Damari PLUMMER - Registered Nurse

## 2024-05-10 ENCOUNTER — HOSPITAL ENCOUNTER (OUTPATIENT)
Dept: SLEEP MEDICINE | Facility: HOSPITAL | Age: 69
End: 2024-05-10
Payer: MEDICARE

## 2024-05-10 ENCOUNTER — OFFICE VISIT (OUTPATIENT)
Dept: SLEEP MEDICINE | Facility: HOSPITAL | Age: 69
End: 2024-05-10
Payer: MEDICARE

## 2024-05-10 VITALS
DIASTOLIC BLOOD PRESSURE: 69 MMHG | SYSTOLIC BLOOD PRESSURE: 113 MMHG | WEIGHT: 195.7 LBS | HEIGHT: 67 IN | OXYGEN SATURATION: 96 % | BODY MASS INDEX: 30.72 KG/M2 | HEART RATE: 63 BPM

## 2024-05-10 DIAGNOSIS — G47.33 OSA (OBSTRUCTIVE SLEEP APNEA): Primary | ICD-10-CM

## 2024-05-10 DIAGNOSIS — G47.33 OSA (OBSTRUCTIVE SLEEP APNEA): ICD-10-CM

## 2024-05-10 PROCEDURE — 3074F SYST BP LT 130 MM HG: CPT | Performed by: INTERNAL MEDICINE

## 2024-05-10 PROCEDURE — G0463 HOSPITAL OUTPT CLINIC VISIT: HCPCS

## 2024-05-10 PROCEDURE — 95810 POLYSOM 6/> YRS 4/> PARAM: CPT

## 2024-05-10 PROCEDURE — 3078F DIAST BP <80 MM HG: CPT | Performed by: INTERNAL MEDICINE

## 2024-05-21 ENCOUNTER — TELEPHONE (OUTPATIENT)
Dept: SLEEP MEDICINE | Facility: HOSPITAL | Age: 69
End: 2024-05-21
Payer: MEDICARE

## 2024-05-31 NOTE — PROGRESS NOTES
Chief Complaint  No chief complaint on file.    Subjective        Yehuda Weaver presents to [facility] VASCULAR SURGERY  History of Present IllnessThe patient has had multiple revascularizations of the lower extremities bilaterally with recurrent failure on the left with short distance claudication and rest pain. On February 3, 2023, he underwent left femoral to   tibioperoneal trunk bypass with in situ saphenous vein and tibioperoneal trunk endarterectomy. Previously, on June 25,2021, he had right superficial femoral artery angioplasty with drug-eluting stent placement with recurrent claudication symptoms and in-stent stenosis, and had right superficial femoral artery atherectomy and repeat angioplasty and drug-eluting stent placement on March 11, 2022. He had arteriography with angioplasty of the distal segment of the vein graft and his outflow tract for 2 procedures peroneal artery on July 10, 2023. He was seen on November 8, 2023 with left lower extremity duplex scan and ABIs. The left lower extremity bypass was patent without stenosis. Velocities distally were all satisfactory. ABIs were 0.66 on the right and 0.5 on the left which were unchanged on the right and mildly improved on the left. He remains on aspirin, Eliquis, and Crestor. He quit smoking at the time of his left leg bypass and remains free of tobacco use. He was seen on March 6, 2024 with repeat studies. He has a patent graft with no significant stenosis. Outflow vessel velocities were decreased but ABIs were unchanged.  He returned on June 5, 2024 with repeat studies.  This demonstrated a patent bypass graft but significant stenotic changes noted distally in the graft and the outflow artery.  Velocities were markedly diminished with near occlusive flow.  ABIs were normal on the right and 0.48 on the left.    Past History:  Medical History: has a past medical history of Anticoagulated, Arthritis, Asthma, Atherosclerosis of native  arteries of extremities with intermittent claudication, bilateral legs (01/22/2020), Bladder cancer (2004), CAD (coronary artery disease) (01/22/2020), COPD (chronic obstructive pulmonary disease) (01/22/2020), Coronary artery disease, Diabetes mellitus, Diverticulitis, DVT OF PROXIMAL LOWER LIMB (06/2022), GERD (gastroesophageal reflux disease), Heart attack (2007), History of COVID-19 (09/2022), colonic polyps, Hyperlipidemia (01/22/2020), Hypertension (01/22/2020), Long term (current) use of anticoagulants (08/19/2022), Onychomycosis, PVD (peripheral vascular disease), Seizures, Slow to wake up after anesthesia, and Tobacco use (01/22/2020).   Surgical History: has a past surgical history that includes Back surgery; Hernia repair; Coronary artery bypass graft (2007); Upper gastrointestinal endoscopy (07/22/2020); Colonoscopy (07/22/2020); Vascular surgery (Right, 2022); EKOS Catheter Placement (Right, 06/16/2022); EKOS Catheter Removal (N/A, 06/17/2022); Bladder surgery (2004); Appendectomy (1970); Leg Thrombectomy/Embolectomy (Right, 06/17/2022); Femoral artery stent (Right, 06/17/2022); Athrectomy Iliac, Femoral, Tibial Artery (Left, 10/20/2022); femoral popliteal bypass (Left, 02/02/2023); THORACOSCOPY WITH BIOPSY (Left, 07/10/2023); Neck surgery; Peripheral arterial stent graft (06/25/2021); and Angioplasty (Right, 06/17/2022).   Family History: family history includes Cancer in his son; Clotting disorder in his father and sister; Diabetes in his mother; Heart attack in his father; Heart disease in his mother; Heart failure in his mother; Lung cancer in his brother; Sleep apnea in his sister; Stroke in his brother.   Social History: reports that he quit smoking about 15 months ago. His smoking use included cigarettes. He started smoking about 55 years ago. He has a 27 pack-year smoking history. He has never used smokeless tobacco. He reports that he does not currently use alcohol. He reports that he does not  "use drugs.    (Not in a hospital admission)     Allergies: Penicillins   Objective   Vital Signs:  There were no vitals taken for this visit.  Estimated body mass index is 30.65 kg/m² as calculated from the following:    Height as of 5/10/24: 170.2 cm (67\").    Weight as of 5/10/24: 88.8 kg (195 lb 11.2 oz).         Yehuda Weaver  reports that he quit smoking about 15 months ago. His smoking use included cigarettes. He started smoking about 55 years ago. He has a 27 pack-year smoking history. He has never used smokeless tobacco.          Physical Exam   Result Review :    The following data was reviewed by: Donna Bean Jr., MD on 06/05/2024:    Carotid scan and ABIs on June 5, 2024, findings as described above.             Assessment and Plan     Diagnoses and all orders for this visit:    1. Atherosclerosis of native arteries of extremities with intermittent claudication, bilateral legs (Primary)    2. Coronary artery disease involving native coronary artery of native heart without angina pectoris    3. Mixed hyperlipidemia    4. Primary hypertension    5. Chronic obstructive pulmonary disease, unspecified COPD type    6. 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 Arterial stent thrombosis, subsequent encounter    Once again, it appears that he has had significant stenosis in his distal graft and his outflow artery.  He is at elevated risk of graft occlusion and with this elevated risk of limb loss.  I have recommended repeat arteriography with attempts at endovascular intervention with angioplasty.  He understands that if he were to get restenosis following this that he would likely need revision of his bypass graft with arm vein, jumping down to the more distal artery.  This was discussed in detail with the patient and his family members.  They would like to proceed with arteriography and endovascular intervention at this time.  This will be set up for them in the very " near future.         Follow Up     No follow-ups on file.  Patient was given instructions and counseling regarding his condition or for health maintenance advice. Please see specific information pulled into the AVS if appropriate.

## 2024-06-05 ENCOUNTER — HOSPITAL ENCOUNTER (OUTPATIENT)
Facility: HOSPITAL | Age: 69
Discharge: HOME | End: 2024-06-05
Payer: MEDICARE

## 2024-06-05 ENCOUNTER — OFFICE VISIT (OUTPATIENT)
Age: 69
End: 2024-06-05
Payer: MEDICARE

## 2024-06-05 ENCOUNTER — PREP FOR SURGERY (OUTPATIENT)
Dept: OTHER | Facility: HOSPITAL | Age: 69
End: 2024-06-05
Payer: MEDICARE

## 2024-06-05 VITALS
DIASTOLIC BLOOD PRESSURE: 70 MMHG | SYSTOLIC BLOOD PRESSURE: 134 MMHG | HEIGHT: 67 IN | WEIGHT: 194 LBS | BODY MASS INDEX: 30.45 KG/M2

## 2024-06-05 DIAGNOSIS — I73.9 PERIPHERAL ARTERIAL DISEASE: ICD-10-CM

## 2024-06-05 DIAGNOSIS — I10 PRIMARY HYPERTENSION: ICD-10-CM

## 2024-06-05 DIAGNOSIS — I25.10 CORONARY ARTERY DISEASE INVOLVING NATIVE CORONARY ARTERY OF NATIVE HEART WITHOUT ANGINA PECTORIS: ICD-10-CM

## 2024-06-05 DIAGNOSIS — I70.213 ATHEROSCLEROSIS OF NATIVE ARTERIES OF EXTREMITIES WITH INTERMITTENT CLAUDICATION, BILATERAL LEGS: Primary | Chronic | ICD-10-CM

## 2024-06-05 DIAGNOSIS — I70.312 ATHEROSCLEROSIS OF BYPASS GRAFT OF LEFT LOWER EXTREMITY WITH INTERMITTENT CLAUDICATION: Primary | ICD-10-CM

## 2024-06-05 DIAGNOSIS — I70.312 ATHEROSCLEROSIS OF BYPASS GRAFT OF LEFT LOWER EXTREMITY WITH INTERMITTENT CLAUDICATION: ICD-10-CM

## 2024-06-05 DIAGNOSIS — Z95.828 STATUS POST FEMORAL-POPLITEAL BYPASS SURGERY: ICD-10-CM

## 2024-06-05 DIAGNOSIS — E78.2 MIXED HYPERLIPIDEMIA: ICD-10-CM

## 2024-06-05 DIAGNOSIS — T82.868D ARTERIAL STENT THROMBOSIS, SUBSEQUENT ENCOUNTER: ICD-10-CM

## 2024-06-05 DIAGNOSIS — T85.828D: ICD-10-CM

## 2024-06-05 DIAGNOSIS — J44.9 CHRONIC OBSTRUCTIVE PULMONARY DISEASE, UNSPECIFIED COPD TYPE: ICD-10-CM

## 2024-06-05 LAB
BH CV GRAFT 1 - DISTAL ANASTAMOSIS PSV-LEFT: 16 CM/S
BH CV GRAFT 1 - DISTAL GRAFT PSV-LEFT: 17 CM/S
BH CV GRAFT 1 - INFLOW ARTERY PSV- LEFT: 175 CM/S
BH CV GRAFT 1 - MID DISTAL GRAFT PSV-LEFT: 15 CM/S
BH CV GRAFT 1 - MID GRAFT PSV-LEFT: 39 CM/S
BH CV GRAFT 1 - OUTFLOW ARTERY PSV-LEFT: 35 CM/S
BH CV GRAFT 1 - PROX GRAFT PSV-LEFT: 31 CM/S
BH CV GRAFT 1 - PROX MID GRAFT PSV-LEFT: 19 CM/S
BH CV GRAFT 1 - PROXIMAL ANASTAMOSIS PSV-LEFT: 127 CM/S
BH CV GRAFT 1- DISTAL ANASTAMOSIS EDV-LEFT: 5 CM/S
BH CV GRAFT 1- DISTAL GRAFT EDV-LEFT: 4 CM/S
BH CV GRAFT 1- INFLOW ARTERY EDV- LEFT: 15 CM/S
BH CV GRAFT 1- MID DISTAL GRAFT EDV-LEFT: 5 CM/S
BH CV GRAFT 1- OUTFLOW ARTERY EDV-LEFT: 10 CM/S
BH CV LOWER ARTERIAL LEFT ABI RATIO: 0.48
BH CV LOWER ARTERIAL LEFT DORSALIS PEDIS SYS MAX: 50
BH CV LOWER ARTERIAL LEFT POST TIBIAL SYS MAX: 64
BH CV LOWER ARTERIAL RIGHT ABI RATIO: 1.08
BH CV LOWER ARTERIAL RIGHT DORSALIS PEDIS SYS MAX: 124
BH CV LOWER ARTERIAL RIGHT POST TIBIAL SYS MAX: 144
BH CV VAS LEA LEFT HIDDEN GRAFT ALERT: 33
UPPER ARTERIAL LEFT ARM BRACHIAL SYS MAX: NORMAL
UPPER ARTERIAL RIGHT ARM BRACHIAL SYS MAX: NORMAL

## 2024-06-05 PROCEDURE — 93922 UPR/L XTREMITY ART 2 LEVELS: CPT

## 2024-06-05 PROCEDURE — 93926 LOWER EXTREMITY STUDY: CPT

## 2024-06-07 ENCOUNTER — PRE-ADMISSION TESTING (OUTPATIENT)
Dept: PREADMISSION TESTING | Facility: HOSPITAL | Age: 69
End: 2024-06-07
Payer: MEDICARE

## 2024-06-07 VITALS
SYSTOLIC BLOOD PRESSURE: 122 MMHG | BODY MASS INDEX: 31.13 KG/M2 | OXYGEN SATURATION: 96 % | HEIGHT: 66 IN | RESPIRATION RATE: 18 BRPM | WEIGHT: 193.7 LBS | TEMPERATURE: 97 F | HEART RATE: 60 BPM | DIASTOLIC BLOOD PRESSURE: 75 MMHG

## 2024-06-07 DIAGNOSIS — I70.312 ATHEROSCLEROSIS OF BYPASS GRAFT OF LEFT LOWER EXTREMITY WITH INTERMITTENT CLAUDICATION: ICD-10-CM

## 2024-06-07 LAB
ANION GAP SERPL CALCULATED.3IONS-SCNC: 13.3 MMOL/L (ref 5–15)
BILIRUB UR QL STRIP: NEGATIVE
BUN SERPL-MCNC: 14 MG/DL (ref 8–23)
BUN/CREAT SERPL: 14.9 (ref 7–25)
CALCIUM SPEC-SCNC: 9.8 MG/DL (ref 8.6–10.5)
CHLORIDE SERPL-SCNC: 99 MMOL/L (ref 98–107)
CLARITY UR: CLEAR
CO2 SERPL-SCNC: 27.7 MMOL/L (ref 22–29)
COLOR UR: YELLOW
CREAT SERPL-MCNC: 0.94 MG/DL (ref 0.76–1.27)
DEPRECATED RDW RBC AUTO: 43.5 FL (ref 37–54)
EGFRCR SERPLBLD CKD-EPI 2021: 87.8 ML/MIN/1.73
ERYTHROCYTE [DISTWIDTH] IN BLOOD BY AUTOMATED COUNT: 13.3 % (ref 12.3–15.4)
GLUCOSE SERPL-MCNC: 146 MG/DL (ref 65–99)
GLUCOSE UR STRIP-MCNC: NEGATIVE MG/DL
HCT VFR BLD AUTO: 45.1 % (ref 37.5–51)
HGB BLD-MCNC: 14.8 G/DL (ref 13–17.7)
HGB UR QL STRIP.AUTO: NEGATIVE
INR PPP: 1.17 (ref 0.9–1.1)
KETONES UR QL STRIP: NEGATIVE
LEUKOCYTE ESTERASE UR QL STRIP.AUTO: ABNORMAL
MCH RBC QN AUTO: 29.6 PG (ref 26.6–33)
MCHC RBC AUTO-ENTMCNC: 32.8 G/DL (ref 31.5–35.7)
MCV RBC AUTO: 90.2 FL (ref 79–97)
NITRITE UR QL STRIP: NEGATIVE
PH UR STRIP.AUTO: 5.5 [PH] (ref 5–8)
PLATELET # BLD AUTO: 203 10*3/MM3 (ref 140–450)
PMV BLD AUTO: 11.1 FL (ref 6–12)
POTASSIUM SERPL-SCNC: 5.2 MMOL/L (ref 3.5–5.2)
PROT UR QL STRIP: NEGATIVE
PROTHROMBIN TIME: 15.1 SECONDS (ref 11.7–14.2)
RBC # BLD AUTO: 5 10*6/MM3 (ref 4.14–5.8)
SODIUM SERPL-SCNC: 140 MMOL/L (ref 136–145)
SP GR UR STRIP: 1.01 (ref 1–1.03)
UROBILINOGEN UR QL STRIP: ABNORMAL
WBC NRBC COR # BLD AUTO: 6.84 10*3/MM3 (ref 3.4–10.8)

## 2024-06-07 PROCEDURE — 80048 BASIC METABOLIC PNL TOTAL CA: CPT

## 2024-06-07 PROCEDURE — 81003 URINALYSIS AUTO W/O SCOPE: CPT

## 2024-06-07 PROCEDURE — 85027 COMPLETE CBC AUTOMATED: CPT

## 2024-06-07 PROCEDURE — 36415 COLL VENOUS BLD VENIPUNCTURE: CPT

## 2024-06-07 PROCEDURE — 85610 PROTHROMBIN TIME: CPT

## 2024-06-07 NOTE — DISCHARGE INSTRUCTIONS
Take the following medications the morning of surgery:BRING INHALERS DAY OF SURGERY.  METOPROLOL, DILTIAZEM, KEPPRA      If you are on prescription narcotic pain medication to control your pain you may also take that medication the morning of surgery.    General Instructions:  Do not eat solid food after midnight the night before surgery.  You may drink clear liquids day of surgery but must stop at least one hour before your hospital arrival time.  It is beneficial for you to have a clear drink that contains carbohydrates the day of surgery.  We suggest a 12 to 20 ounce bottle of Gatorade or Powerade for non-diabetic patients or a 12 to 20 ounce bottle of G2 or Powerade Zero for diabetic patients. (Pediatric patients, are not advised to drink a 12 to 20 ounce carbohydrate drink)    Clear liquids are liquids you can see through.  Nothing red in color.     Plain water                               Sports drinks  Sodas                                   Gelatin (Jell-O)  Fruit juices without pulp such as white grape juice and apple juice  Popsicles that contain no fruit or yogurt  Tea or coffee (no cream or milk added)  Gatorade / Powerade  G2 / Powerade Zero    Infants may have breast milk up to four hours before surgery.  Infants drinking formula may drink formula up to six hours before surgery.   Patients who avoid smoking, chewing tobacco and alcohol for 4 weeks prior to surgery have a reduced risk of post-operative complications.  Quit smoking as many days before surgery as you can.  Do not smoke, use chewing tobacco or drink alcohol the day of surgery.   If applicable bring your C-PAP/ BI-PAP machine in with you to preop day of surgery.  Bring any papers given to you in the doctor’s office.  Wear clean comfortable clothes.  Do not wear contact lenses, false eyelashes or make-up.  Bring a case for your glasses.   Bring crutches or walker if applicable.  Remove all piercings.  Leave jewelry and any other valuables  at home.  Hair extensions with metal clips must be removed prior to surgery.  The Pre-Admission Testing nurse will instruct you to bring medications if unable to obtain an accurate list in Pre-Admission Testing.        If you were given a blood bank ID arm band remember to bring it with you the day of surgery.    Preventing a Surgical Site Infection:  For 2 to 3 days before surgery, avoid shaving with a razor because the razor can irritate skin and make it easier to develop an infection.    Any areas of open skin can increase the risk of a post-operative wound infection by allowing bacteria to enter and travel throughout the body.  Notify your surgeon if you have any skin wounds / rashes even if it is not near the expected surgical site.  The area will need assessed to determine if surgery should be delayed until it is healed.  The night prior to surgery shower using a fresh bar of anti-bacterial soap (such as Dial) and clean washcloth.  Sleep in a clean bed with clean clothing.  Do not allow pets to sleep with you.  Shower on the morning of surgery using a fresh bar of anti-bacterial soap (such as Dial) and clean washcloth.  Dry with a clean towel and dress in clean clothing.  Ask your surgeon if you will be receiving antibiotics prior to surgery.  Make sure you, your family, and all healthcare providers clean their hands with soap and water or an alcohol based hand  before caring for you or your wound.    Day of surgery:  Your arrival time is approximately two hours before your scheduled surgery time.  Upon arrival, a Pre-op nurse and Anesthesiologist will review your health history, obtain vital signs, and answer questions you may have.  The only belongings needed at this time will be a list of your home medications and if applicable your C-PAP/BI-PAP machine.  A Pre-op nurse will start an IV and you may receive medication in preparation for surgery, including something to help you relax.     Please be  aware that surgery does come with discomfort.  We want to make every effort to control your discomfort so please discuss any uncontrolled symptoms with your nurse.   Your doctor will most likely have prescribed pain medications.      If you are going home after surgery you will receive individualized written care instructions before being discharged.  A responsible adult must drive you to and from the hospital on the day of your surgery and ideally stay with you through the night.   .  Discharge prescriptions can be filled by the hospital pharmacy during regular pharmacy hours.  If you are having surgery late in the day/evening your prescription may be e-prescribed to your pharmacy.  Please verify your pharmacy hours or chose a 24 hour pharmacy to avoid not having access to your prescription because your pharmacy has closed for the day.    If you are staying overnight following surgery, you will be transported to your hospital room following the recovery period.  [facility] has all private rooms.    If you have any questions please call Pre-Admission Testing at [phone].  Deductibles and co-payments are collected on the day of service. Please be prepared to pay the required co-pay, deductible or deposit on the day of service as defined by your plan.    Call your surgeon immediately if you experience any of the following symptoms:  Sore Throat  Shortness of Breath or difficulty breathing  Cough  Chills  Body soreness or muscle pain  Headache  Fever  New loss of taste or smell  Do not arrive for your surgery ill.  Your procedure will need to be rescheduled to another time.  You will need to call your physician before the day of surgery to avoid any unnecessary exposure to hospital staff as well as other patients.              CHLORHEXIDINE CLOTH INSTRUCTIONS  The morning of surgery follow these instructions using the Chlorhexidine cloths you've been given.  These steps reduce bacteria on the body.   Do not use the cloths near your eyes, ears mouth, genitalia or on open wounds.  Throw the cloths away after use but do not try to flush them down a toilet.      Open and remove one cloth at a time from the package.    Leave the cloth unfolded and begin the bathing.  Massage the skin with the cloths using gentle pressure to remove bacteria.  Do not scrub harshly.   Follow the steps below with one 2% CHG cloth per area (6 total cloths).  One cloth for neck, shoulders and chest.  One cloth for both arms, hands, fingers and underarms (do underarms last).  One cloth for the abdomen followed by groin.  One cloth for right leg and foot including between the toes.  One cloth for left leg and foot including between the toes.  The last cloth is to be used for the back of the neck, back and buttocks.    Allow the CHG to air dry 3 minutes on the skin which will give it time to work and decrease the chance of irritation.  The skin may feel sticky until it is dry.  Do not rinse with water or any other liquid or you will lose the beneficial effects of the CHG.  If mild skin irritation occurs, do rinse the skin to remove the CHG.  Report this to the nurse at time of admission.  Do not apply lotions, creams, ointments, deodorants or perfumes after using the clothes. Dress in clean clothes before coming to the hospital.

## 2024-06-09 PROCEDURE — S0260 H&P FOR SURGERY: HCPCS | Performed by: SURGERY

## 2024-06-09 PROCEDURE — 76937 US GUIDE VASCULAR ACCESS: CPT | Performed by: SURGERY

## 2024-06-09 PROCEDURE — 75710 ARTERY X-RAYS ARM/LEG: CPT | Performed by: SURGERY

## 2024-06-09 PROCEDURE — 75625 CONTRAST EXAM ABDOMINL AORTA: CPT | Performed by: SURGERY

## 2024-06-10 ENCOUNTER — HOSPITAL ENCOUNTER (OUTPATIENT)
Facility: HOSPITAL | Age: 69
Setting detail: HOSPITAL OUTPATIENT SURGERY
Discharge: HOME | End: 2024-06-10
Attending: SURGERY | Admitting: SURGERY
Payer: MEDICARE

## 2024-06-10 ENCOUNTER — APPOINTMENT (OUTPATIENT)
Dept: GENERAL RADIOLOGY | Facility: HOSPITAL | Age: 69
End: 2024-06-10
Payer: MEDICARE

## 2024-06-10 ENCOUNTER — ANESTHESIA (OUTPATIENT)
Dept: PERIOP | Facility: HOSPITAL | Age: 69
End: 2024-06-10
Payer: MEDICARE

## 2024-06-10 ENCOUNTER — ANESTHESIA EVENT (OUTPATIENT)
Dept: PERIOP | Facility: HOSPITAL | Age: 69
End: 2024-06-10
Payer: MEDICARE

## 2024-06-10 VITALS
SYSTOLIC BLOOD PRESSURE: 138 MMHG | HEART RATE: 77 BPM | RESPIRATION RATE: 16 BRPM | OXYGEN SATURATION: 93 % | DIASTOLIC BLOOD PRESSURE: 71 MMHG | TEMPERATURE: 97.4 F

## 2024-06-10 DIAGNOSIS — I70.312 ATHEROSCLEROSIS OF BYPASS GRAFT OF LEFT LOWER EXTREMITY WITH INTERMITTENT CLAUDICATION: ICD-10-CM

## 2024-06-10 LAB
GLUCOSE BLDC GLUCOMTR-MCNC: 116 MG/DL (ref 70–130)
GLUCOSE BLDC GLUCOMTR-MCNC: 160 MG/DL (ref 70–130)

## 2024-06-10 PROCEDURE — C1894 INTRO/SHEATH, NON-LASER: HCPCS | Performed by: SURGERY

## 2024-06-10 PROCEDURE — C1769 GUIDE WIRE: HCPCS | Performed by: SURGERY

## 2024-06-10 PROCEDURE — 25010000002 HEPARIN (PORCINE) PER 1000 UNITS: Performed by: SURGERY

## 2024-06-10 PROCEDURE — C1725 CATH, TRANSLUMIN NON-LASER: HCPCS | Performed by: SURGERY

## 2024-06-10 PROCEDURE — 25810000003 LACTATED RINGERS PER 1000 ML: Performed by: STUDENT IN AN ORGANIZED HEALTH CARE EDUCATION/TRAINING PROGRAM

## 2024-06-10 PROCEDURE — C1887 CATHETER, GUIDING: HCPCS | Performed by: SURGERY

## 2024-06-10 PROCEDURE — 25010000002 ONDANSETRON PER 1 MG: Performed by: NURSE ANESTHETIST, CERTIFIED REGISTERED

## 2024-06-10 PROCEDURE — 25010000002 SUGAMMADEX 200 MG/2ML SOLUTION: Performed by: NURSE ANESTHETIST, CERTIFIED REGISTERED

## 2024-06-10 PROCEDURE — 25510000001 IOPAMIDOL PER 1 ML: Performed by: SURGERY

## 2024-06-10 PROCEDURE — 25010000002 DEXAMETHASONE SODIUM PHOSPHATE 20 MG/5ML SOLUTION: Performed by: NURSE ANESTHETIST, CERTIFIED REGISTERED

## 2024-06-10 PROCEDURE — 82948 REAGENT STRIP/BLOOD GLUCOSE: CPT

## 2024-06-10 PROCEDURE — 25010000002 HEPARIN (PORCINE) PER 1000 UNITS: Performed by: NURSE ANESTHETIST, CERTIFIED REGISTERED

## 2024-06-10 PROCEDURE — 25010000002 PROPOFOL 200 MG/20ML EMULSION: Performed by: NURSE ANESTHETIST, CERTIFIED REGISTERED

## 2024-06-10 PROCEDURE — 25010000002 GLYCOPYRROLATE 0.2 MG/ML SOLUTION: Performed by: NURSE ANESTHETIST, CERTIFIED REGISTERED

## 2024-06-10 PROCEDURE — 25010000002 PROTAMINE SULFATE PER 10 MG: Performed by: NURSE ANESTHETIST, CERTIFIED REGISTERED

## 2024-06-10 PROCEDURE — 25010000002 CEFAZOLIN PER 500 MG: Performed by: SURGERY

## 2024-06-10 PROCEDURE — 25010000002 PHENYLEPHRINE 10 MG/ML SOLUTION: Performed by: NURSE ANESTHETIST, CERTIFIED REGISTERED

## 2024-06-10 PROCEDURE — 37228 PR REVSC OPN/PRQ TIB/PERO W/ANGIOPLASTY UNI: CPT | Performed by: SURGERY

## 2024-06-10 RX ORDER — MIDAZOLAM HYDROCHLORIDE 1 MG/ML
0.5 INJECTION INTRAMUSCULAR; INTRAVENOUS
Status: DISCONTINUED | OUTPATIENT
Start: 2024-06-10 | End: 2024-06-10 | Stop reason: HOSPADM

## 2024-06-10 RX ORDER — SODIUM CHLORIDE 0.9 % (FLUSH) 0.9 %
3 SYRINGE (ML) INJECTION EVERY 12 HOURS SCHEDULED
Status: DISCONTINUED | OUTPATIENT
Start: 2024-06-10 | End: 2024-06-10 | Stop reason: HOSPADM

## 2024-06-10 RX ORDER — PROPOFOL 10 MG/ML
INJECTION, EMULSION INTRAVENOUS AS NEEDED
Status: DISCONTINUED | OUTPATIENT
Start: 2024-06-10 | End: 2024-06-10 | Stop reason: SURG

## 2024-06-10 RX ORDER — SODIUM CHLORIDE 0.9 % (FLUSH) 0.9 %
3-10 SYRINGE (ML) INJECTION AS NEEDED
Status: DISCONTINUED | OUTPATIENT
Start: 2024-06-10 | End: 2024-06-10 | Stop reason: HOSPADM

## 2024-06-10 RX ORDER — FENTANYL CITRATE 50 UG/ML
50 INJECTION, SOLUTION INTRAMUSCULAR; INTRAVENOUS ONCE AS NEEDED
Status: DISCONTINUED | OUTPATIENT
Start: 2024-06-10 | End: 2024-06-10 | Stop reason: HOSPADM

## 2024-06-10 RX ORDER — EPHEDRINE SULFATE 50 MG/ML
5 INJECTION, SOLUTION INTRAVENOUS ONCE AS NEEDED
Status: DISCONTINUED | OUTPATIENT
Start: 2024-06-10 | End: 2024-06-10 | Stop reason: HOSPADM

## 2024-06-10 RX ORDER — DIPHENHYDRAMINE HYDROCHLORIDE 50 MG/ML
12.5 INJECTION INTRAMUSCULAR; INTRAVENOUS
Status: DISCONTINUED | OUTPATIENT
Start: 2024-06-10 | End: 2024-06-10 | Stop reason: HOSPADM

## 2024-06-10 RX ORDER — ONDANSETRON 2 MG/ML
4 INJECTION INTRAMUSCULAR; INTRAVENOUS ONCE AS NEEDED
Status: DISCONTINUED | OUTPATIENT
Start: 2024-06-10 | End: 2024-06-10 | Stop reason: HOSPADM

## 2024-06-10 RX ORDER — PROMETHAZINE HYDROCHLORIDE 25 MG/1
25 TABLET ORAL ONCE AS NEEDED
Status: DISCONTINUED | OUTPATIENT
Start: 2024-06-10 | End: 2024-06-10 | Stop reason: HOSPADM

## 2024-06-10 RX ORDER — HYDROMORPHONE HYDROCHLORIDE 1 MG/ML
0.25 INJECTION, SOLUTION INTRAMUSCULAR; INTRAVENOUS; SUBCUTANEOUS
Status: DISCONTINUED | OUTPATIENT
Start: 2024-06-10 | End: 2024-06-10 | Stop reason: HOSPADM

## 2024-06-10 RX ORDER — HYDRALAZINE HYDROCHLORIDE 20 MG/ML
5 INJECTION INTRAMUSCULAR; INTRAVENOUS
Status: DISCONTINUED | OUTPATIENT
Start: 2024-06-10 | End: 2024-06-10 | Stop reason: HOSPADM

## 2024-06-10 RX ORDER — FAMOTIDINE 10 MG/ML
20 INJECTION, SOLUTION INTRAVENOUS ONCE
Status: COMPLETED | OUTPATIENT
Start: 2024-06-10 | End: 2024-06-10

## 2024-06-10 RX ORDER — ONDANSETRON 2 MG/ML
INJECTION INTRAMUSCULAR; INTRAVENOUS AS NEEDED
Status: DISCONTINUED | OUTPATIENT
Start: 2024-06-10 | End: 2024-06-10 | Stop reason: SURG

## 2024-06-10 RX ORDER — DEXAMETHASONE SODIUM PHOSPHATE 4 MG/ML
INJECTION, SOLUTION INTRA-ARTICULAR; INTRALESIONAL; INTRAMUSCULAR; INTRAVENOUS; SOFT TISSUE AS NEEDED
Status: DISCONTINUED | OUTPATIENT
Start: 2024-06-10 | End: 2024-06-10 | Stop reason: SURG

## 2024-06-10 RX ORDER — PROTAMINE SULFATE 10 MG/ML
INJECTION, SOLUTION INTRAVENOUS AS NEEDED
Status: DISCONTINUED | OUTPATIENT
Start: 2024-06-10 | End: 2024-06-10 | Stop reason: SURG

## 2024-06-10 RX ORDER — LABETALOL HYDROCHLORIDE 5 MG/ML
5 INJECTION, SOLUTION INTRAVENOUS
Status: DISCONTINUED | OUTPATIENT
Start: 2024-06-10 | End: 2024-06-10 | Stop reason: HOSPADM

## 2024-06-10 RX ORDER — IPRATROPIUM BROMIDE AND ALBUTEROL SULFATE 2.5; .5 MG/3ML; MG/3ML
3 SOLUTION RESPIRATORY (INHALATION) ONCE AS NEEDED
Status: DISCONTINUED | OUTPATIENT
Start: 2024-06-10 | End: 2024-06-10 | Stop reason: HOSPADM

## 2024-06-10 RX ORDER — FLUMAZENIL 0.1 MG/ML
0.2 INJECTION INTRAVENOUS AS NEEDED
Status: DISCONTINUED | OUTPATIENT
Start: 2024-06-10 | End: 2024-06-10 | Stop reason: HOSPADM

## 2024-06-10 RX ORDER — NALOXONE HCL 0.4 MG/ML
0.2 VIAL (ML) INJECTION AS NEEDED
Status: DISCONTINUED | OUTPATIENT
Start: 2024-06-10 | End: 2024-06-10 | Stop reason: HOSPADM

## 2024-06-10 RX ORDER — LIDOCAINE HYDROCHLORIDE 20 MG/ML
INJECTION, SOLUTION INFILTRATION; PERINEURAL AS NEEDED
Status: DISCONTINUED | OUTPATIENT
Start: 2024-06-10 | End: 2024-06-10 | Stop reason: SURG

## 2024-06-10 RX ORDER — SODIUM CHLORIDE, SODIUM LACTATE, POTASSIUM CHLORIDE, CALCIUM CHLORIDE 600; 310; 30; 20 MG/100ML; MG/100ML; MG/100ML; MG/100ML
9 INJECTION, SOLUTION INTRAVENOUS CONTINUOUS
Status: DISCONTINUED | OUTPATIENT
Start: 2024-06-10 | End: 2024-06-10 | Stop reason: HOSPADM

## 2024-06-10 RX ORDER — HYDROCODONE BITARTRATE AND ACETAMINOPHEN 5; 325 MG/1; MG/1
1 TABLET ORAL ONCE AS NEEDED
Status: DISCONTINUED | OUTPATIENT
Start: 2024-06-10 | End: 2024-06-10 | Stop reason: HOSPADM

## 2024-06-10 RX ORDER — HEPARIN SODIUM 1000 [USP'U]/ML
INJECTION, SOLUTION INTRAVENOUS; SUBCUTANEOUS AS NEEDED
Status: DISCONTINUED | OUTPATIENT
Start: 2024-06-10 | End: 2024-06-10 | Stop reason: SURG

## 2024-06-10 RX ORDER — PROMETHAZINE HYDROCHLORIDE 25 MG/1
25 SUPPOSITORY RECTAL ONCE AS NEEDED
Status: DISCONTINUED | OUTPATIENT
Start: 2024-06-10 | End: 2024-06-10 | Stop reason: HOSPADM

## 2024-06-10 RX ORDER — PHENYLEPHRINE HYDROCHLORIDE 10 MG/ML
INJECTION INTRAVENOUS AS NEEDED
Status: DISCONTINUED | OUTPATIENT
Start: 2024-06-10 | End: 2024-06-10 | Stop reason: SURG

## 2024-06-10 RX ORDER — HYDROCODONE BITARTRATE AND ACETAMINOPHEN 7.5; 325 MG/1; MG/1
1 TABLET ORAL EVERY 4 HOURS PRN
Status: DISCONTINUED | OUTPATIENT
Start: 2024-06-10 | End: 2024-06-10 | Stop reason: HOSPADM

## 2024-06-10 RX ORDER — LIDOCAINE HYDROCHLORIDE 10 MG/ML
0.5 INJECTION, SOLUTION INFILTRATION; PERINEURAL ONCE AS NEEDED
Status: DISCONTINUED | OUTPATIENT
Start: 2024-06-10 | End: 2024-06-10 | Stop reason: HOSPADM

## 2024-06-10 RX ORDER — FENTANYL CITRATE 50 UG/ML
25 INJECTION, SOLUTION INTRAMUSCULAR; INTRAVENOUS
Status: DISCONTINUED | OUTPATIENT
Start: 2024-06-10 | End: 2024-06-10 | Stop reason: HOSPADM

## 2024-06-10 RX ORDER — DROPERIDOL 2.5 MG/ML
0.62 INJECTION, SOLUTION INTRAMUSCULAR; INTRAVENOUS
Status: DISCONTINUED | OUTPATIENT
Start: 2024-06-10 | End: 2024-06-10 | Stop reason: HOSPADM

## 2024-06-10 RX ORDER — GLYCOPYRROLATE 0.2 MG/ML
INJECTION INTRAMUSCULAR; INTRAVENOUS AS NEEDED
Status: DISCONTINUED | OUTPATIENT
Start: 2024-06-10 | End: 2024-06-10 | Stop reason: SURG

## 2024-06-10 RX ORDER — ROCURONIUM BROMIDE 10 MG/ML
INJECTION, SOLUTION INTRAVENOUS AS NEEDED
Status: DISCONTINUED | OUTPATIENT
Start: 2024-06-10 | End: 2024-06-10 | Stop reason: SURG

## 2024-06-10 RX ADMIN — PROPOFOL 120 MG: 10 INJECTION, EMULSION INTRAVENOUS at 09:38

## 2024-06-10 RX ADMIN — ONDANSETRON 4 MG: 2 INJECTION INTRAMUSCULAR; INTRAVENOUS at 10:33

## 2024-06-10 RX ADMIN — SUGAMMADEX 100 MG: 100 INJECTION, SOLUTION INTRAVENOUS at 10:45

## 2024-06-10 RX ADMIN — PROTAMINE SULFATE 30 MG: 10 INJECTION, SOLUTION INTRAVENOUS at 10:37

## 2024-06-10 RX ADMIN — DEXAMETHASONE SODIUM PHOSPHATE 8 MG: 4 INJECTION, SOLUTION INTRAMUSCULAR; INTRAVENOUS at 09:47

## 2024-06-10 RX ADMIN — FAMOTIDINE 20 MG: 10 INJECTION INTRAVENOUS at 08:25

## 2024-06-10 RX ADMIN — GLYCOPYRROLATE 0.2 MG: 0.2 INJECTION INTRAMUSCULAR; INTRAVENOUS at 10:32

## 2024-06-10 RX ADMIN — CEFAZOLIN 2000 MG: 2 INJECTION, POWDER, FOR SOLUTION INTRAVENOUS at 09:26

## 2024-06-10 RX ADMIN — PHENYLEPHRINE HYDROCHLORIDE 100 MCG: 10 INJECTION INTRAVENOUS at 10:43

## 2024-06-10 RX ADMIN — ROCURONIUM BROMIDE 50 MG: 10 INJECTION, SOLUTION INTRAVENOUS at 09:38

## 2024-06-10 RX ADMIN — LIDOCAINE HYDROCHLORIDE 100 MG: 20 INJECTION, SOLUTION INFILTRATION; PERINEURAL at 10:42

## 2024-06-10 RX ADMIN — SUGAMMADEX 100 MG: 100 INJECTION, SOLUTION INTRAVENOUS at 10:41

## 2024-06-10 RX ADMIN — PHENYLEPHRINE HYDROCHLORIDE 100 MCG: 10 INJECTION INTRAVENOUS at 10:10

## 2024-06-10 RX ADMIN — PHENYLEPHRINE HYDROCHLORIDE 100 MCG: 10 INJECTION INTRAVENOUS at 10:31

## 2024-06-10 RX ADMIN — IOPAMIDOL 73 ML: 510 INJECTION, SOLUTION INTRAVASCULAR at 10:38

## 2024-06-10 RX ADMIN — SODIUM CHLORIDE, POTASSIUM CHLORIDE, SODIUM LACTATE AND CALCIUM CHLORIDE 9 ML/HR: 600; 310; 30; 20 INJECTION, SOLUTION INTRAVENOUS at 08:26

## 2024-06-10 RX ADMIN — LIDOCAINE HYDROCHLORIDE 100 MG: 20 INJECTION, SOLUTION INFILTRATION; PERINEURAL at 09:38

## 2024-06-10 RX ADMIN — HEPARIN SODIUM 7500 UNITS: 1000 INJECTION, SOLUTION INTRAVENOUS; SUBCUTANEOUS at 10:00

## 2024-06-10 NOTE — DISCHARGE INSTRUCTIONS
1.  Near flat in bed, head of bed may be up 15 degrees for 4 hours.  After 4 hours patient may sit up then ambulate.  If no bleeding from groin may discharge home.  2.  If scheduled Eliquis dose this evening.  3.  May remove dressing in a.m. and take shower.  4.  No lifting more than 10 pounds for 3 days.  Activity ad jennifer. beginning Thursday.

## 2024-06-10 NOTE — H&P
History of Present IllnessThe patient has had multiple revascularizations of the lower extremities bilaterally with recurrent failure on the left with short distance claudication and rest pain. On February 3, 2023, he underwent left femoral to   tibioperoneal trunk bypass with in situ saphenous vein and tibioperoneal trunk endarterectomy. Previously, on June 25,2021, he had right superficial femoral artery angioplasty with drug-eluting stent placement with recurrent claudication symptoms and in-stent stenosis, and had right superficial femoral artery atherectomy and repeat angioplasty and drug-eluting stent placement on March 11, 2022. He had arteriography with angioplasty of the distal segment of the vein graft and his outflow tract for 2 procedures peroneal artery on July 10, 2023. He was seen on November 8, 2023 with left lower extremity duplex scan and ABIs. The left lower extremity bypass was patent without stenosis. Velocities distally were all satisfactory. ABIs were 0.66 on the right and 0.5 on the left which were unchanged on the right and mildly improved on the left. He remains on aspirin, Eliquis, and Crestor. He quit smoking at the time of his left leg bypass and remains free of tobacco use. He was seen on March 6, 2024 with repeat studies. He has a patent graft with no significant stenosis. Outflow vessel velocities were decreased but ABIs were unchanged.  He returned on June 5, 2024 with repeat studies.  This demonstrated a patent bypass graft but significant stenotic changes noted distally in the graft and the outflow artery.  Velocities were markedly diminished with near occlusive flow.  ABIs were normal on the right and 0.48 on the left.     Past History:  Medical History: has a past medical history of Anticoagulated, Arthritis, Asthma, Atherosclerosis of native arteries of extremities with intermittent claudication, bilateral legs (01/22/2020), Bladder cancer (2004), CAD (coronary artery disease)  (01/22/2020), COPD (chronic obstructive pulmonary disease) (01/22/2020), Coronary artery disease, Diabetes mellitus, Diverticulitis, DVT OF PROXIMAL LOWER LIMB (06/2022), GERD (gastroesophageal reflux disease), Heart attack (2007), History of COVID-19 (09/2022), colonic polyps, Hyperlipidemia (01/22/2020), Hypertension (01/22/2020), Long term (current) use of anticoagulants (08/19/2022), Onychomycosis, PVD (peripheral vascular disease), Seizures, Slow to wake up after anesthesia, and Tobacco use (01/22/2020).   Surgical History: has a past surgical history that includes Back surgery; Hernia repair; Coronary artery bypass graft (2007); Upper gastrointestinal endoscopy (07/22/2020); Colonoscopy (07/22/2020); Vascular surgery (Right, 2022); EKOS Catheter Placement (Right, 06/16/2022); EKOS Catheter Removal (N/A, 06/17/2022); Bladder surgery (2004); Appendectomy (1970); Leg Thrombectomy/Embolectomy (Right, 06/17/2022); Femoral artery stent (Right, 06/17/2022); Athrectomy Iliac, Femoral, Tibial Artery (Left, 10/20/2022); femoral popliteal bypass (Left, 02/02/2023); THORACOSCOPY WITH BIOPSY (Left, 07/10/2023); Neck surgery; Peripheral arterial stent graft (06/25/2021); and Angioplasty (Right, 06/17/2022).   Family History: family history includes Cancer in his son; Clotting disorder in his father and sister; Diabetes in his mother; Heart attack in his father; Heart disease in his mother; Heart failure in his mother; Lung cancer in his brother; Sleep apnea in his sister; Stroke in his brother.   Social History: reports that he quit smoking about 15 months ago. His smoking use included cigarettes. He started smoking about 55 years ago. He has a 27 pack-year smoking history. He has never used smokeless tobacco. He reports that he does not currently use alcohol. He reports that he does not use drugs.       Prescriptions Prior to Admission   (Not in a hospital admission)      Allergies: Penicillins         Objective  Vital  "Signs:  There were no vitals taken for this visit.  Estimated body mass index is 30.65 kg/m² as calculated from the following:    Height as of 5/10/24: 170.2 cm (67\").    Weight as of 5/10/24: 88.8 kg (195 lb 11.2 oz).        Yehuda Weaver  reports that he quit smoking about 15 months ago. His smoking use included cigarettes. He started smoking about 55 years ago. He has a 27 pack-year smoking history. He has never used smokeless tobacco.             Physical Exam   HEENT: Normocephalic and atraumatic, extraocular movements intact, sclera nonicteric  Neck: Supple, full range of motion, no JVD  Heart: Normal sinus rhythm without murmur  Chest: Equal bilateral expansion and symmetrical unlabored breathing  Abdomen: Soft and benign no masses palpated  Extremities: Palpable femoral pulses noted bilaterally.  Trace palpable pedal pulses on the right.  No palpable pedal pulses on the left.  Palpable graft pulse noted at the knee on the left.        Result Review  :     The following data was reviewed by: Donna Bean Jr., MD on 06/05/2024:     Carotid scan and ABIs on June 5, 2024, findings as described above.                  Assessment  Assessment and Plan      Diagnoses and all orders for this visit:     1. Atherosclerosis of native arteries of extremities with intermittent claudication, bilateral legs (Primary)     2. Coronary artery disease involving native coronary artery of native heart without angina pectoris     3. Mixed hyperlipidemia     4. Primary hypertension     5. Chronic obstructive pulmonary disease, unspecified COPD type     6. 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 Arterial stent thrombosis, subsequent encounter     Once again, it appears that he has had significant stenosis in his distal graft and his outflow artery.  He is at elevated risk of graft occlusion and with this elevated risk of limb loss.  I have recommended repeat arteriography with attempts at " endovascular intervention with angioplasty.  He understands that if he were to get restenosis following this that he would likely need revision of his bypass graft with arm vein, jumping down to the more distal artery.  This was discussed in detail with the patient and his family members.  They would like to proceed with arteriography and endovascular intervention at this time.     Elizabeth 10, 2024: History and physical examination reviewed and no changes from that dictated above.

## 2024-06-10 NOTE — ANESTHESIA PROCEDURE NOTES
Airway  Urgency: elective    Date/Time: 6/10/2024 9:43 AM  Airway not difficult    General Information and Staff    Patient location during procedure: OR  Anesthesiologist: Brooke Khan MD  CRNA/CAA: Lelo Hart CRNA    Indications and Patient Condition  Indications for airway management: airway protection    Preoxygenated: yes  Mask difficulty assessment: 2 - vent by mask + OA or adjuvant +/- NMBA    Final Airway Details  Final airway type: endotracheal airway      Successful airway: ETT  Cuffed: yes   Successful intubation technique: direct laryngoscopy  Facilitating devices/methods: intubating stylet  Endotracheal tube insertion site: oral  Blade: Gagandeep  Blade size: 4  ETT size (mm): 7.5  Cormack-Lehane Classification: grade I - full view of glottis  Placement verified by: chest auscultation and capnometry   Inital cuff pressure (cm H2O): 19  Cuff volume (mL): 8  Measured from: lips  ETT/EBT  to lips (cm): 23  Number of attempts at approach: 1  Assessment: lips, teeth, and gum same as pre-op and atraumatic intubation

## 2024-06-10 NOTE — ANESTHESIA PREPROCEDURE EVALUATION
Anesthesia Evaluation     Patient summary reviewed and Nursing notes reviewed   no history of anesthetic complications:   NPO Solid Status: > 6 hours  NPO Liquid Status: > 6 hours           Airway   Mallampati: III  TM distance: >3 FB  Neck ROM: full  Comment: Mask Difficulty Assessment: 1 - vent by mask  Final Endotracheal Airway: ETT  Cuffed: Yes  Cormack-Lehane Classification: grade I - full view of glottis  Technique Used For Successful Placement: direct laryngoscopy  Insertion Site: oral  Blade Type: Gagandeep  Blade Size: 3  ETT Size (mm): 8.0      Dental    (+) upper dentures    Pulmonary    (+) COPD, asthma,  Cardiovascular     NYHA Classification: I  ECG reviewed  PT is on anticoagulation therapy    (+) hypertension, past MI , CAD, CABG, PVD, DVT, hyperlipidemia      Neuro/Psych- negative ROS  GI/Hepatic/Renal/Endo    (+) GERD, diabetes mellitus    Musculoskeletal (-) negative ROS    Abdominal    Substance History - negative use     OB/GYN          Other      history of cancer remission                      Anesthesia Plan    ASA 3     general     (I have reviewed the patient's history with the patient and the chart, including all pertinent laboratory results and imaging. I have explained the risks of anesthesia including but not limited to dental damage, corneal abrasion, nerve injury, MI, stroke, and death. Questions asked and answered. Anesthetic plan discussed with patient and team as indicated. Patient expressed understanding of the above.  )  intravenous induction     Anesthetic plan, risks, benefits, and alternatives have been provided, discussed and informed consent has been obtained with: patient.        CODE STATUS:

## 2024-06-10 NOTE — PERIOPERATIVE NURSING NOTE
PACU: patient noted to have extensive multiple small raised white bumps across both legs from shin to feet. Patient reports he has seen his PCP regarding this issue and was told they are a form or warts

## 2024-06-10 NOTE — ANESTHESIA POSTPROCEDURE EVALUATION
Patient: Yehuda Weaver    Procedure Summary       Date: 06/10/24 Room / Location: [facility] OR  [facility]    Anesthesia Start: 0930 Anesthesia Stop: 1055    Procedure: LEFT LOWER EXTREMITY ARTERIOGRAM WITH  ANGIOPLASTY (Left: Thigh) Diagnosis:       Atherosclerosis of bypass graft of left lower extremity with intermittent claudication      (Atherosclerosis of bypass graft of left lower extremity with intermittent claudication [I70.312])    Surgeons: Donna Bean Jr., MD Provider: Brooke Khan MD    Anesthesia Type: general ASA Status: 3            Anesthesia Type: general    Vitals  Vitals Value Taken Time   /80 06/10/24 1300   Temp 36.3 °C (97.4 °F) 06/10/24 1055   Pulse 69 06/10/24 1313   Resp 18 06/10/24 1300   SpO2 94 % 06/10/24 1312   Vitals shown include unfiled device data.        Post Anesthesia Care and Evaluation    Anesthetic complications: No anesthetic complications    Comments: /71 (BP Location: Left arm, Patient Position: Lying)   Pulse 77   Temp 36.3 °C (97.4 °F) (Oral)   Resp 16   SpO2 93%     No anesthesia complications reported to me.

## 2024-06-10 NOTE — OP NOTE
Operative Note  Date of Admission:  6/10/2024  OR Date: 6/10/2024    Pre-op Diagnosis: Recurrent stenosis of distal left femoral tibial bypass graft and proximal peroneal artery with impending graft failure  Atherosclerosis of bypass graft of left lower extremity with intermittent claudication [I70.312]    Post-op Diagnosis:   Same  Post-Op Diagnosis Codes:     * Atherosclerosis of bypass graft of left lower extremity with intermittent claudication [I70.312]    Procedure:   1) duplex ultrasound-guided percutaneous access of the right common femoral artery with abdominal aortogram; left lower extremity angiogram; angioplasty of distal graft, tibioperoneal trunk and peroneal artery with 2.5 mm x 120 mm angioplasty balloon; angioplasty of the distal graft with a 4 mm x 120 mm angioplasty balloon; angioplasty of tibioperoneal trunk and peroneal artery with 3.5 x 120 mm angioplasty balloon    Surgeon: Donna Bean Jr, MD    Assistant:  KHANH Momin CSA   and they provided critical assistance during the case including suctioning, exposure, retraction, and reduction of blood loss.    Anesthesia: General    Staff:   Circulator: Cathie Golden RN  Radiology Technologist: Anel Menon, ROSE; Jillian Coyle  Scrub Person: Bella Chand  Assistant: Chacha Guaman CSA  Orientee: Maria Victoria Lares RN    Estimated Blood Loss: Minimal    Specimens:   * No orders in the log *    Complications: None apparent    Findings: Severe stenosis of the distal graft with nonvisualization of the tibioperoneal trunk but filling of the peroneal artery and posterior tibial arteries through collaterals.  After initial angioplasty there was flow through the graft into the tibioperoneal trunk and peroneal and posterior tibial arteries but still residual significant stenosis noted.  Therefore the graft was first dilated then the tibial peroneal trunk and peroneal arteries with very good flow and more uniform in  appearance.    Indications:  As in preop diagnosis           Procedure: Patient was placed on the operating table in supine position.  After satisfactory general anesthesia was achieved the patient was prepped from the umbilicus to the knees by laterally using ChloraPrep and draped in the usual sterile fashion.  Using duplex ultrasound the right common femoral artery was identified and accessed percutaneously.  Guidewire was advanced followed by a 0.035 guidewire placed in the upper abdominal aorta.  A 6 Romansh sheath was placed over the guidewire followed by the pigtail catheter.  An abdominal aortogram was performed using 15 cc of contrast at 10 cc/s.  This demonstrated patent renal arteries bilaterally without stenosis.  The infrarenal abdominal aorta had mild irregularity with ectasia noted distally.  Both common iliac, external iliac, and hypogastric arteries were patent without stenosis.  Both common femoral arteries were patent without stenosis.  The image intensifier was then placed over the pelvis and a pelvic run was performed.  This demonstrated both common femoral arteries to be patent without stenosis as were both profundofemoral arteries.  The superficial femoral artery on the right was patent without stenosis including the stented portion in the proximal and midportion.  The bypass graft on the left was patent although flow was very sluggish.  7500 units of heparin was then given intravenously.  A rim catheter was then placed over the guidewire to select the contralateral right common iliac artery and the guidewire was advanced down into the profundofemoral artery.  The rim catheter was advanced over this and was actually used to select the origin of the bypass graft.  Guidewire was advanced into the bypass graft as well as a rim catheter.  This guidewire was exchanged for a 0.035 advantage guidewire.  A 6 Romansh by 45 cm long sheath was placed over the guidewire into the proximal portion of the vein  graft.  Sequential injections of 7 cc of contrast at 5 cc/s were then used to demonstrate the patent graft down to below the level of the knee then became very stenotic of more than 85 to 90% with a wisp of flow noted in the tibial peroneal trunk but reconstitution of the peroneal and posterior tibial arteries.  This guidewire was exchanged for a 0.014 advantage guidewire along with a quick cross catheter I was able to cross the stenotic area and occlusion in the guidewire into the peroneal artery.  Hand-held injection through the quick cross catheter confirmed this.  First a 2.5 mm x 120 mm angioplasty balloon was placed over the guidewire in the distal graft and tibial peroneal trunk and peroneal arteries were dilated.  This was inflated to 16 yaz for 3 minutes.  Following this there is marked improvement but still stenotic changes noted in the distal graft and tibial peroneal trunk.  The graft was then dilated with a 4 mm x 120 mm angioplasty balloon to 14 yaz for 3 minutes.  Another angiogram demonstrated the distal graft now being uniform with the more proximal graft.  Still stenotic changes noted in the tibioperoneal trunk and proximal peroneal artery of about 30 to 40%.  This was treated with a 3.5 x 120 mm angioplasty balloon up to 14 yaz for 3 minutes.  Following this there is complete resolution of the stenotic area with brisk flow noted through the graft into the peroneal artery and posterior tibial arteries into the foot.  Guidewires and catheters were pulled back.  Sheath was removed and manual pressure was held with a D-Stat dry hemostatic patch for 10 minutes.  30 mg protamine sulfate was given intravenously at the time of initiation of manual pressure.  Sterile dressings were applied.  Patient was transported to recovery room in satisfactory condition.  Doppler signals in the peroneal artery and posterior tibial artery and anterior tibial arteries were much improved.  Foot was warm and  well-perfused.        Radiographic Findings:  1) as described above      Active Hospital Problems    Diagnosis  POA    **Atherosclerosis of bypass graft of left lower extremity with intermittent claudication [I70.312]  Unknown      Resolved Hospital Problems   No resolved problems to display.      Donna Bean Jr., MD     Date: 6/10/2024  Time: 10:42 EDT

## 2024-06-18 ENCOUNTER — TELEPHONE (OUTPATIENT)
Dept: UROLOGY | Facility: CLINIC | Age: 69
End: 2024-06-18

## 2024-06-18 NOTE — TELEPHONE ENCOUNTER
CALLED TO OFFER R/S OF CX'D NEW PT APPT 6/18 W/ HERBERT    NO ANSWER, VM FULL    PT WAS REFERRED BY [facility] , ANYTHING ELSE TO DO?

## 2024-07-05 NOTE — PROGRESS NOTES
Chief Complaint  Claudication and Post-op Follow-up    Subjective        Yehuda Weaver presents to [facility] VASCULAR SURGERY  History of Present IllnessThe patient has had multiple revascularizations of the lower extremities bilaterally with recurrent failure on the left with short distance claudication and rest pain. On February 3, 2023, he underwent left femoral to   tibioperoneal trunk bypass with in situ saphenous vein and tibioperoneal trunk endarterectomy. Previously, on June 25,2021, he had right superficial femoral artery angioplasty with drug-eluting stent placement with recurrent claudication symptoms and in-stent stenosis, and had right superficial femoral artery atherectomy and repeat angioplasty and drug-eluting stent placement on March 11, 2022. He had arteriography with angioplasty of the distal segment of the vein graft and his outflow  peroneal artery on July 10, 2023, and again on Elizabeth 10, 2024 for the same findings.  He returned on July 10, 2024 for follow-up.  The numbness of the left foot has improved significantly, still mild numbness in the great toe but markedly improved.    Past History:  Medical History: has a past medical history of Arterial stent thrombosis, Arthritis, Asthma, Atherosclerosis of native arteries of extremities with intermittent claudication, bilateral legs (01/22/2020), Bladder cancer (2004), CAD (coronary artery disease) (01/22/2020), COPD (chronic obstructive pulmonary disease) (01/22/2020), Coronary artery disease, Diabetes mellitus, Diverticulitis, DVT OF PROXIMAL LOWER LIMB (06/2022), GERD (gastroesophageal reflux disease), Heart attack (2007), History of COVID-19 (09/2022), colonic polyps, Hyperlipidemia (01/22/2020), Hypertension (01/22/2020), Long term (current) use of anticoagulants (08/19/2022), Onychomycosis, PAD (peripheral artery disease), PVD (peripheral vascular disease), Seizures, Sleep apnea, and Slow to wake up after anesthesia.  "  Surgical History: has a past surgical history that includes Back surgery; Hernia repair; Coronary artery bypass graft (2007); Upper gastrointestinal endoscopy (07/22/2020); Colonoscopy (07/22/2020); Vascular surgery (Right, 2022); EKOS Catheter Placement (Right, 06/16/2022); EKOS Catheter Removal (N/A, 06/17/2022); Bladder surgery (2004); Appendectomy (1970); Leg Thrombectomy/Embolectomy (Right, 06/17/2022); Femoral artery stent (Right, 06/17/2022); Athrectomy Iliac, Femoral, Tibial Artery (Left, 10/20/2022); femoral popliteal bypass (Left, 02/02/2023); THORACOSCOPY WITH BIOPSY (Left, 07/10/2023); Neck surgery; Peripheral arterial stent graft (06/25/2021); Angioplasty (Right, 06/17/2022); and ARTERIOGRAM LOWER EXTREMITY WITH ANGIOPLASTY STENT (Left, 6/10/2024).   Family History: family history includes Cancer in his son; Clotting disorder in his father and sister; Diabetes in his mother; Heart attack in his father; Heart disease in his mother; Heart failure in his mother; Lung cancer in his brother; Sleep apnea in his sister; Stroke in his brother.   Social History: reports that he quit smoking about 17 months ago. His smoking use included cigarettes. He started smoking about 55 years ago. He has a 27 pack-year smoking history. He has never used smokeless tobacco. He reports that he does not currently use alcohol. He reports that he does not use drugs.    (Not in a hospital admission)     Allergies: Penicillins   Objective   Vital Signs:  /73   Pulse 72   Ht 167.6 cm (66\")   Wt 87.5 kg (193 lb)   BMI 31.15 kg/m²   Estimated body mass index is 31.15 kg/m² as calculated from the following:    Height as of this encounter: 167.6 cm (66\").    Weight as of this encounter: 87.5 kg (193 lb).         Yehuda Weaver  reports that he quit smoking about 17 months ago. His smoking use included cigarettes. He started smoking about 55 years ago. He has a 27 pack-year smoking history. He has never used smokeless " tobacco.         Physical Exam strong palpable graft pulse noted in the graft at the knee.  Doppler signals noted in the peroneal artery, dorsalis pedis artery, and posterior tibial artery with brisk capillary refill.  Result Review :                     Assessment and Plan     Diagnoses and all orders for this visit:    1. Atherosclerosis of native arteries of extremities with intermittent claudication, bilateral legs (Primary)  -     Doppler Ankle Brachial Index Single Level CAR; Future  -     Duplex Lower Extremity Art / Grafts - Left CAR; Future    2. Coronary artery disease involving native coronary artery of native heart without angina pectoris    3. Mechanical complication of bypass graft, subsequent encounter  -     Doppler Ankle Brachial Index Single Level CAR; Future  -     Duplex Lower Extremity Art / Grafts - Left CAR; Future    He is doing well following his angioplasty of his left femoral peroneal bypass graft.  I discussed with the patient and his significant other that recurrent stenosis is possible and if it does once again then we may need to consider a jump graft to the peroneal artery distal to this area.  We will continue the same in the interim.  I will see him in follow-up in 3 months with graft scan and ABIs.         Follow Up     Return in about 3 months (around 10/10/2024) for With graft scan and ABIs..  Patient was given instructions and counseling regarding his condition or for health maintenance advice. Please see specific information pulled into the AVS if appropriate.

## 2024-07-10 ENCOUNTER — OFFICE VISIT (OUTPATIENT)
Age: 69
End: 2024-07-10
Payer: MEDICARE

## 2024-07-10 VITALS
SYSTOLIC BLOOD PRESSURE: 125 MMHG | HEIGHT: 66 IN | WEIGHT: 193 LBS | HEART RATE: 72 BPM | BODY MASS INDEX: 31.02 KG/M2 | DIASTOLIC BLOOD PRESSURE: 73 MMHG

## 2024-07-10 DIAGNOSIS — T82.398D: ICD-10-CM

## 2024-07-10 DIAGNOSIS — I70.213 ATHEROSCLEROSIS OF NATIVE ARTERIES OF EXTREMITIES WITH INTERMITTENT CLAUDICATION, BILATERAL LEGS: Primary | Chronic | ICD-10-CM

## 2024-07-10 DIAGNOSIS — I25.10 CORONARY ARTERY DISEASE INVOLVING NATIVE CORONARY ARTERY OF NATIVE HEART WITHOUT ANGINA PECTORIS: ICD-10-CM

## 2024-07-10 RX ORDER — EMPAGLIFLOZIN 10 MG/1
TABLET, FILM COATED ORAL
COMMUNITY
Start: 2024-07-06

## 2024-09-11 NOTE — PROGRESS NOTES
[facility]  Cardiology progress Note    Patient Name: Yehuda Weaver  : 1955    CHIEF COMPLAINT  CAD        Subjective   Subjective     HISTORY OF PRESENT ILLNESS    Yehuda Weaver is a 69 y.o. male with history of CAD.  No chest pain.    REVIEW OF SYSTEMS    Constitutional:    No fever, no weight loss  Skin:     No rash  Otolaryngeal:    No difficulty swallowing  Cardiovascular: See HPI.  Pulmonary:    No cough, no sputum production    Personal History     Social History:    reports that he quit smoking about 19 months ago. His smoking use included cigarettes. He started smoking about 55 years ago. He has a 27 pack-year smoking history. He has never used smokeless tobacco. He reports that he does not currently use alcohol. He reports that he does not use drugs.    Home Medications:  Current Outpatient Medications on File Prior to Visit   Medication Sig    albuterol (PROVENTIL) (2.5 MG/3ML) 0.083% nebulizer solution Take 2.5 mg by nebulization Every 4 (Four) Hours As Needed for Wheezing.    albuterol sulfate  (90 Base) MCG/ACT inhaler Inhale 2 puffs Every 4 (Four) Hours As Needed for Wheezing.    Aspirin Low Dose 81 MG EC tablet Take 1 tablet by mouth Daily.    atorvastatin (LIPITOR) 80 MG tablet Take 1 tablet by mouth Every Night.    cholecalciferol (VITAMIN D3) 250 MCG (26821 UT) capsule Take 1 capsule by mouth Daily.    dilTIAZem XR (DILACOR XR) 240 MG 24 hr capsule Take 1 capsule by mouth Daily.    doxazosin (CARDURA) 4 MG tablet Take 0.5 tablets by mouth Every Night.    Eliquis 5 MG tablet tablet Take 1 tablet by mouth 2 (Two) Times a Day. PT STATES HOLDING FOR 2 DAYS PRIOR TO SURGERY    FeroSul 325 (65 Fe) MG tablet Take 1 tablet by mouth Daily.    fluticasone (FLONASE) 50 MCG/ACT nasal spray 1 spray into the nostril(s) as directed by provider Every Night.    Fluticasone-Salmeterol (ADVAIR DISKUS IN) Inhale 1 Inhalation Daily As Needed.    Jardiance 10 MG tablet tablet      lisinopril-hydrochlorothiazide (PRINZIDE,ZESTORETIC) 20-25 MG per tablet Take 1 tablet by mouth Daily.    metFORMIN ER (GLUCOPHAGE-XR) 500 MG 24 hr tablet Take 2 tablets by mouth Every 12 (Twelve) Hours. HOLDING FOR 24 HOURS PRIOR TO SURGERY PER MD GUIDELINE    metoprolol succinate XL (TOPROL-XL) 100 MG 24 hr tablet Take 1 tablet by mouth Daily.    montelukast (SINGULAIR) 10 MG tablet Take 1 tablet by mouth Daily.    multivitamin with minerals (One-A-Day Mens 50+ Advantage) tablet tablet Take 1 tablet by mouth Daily. HOLDING FOR DOS    Omega-3 Fatty Acids (fish oil) 1200 MG capsule capsule Take 1 capsule by mouth Daily. HOLDING FOR DOS    omeprazole (priLOSEC) 40 MG capsule Take 1 capsule by mouth As Needed.    levETIRAcetam (KEPPRA) 750 MG tablet Take 1 tablet by mouth Every 12 (Twelve) Hours for 30 days.     No current facility-administered medications on file prior to visit.       Past Medical History:   Diagnosis Date    Arterial stent thrombosis     Arthritis     Asthma     Atherosclerosis of native arteries of extremities with intermittent claudication, bilateral legs 01/22/2020    Bladder cancer 2004    CAD (coronary artery disease) 01/22/2020    COPD (chronic obstructive pulmonary disease) 01/22/2020    Coronary artery disease     Diabetes mellitus     Diverticulitis     DVT OF PROXIMAL LOWER LIMB 06/2022    RIGHT LOWER LEG/CHRONIC    GERD (gastroesophageal reflux disease)     Heart attack 2007    History of COVID-19 09/2022    Hx of colonic polyps     Hyperlipidemia 01/22/2020    Hypertension 01/22/2020    Long term (current) use of anticoagulants 08/19/2022    Onychomycosis     PAD (peripheral artery disease)     PVD (peripheral vascular disease)     Seizures     12/2023, 4/2024/ STARTED KEPPRA AFTER LAST SEIZURE    Sleep apnea     RECENT DIAGNOSIS NO MACHINE, STILL NEEDS TO F/U W/PCP    Slow to wake up after anesthesia        Allergies:  Allergies   Allergen Reactions    Penicillins Anaphylaxis        Objective    Objective       Vitals:   Heart Rate:  [78] 78  BP: (129)/(76) 129/76  Body mass index is 36.95 kg/m².     PHYSICAL EXAM:    General Appearance:   well developed  well nourished  HENT:   oropharynx moist  lips not cyanotic  Neck:  thyroid not enlarged  supple  Respiratory:  no respiratory distress  normal breath sounds  no rales  Cardiovascular:  no jugular venous distention  regular rhythm  apical impulse normal  S1 normal, S2 normal  no S3, no S4   no murmur  no rub, no thrill  carotid pulses normal; no bruit  pedal pulses normal  lower extremity edema: none    Skin:   warm, dry  Psychiatric:  judgement and insight appropriate  normal mood and affect        Result Review:  I have personally reviewed the available results from  [x]  Laboratory  [x]  EKG  [x]  Cardiology  [x]  Medications  [x]  Old records  []  Other:     Procedures       Impression/Plan:  1.  Mixed hyperlipidemia: Continue Lipitor 80 mg once a day.  Monitor lipid and hepatic profile.  2.  Coronary disease status post CABG stable: Continue aspirin 81 mg once a day.  Continue Toprol- mg once a day.  No chest pain.  3.  Peripheral vascular disease stable: Continue aspirin 81 mg once a day.  4.  Chronic DVT: Continue Eliquis 5 mg twice a day.           Rodolfo Nicole MD   09/13/24   12:14 EDT

## 2024-09-13 ENCOUNTER — OFFICE VISIT (OUTPATIENT)
Dept: CARDIOLOGY | Facility: CLINIC | Age: 69
End: 2024-09-13
Payer: MEDICARE

## 2024-09-13 VITALS
SYSTOLIC BLOOD PRESSURE: 129 MMHG | DIASTOLIC BLOOD PRESSURE: 76 MMHG | WEIGHT: 189.2 LBS | HEIGHT: 60 IN | HEART RATE: 78 BPM | BODY MASS INDEX: 37.15 KG/M2

## 2024-09-13 DIAGNOSIS — I25.10 CORONARY ARTERY DISEASE INVOLVING NATIVE CORONARY ARTERY OF NATIVE HEART WITHOUT ANGINA PECTORIS: Primary | ICD-10-CM

## 2024-09-13 DIAGNOSIS — E78.2 HYPERLIPEMIA, MIXED: ICD-10-CM

## 2024-09-13 PROCEDURE — 3078F DIAST BP <80 MM HG: CPT | Performed by: SPECIALIST

## 2024-09-13 PROCEDURE — 3074F SYST BP LT 130 MM HG: CPT | Performed by: SPECIALIST

## 2024-09-13 PROCEDURE — 1160F RVW MEDS BY RX/DR IN RCRD: CPT | Performed by: SPECIALIST

## 2024-09-13 PROCEDURE — 99214 OFFICE O/P EST MOD 30 MIN: CPT | Performed by: SPECIALIST

## 2024-09-13 PROCEDURE — 1159F MED LIST DOCD IN RCRD: CPT | Performed by: SPECIALIST

## 2024-12-23 ENCOUNTER — TRANSCRIBE ORDERS (OUTPATIENT)
Dept: ADMINISTRATIVE | Facility: HOSPITAL | Age: 69
End: 2024-12-23
Payer: MEDICARE

## 2024-12-23 DIAGNOSIS — Z87.891 FORMER SMOKER: Primary | ICD-10-CM

## 2024-12-30 ENCOUNTER — HOSPITAL ENCOUNTER (OUTPATIENT)
Dept: CT IMAGING | Facility: HOSPITAL | Age: 69
Discharge: HOME | End: 2024-12-30
Admitting: FAMILY MEDICINE
Payer: MEDICARE

## 2024-12-30 DIAGNOSIS — Z87.891 FORMER SMOKER: ICD-10-CM

## 2024-12-30 PROCEDURE — 71271 CT THORAX LUNG CANCER SCR C-: CPT

## 2025-01-14 ENCOUNTER — TELEPHONE (OUTPATIENT)
Dept: GASTROENTEROLOGY | Facility: CLINIC | Age: 70
End: 2025-01-14
Payer: MEDICARE

## 2025-01-14 NOTE — TELEPHONE ENCOUNTER
Patient called to get scheduled for his 5 year colon recall. He is scheduled may 5th at 11:00AM. Recall is due July 2025.

## 2025-02-27 ENCOUNTER — TELEPHONE (OUTPATIENT)
Dept: CARDIOLOGY | Facility: CLINIC | Age: 70
End: 2025-02-27
Payer: MEDICARE

## 2025-02-27 NOTE — TELEPHONE ENCOUNTER
[facility] received a fax that requires your attention. The document has been indexed to the patient’s chart for your review.      Reason for sending: EXTERNAL MEDICAL RECORD NOTIFICATION     Documents Description: CARDIAC CLEARANCE REQ-[facility]-2.27.25    Name of Sender: [facility]     Date Indexed: 2.27.25

## 2025-02-27 NOTE — TELEPHONE ENCOUNTER
Procedure:Laparoscopic Cholecystectomy    Med Directive:Eliquis, Aspirin    PMH:CAD, MARIBEL    Last Seen:09/13/2024

## 2025-03-13 NOTE — PROGRESS NOTES
[facility]  Cardiology progress Note    Patient Name: Yehuda Weaver  : 1955    CHIEF COMPLAINT  CAD        Subjective   Subjective     HISTORY OF PRESENT ILLNESS    Yehuda Weaver is a 69 y.o. male with CAD.  No chest pain.  He has some shortness of breath on exertion.  Reports to get gallbladder surgery.    REVIEW OF SYSTEMS    Constitutional:    No fever, no weight loss  Skin:     No rash  Otolaryngeal:    No difficulty swallowing  Cardiovascular: See HPI.  Pulmonary:    No cough, no sputum production    Personal History     Social History:    reports that he quit smoking about 2 years ago. His smoking use included cigarettes. He started smoking about 56 years ago. He has a 27 pack-year smoking history. He has never used smokeless tobacco. He reports that he does not currently use alcohol. He reports that he does not use drugs.    Home Medications:  Current Outpatient Medications on File Prior to Visit   Medication Sig    albuterol (PROVENTIL) (2.5 MG/3ML) 0.083% nebulizer solution Take 2.5 mg by nebulization Every 4 (Four) Hours As Needed for Wheezing.    albuterol sulfate  (90 Base) MCG/ACT inhaler Inhale 2 puffs Every 4 (Four) Hours As Needed for Wheezing.    Aspirin Low Dose 81 MG EC tablet Take 1 tablet by mouth Daily.    atorvastatin (LIPITOR) 80 MG tablet Take 1 tablet by mouth Every Night.    cholecalciferol (VITAMIN D3) 250 MCG (96489 UT) capsule Take 1 capsule by mouth Daily.    dilTIAZem XR (DILACOR XR) 240 MG 24 hr capsule Take 1 capsule by mouth Daily.    doxazosin (CARDURA) 4 MG tablet Take 0.5 tablets by mouth Every Night.    Eliquis 5 MG tablet tablet Take 1 tablet by mouth 2 (Two) Times a Day. PT STATES HOLDING FOR 2 DAYS PRIOR TO SURGERY    FeroSul 325 (65 Fe) MG tablet Take 1 tablet by mouth Daily.    fluticasone (FLONASE) 50 MCG/ACT nasal spray 1 spray into the nostril(s) as directed by provider Every Night.    Fluticasone-Salmeterol (ADVAIR DISKUS IN) Inhale 1  Inhalation Daily As Needed.    Jardiance 10 MG tablet tablet     levETIRAcetam (KEPPRA) 750 MG tablet Take 1 tablet by mouth Every 12 (Twelve) Hours for 30 days.    lisinopril-hydrochlorothiazide (PRINZIDE,ZESTORETIC) 20-25 MG per tablet Take 1 tablet by mouth Daily.    metFORMIN ER (GLUCOPHAGE-XR) 500 MG 24 hr tablet Take 2 tablets by mouth Every 12 (Twelve) Hours. HOLDING FOR 24 HOURS PRIOR TO SURGERY PER MD GUIDELINE    metoprolol succinate XL (TOPROL-XL) 100 MG 24 hr tablet Take 1 tablet by mouth Daily.    montelukast (SINGULAIR) 10 MG tablet Take 1 tablet by mouth Daily.    multivitamin with minerals (One-A-Day Mens 50+ Advantage) tablet tablet Take 1 tablet by mouth Daily. HOLDING FOR DOS    Omega-3 Fatty Acids (fish oil) 1200 MG capsule capsule Take 1 capsule by mouth Daily. HOLDING FOR DOS    omeprazole (priLOSEC) 40 MG capsule Take 1 capsule by mouth As Needed.     No current facility-administered medications on file prior to visit.       Past Medical History:   Diagnosis Date    Arterial stent thrombosis     Arthritis     Asthma     Atherosclerosis of native arteries of extremities with intermittent claudication, bilateral legs 01/22/2020    Bladder cancer 2004    CAD (coronary artery disease) 01/22/2020    COPD (chronic obstructive pulmonary disease) 01/22/2020    Coronary artery disease     Diabetes mellitus     Diverticulitis     DVT OF PROXIMAL LOWER LIMB 06/2022    RIGHT LOWER LEG/CHRONIC    GERD (gastroesophageal reflux disease)     Heart attack 2007    History of COVID-19 09/2022    Hx of colonic polyps     Hyperlipidemia 01/22/2020    Hypertension 01/22/2020    Long term (current) use of anticoagulants 08/19/2022    Onychomycosis     PAD (peripheral artery disease)     PVD (peripheral vascular disease)     Seizures     12/2023, 4/2024/ STARTED KEPPRA AFTER LAST SEIZURE    Sleep apnea     RECENT DIAGNOSIS NO MACHINE, STILL NEEDS TO F/U W/PCP    Slow to wake up after anesthesia         Allergies:  Allergies   Allergen Reactions    Penicillins Anaphylaxis       Objective    Objective       Vitals:   Heart Rate:  [71] 71  BP: (133)/(84) 133/84  Body mass index is 34.96 kg/m².     PHYSICAL EXAM:    General Appearance:   well developed  well nourished  HENT:   oropharynx moist  lips not cyanotic  Neck:  thyroid not enlarged  supple  Respiratory:  no respiratory distress  normal breath sounds  no rales  Cardiovascular:  no jugular venous distention  regular rhythm  apical impulse normal  S1 normal, S2 normal  no S3, no S4   no murmur  no rub, no thrill  carotid pulses normal; no bruit  pedal pulses normal  lower extremity edema: none    Skin:   warm, dry  Psychiatric:  judgement and insight appropriate  normal mood and affect        Result Review:  I have personally reviewed the available results from  [x]  Laboratory  [x]  EKG  [x]  Cardiology  [x]  Medications  [x]  Old records  []  Other:     Procedures       Impression/Plan:  1.  Mixed hyperlipidemia: Continue Lipitor 80 mg once a day.  Monitor lipid and hepatic profile.  2.  Coronary disease status post CABG stable: Continue aspirin 81 mg once a day.  Continue Toprol- mg once a day.  Lexiscan sestamibi stress test to evaluate for ischemia.  3.  Peripheral vascular disease stable: Continue aspirin 81 mg once a day.  4.  Chronic DVT: Continue Eliquis 5 mg twice a day.  5.  Shortness of breath: Echocardiogram.  6.  Essential hypertension controlled: Continue Prinzide 20/25 mg once a day.           Rodolfo Nicole MD   03/14/25   11:56 EDT

## 2025-03-14 ENCOUNTER — OFFICE VISIT (OUTPATIENT)
Dept: CARDIOLOGY | Facility: CLINIC | Age: 70
End: 2025-03-14
Payer: MEDICARE

## 2025-03-14 VITALS
SYSTOLIC BLOOD PRESSURE: 133 MMHG | DIASTOLIC BLOOD PRESSURE: 84 MMHG | WEIGHT: 179 LBS | HEIGHT: 60 IN | HEART RATE: 71 BPM | BODY MASS INDEX: 35.14 KG/M2

## 2025-03-14 DIAGNOSIS — I25.10 CORONARY ARTERY DISEASE INVOLVING NATIVE CORONARY ARTERY OF NATIVE HEART WITHOUT ANGINA PECTORIS: Primary | ICD-10-CM

## 2025-03-14 DIAGNOSIS — E78.2 HYPERLIPEMIA, MIXED: ICD-10-CM

## 2025-03-14 DIAGNOSIS — R06.02 SHORTNESS OF BREATH: ICD-10-CM

## 2025-03-14 DIAGNOSIS — I10 HYPERTENSION, ESSENTIAL: ICD-10-CM

## 2025-03-14 DIAGNOSIS — Z95.1 HX OF CABG: ICD-10-CM

## 2025-03-14 PROCEDURE — 3079F DIAST BP 80-89 MM HG: CPT | Performed by: SPECIALIST

## 2025-03-14 PROCEDURE — 3075F SYST BP GE 130 - 139MM HG: CPT | Performed by: SPECIALIST

## 2025-03-14 PROCEDURE — 99214 OFFICE O/P EST MOD 30 MIN: CPT | Performed by: SPECIALIST

## 2025-03-19 ENCOUNTER — TELEPHONE (OUTPATIENT)
Dept: CARDIOLOGY | Facility: CLINIC | Age: 70
End: 2025-03-19
Payer: MEDICARE

## 2025-03-19 NOTE — TELEPHONE ENCOUNTER
"[facility] received a fax that requires your attention. The document has been indexed to the patient’s chart for your review.      Reason for sending: EXTERNAL MEDICAL RECORD NOTIFICATION     Documents Description: [facility]    Name of Sender: [facility]     Date Indexed: 3.19.25    \"ANESTHESIOLOGISTS WILL NOT CLEAR UNTIL AFTER STRESS TEST THAT IS SCHEDULED FOR 3.30.25. ONCE THAT IS DONE WE WILL NEED TO OBTAIN A NEW CLEARANCE THAT IS IN THIS FAX.\"   "

## 2025-03-31 ENCOUNTER — HOSPITAL ENCOUNTER (OUTPATIENT)
Facility: HOSPITAL | Age: 70
Discharge: HOME | End: 2025-03-31
Payer: MEDICARE

## 2025-03-31 DIAGNOSIS — I25.10 CORONARY ARTERY DISEASE INVOLVING NATIVE CORONARY ARTERY OF NATIVE HEART WITHOUT ANGINA PECTORIS: ICD-10-CM

## 2025-03-31 DIAGNOSIS — Z95.1 HX OF CABG: ICD-10-CM

## 2025-03-31 DIAGNOSIS — R06.02 SHORTNESS OF BREATH: ICD-10-CM

## 2025-03-31 LAB
AORTIC DIMENSIONLESS INDEX: 0.97 (DI)
ASCENDING AORTA: 2.7 CM
AV MEAN PRESS GRAD SYS DOP V1V2: 4 MMHG
AV VMAX SYS DOP: 123 CM/SEC
BH CV ECHO MEAS - AO MAX PG: 6.1 MMHG
BH CV ECHO MEAS - AO ROOT DIAM: 3.3 CM
BH CV ECHO MEAS - AO V2 VTI: 18.2 CM
BH CV ECHO MEAS - AVA(I,D): 3.1 CM2
BH CV ECHO MEAS - EDV(CUBED): 54.9 ML
BH CV ECHO MEAS - EDV(MOD-SP2): 45.1 ML
BH CV ECHO MEAS - EDV(MOD-SP4): 54.2 ML
BH CV ECHO MEAS - EF(MOD-SP2): 59 %
BH CV ECHO MEAS - EF(MOD-SP4): 58.1 %
BH CV ECHO MEAS - ESV(CUBED): 15.6 ML
BH CV ECHO MEAS - ESV(MOD-SP2): 18.5 ML
BH CV ECHO MEAS - ESV(MOD-SP4): 22.7 ML
BH CV ECHO MEAS - FS: 34.2 %
BH CV ECHO MEAS - IVS/LVPW: 1 CM
BH CV ECHO MEAS - IVSD: 1.3 CM
BH CV ECHO MEAS - LA DIMENSION: 3.1 CM
BH CV ECHO MEAS - LAT PEAK E' VEL: 7.5 CM/SEC
BH CV ECHO MEAS - LV DIASTOLIC VOL/BSA (35-75): 30.4 CM2
BH CV ECHO MEAS - LV MASS(C)D: 173.1 GRAMS
BH CV ECHO MEAS - LV MAX PG: 4.7 MMHG
BH CV ECHO MEAS - LV MEAN PG: 3 MMHG
BH CV ECHO MEAS - LV SYSTOLIC VOL/BSA (12-30): 12.7 CM2
BH CV ECHO MEAS - LV V1 MAX: 108 CM/SEC
BH CV ECHO MEAS - LV V1 VTI: 17.7 CM
BH CV ECHO MEAS - LVIDD: 3.8 CM
BH CV ECHO MEAS - LVIDS: 2.5 CM
BH CV ECHO MEAS - LVOT AREA: 3.1 CM2
BH CV ECHO MEAS - LVOT DIAM: 2 CM
BH CV ECHO MEAS - LVPWD: 1.3 CM
BH CV ECHO MEAS - MED PEAK E' VEL: 5.8 CM/SEC
BH CV ECHO MEAS - MV A MAX VEL: 76 CM/SEC
BH CV ECHO MEAS - MV DEC SLOPE: 276 CM/SEC2
BH CV ECHO MEAS - MV DEC TIME: 0.25 SEC
BH CV ECHO MEAS - MV E MAX VEL: 66.7 CM/SEC
BH CV ECHO MEAS - MV E/A: 0.88
BH CV ECHO MEAS - RVDD: 3.5 CM
BH CV ECHO MEAS - SV(LVOT): 55.6 ML
BH CV ECHO MEAS - SV(MOD-SP2): 26.6 ML
BH CV ECHO MEAS - SV(MOD-SP4): 31.5 ML
BH CV ECHO MEAS - SVI(LVOT): 31.2 ML/M2
BH CV ECHO MEAS - SVI(MOD-SP2): 14.9 ML/M2
BH CV ECHO MEAS - SVI(MOD-SP4): 17.7 ML/M2
BH CV ECHO MEASUREMENTS AVERAGE E/E' RATIO: 10.03
BH CV IMMEDIATE POST RECOVERY TECH DATA SYMPTOMS: NORMAL
BH CV IMMEDIATE POST TECH DATA BLOOD PRESSURE: NORMAL MMHG
BH CV IMMEDIATE POST TECH DATA HEART RATE: 126 BPM
BH CV IMMEDIATE POST TECH DATA OXYGEN SATS: 96 %
BH CV REST NUCLEAR ISOTOPE DOSE: 9 MCI
BH CV SIX MINUTE RECOVERY TECH DATA BLOOD PRESSURE: NORMAL
BH CV SIX MINUTE RECOVERY TECH DATA HEART RATE: 114 BPM
BH CV SIX MINUTE RECOVERY TECH DATA OXYGEN SATURATION: 94 %
BH CV SIX MINUTE RECOVERY TECH DATA SYMPTOMS: NORMAL
BH CV STRESS BP STAGE 1: NORMAL
BH CV STRESS COMMENTS STAGE 1: NORMAL
BH CV STRESS DOSE REGADENOSON STAGE 1: 0.4
BH CV STRESS DURATION MIN STAGE 1: 0
BH CV STRESS DURATION SEC STAGE 1: 10
BH CV STRESS HR STAGE 1: 120
BH CV STRESS NUCLEAR ISOTOPE DOSE: 33.9 MCI
BH CV STRESS O2 STAGE 1: 93
BH CV STRESS PROTOCOL 1: NORMAL
BH CV STRESS RECOVERY BP: NORMAL MMHG
BH CV STRESS RECOVERY HR: 114 BPM
BH CV STRESS RECOVERY O2: 94 %
BH CV STRESS STAGE 1: 1
BH CV THREE MINUTE POST TECH DATA BLOOD PRESSURE: NORMAL MMHG
BH CV THREE MINUTE POST TECH DATA HEART RATE: 118 BPM
BH CV THREE MINUTE POST TECH DATA OXYGEN SATURATION: 94 %
BH CV THREE MINUTE RECOVERY TECH DATA SYMPTOM: NORMAL
IVRT: 71 MS
LEFT ATRIUM VOLUME INDEX: 16.7 ML/M2
LV EF BIPLANE MOD: 60.6 %
MAXIMAL PREDICTED HEART RATE: 151 BPM
PERCENT MAX PREDICTED HR: 79.47 %
SPECT HRT GATED+EF W RNC IV: 57 %
STRESS BASELINE BP: NORMAL MMHG
STRESS BASELINE HR: 99 BPM
STRESS O2 SAT REST: 91 %
STRESS PERCENT HR: 93 %
STRESS POST O2 SAT PEAK: 93 %
STRESS POST PEAK BP: NORMAL MMHG
STRESS POST PEAK HR: 120 BPM
STRESS TARGET HR: 128 BPM

## 2025-03-31 PROCEDURE — 93018 CV STRESS TEST I&R ONLY: CPT | Performed by: SPECIALIST

## 2025-03-31 PROCEDURE — 34310000005 TECHNETIUM TETROFOSMIN KIT: Performed by: SPECIALIST

## 2025-03-31 PROCEDURE — 25010000002 REGADENOSON 0.4 MG/5ML SOLUTION: Performed by: SPECIALIST

## 2025-03-31 PROCEDURE — 93306 TTE W/DOPPLER COMPLETE: CPT

## 2025-03-31 PROCEDURE — 93016 CV STRESS TEST SUPVJ ONLY: CPT | Performed by: NURSE PRACTITIONER

## 2025-03-31 PROCEDURE — 78452 HT MUSCLE IMAGE SPECT MULT: CPT

## 2025-03-31 PROCEDURE — 93306 TTE W/DOPPLER COMPLETE: CPT | Performed by: SPECIALIST

## 2025-03-31 PROCEDURE — 93017 CV STRESS TEST TRACING ONLY: CPT

## 2025-03-31 PROCEDURE — 78452 HT MUSCLE IMAGE SPECT MULT: CPT | Performed by: SPECIALIST

## 2025-03-31 PROCEDURE — A9502 TC99M TETROFOSMIN: HCPCS | Performed by: SPECIALIST

## 2025-03-31 RX ORDER — REGADENOSON 0.08 MG/ML
0.4 INJECTION, SOLUTION INTRAVENOUS
Status: COMPLETED | OUTPATIENT
Start: 2025-03-31 | End: 2025-03-31

## 2025-03-31 RX ADMIN — REGADENOSON 0.4 MG: 0.08 INJECTION, SOLUTION INTRAVENOUS at 09:11

## 2025-03-31 RX ADMIN — TETROFOSMIN 1 DOSE: 1.38 INJECTION, POWDER, LYOPHILIZED, FOR SOLUTION INTRAVENOUS at 08:07

## 2025-03-31 RX ADMIN — TETROFOSMIN 1 DOSE: 1.38 INJECTION, POWDER, LYOPHILIZED, FOR SOLUTION INTRAVENOUS at 09:11

## 2025-04-01 ENCOUNTER — RESULTS FOLLOW-UP (OUTPATIENT)
Dept: CARDIOLOGY | Facility: CLINIC | Age: 70
End: 2025-04-01
Payer: MEDICARE

## 2025-05-05 ENCOUNTER — TELEPHONE (OUTPATIENT)
Dept: GASTROENTEROLOGY | Facility: CLINIC | Age: 70
End: 2025-05-05
Payer: MEDICARE

## 2025-05-14 ENCOUNTER — APPOINTMENT (OUTPATIENT)
Dept: GENERAL RADIOLOGY | Facility: HOSPITAL | Age: 70
DRG: 071 | End: 2025-05-14
Payer: MEDICARE

## 2025-05-14 ENCOUNTER — HOSPITAL ENCOUNTER (INPATIENT)
Facility: HOSPITAL | Age: 70
LOS: 2 days | Discharge: HOME | DRG: 071 | End: 2025-05-16
Attending: EMERGENCY MEDICINE | Admitting: STUDENT IN AN ORGANIZED HEALTH CARE EDUCATION/TRAINING PROGRAM
Payer: MEDICARE

## 2025-05-14 ENCOUNTER — APPOINTMENT (OUTPATIENT)
Dept: CT IMAGING | Facility: HOSPITAL | Age: 70
DRG: 071 | End: 2025-05-14
Payer: MEDICARE

## 2025-05-14 DIAGNOSIS — Z78.9 IMPAIRED MOBILITY AND ADLS: ICD-10-CM

## 2025-05-14 DIAGNOSIS — R56.9 SEIZURE: ICD-10-CM

## 2025-05-14 DIAGNOSIS — R26.2 DIFFICULTY WALKING: ICD-10-CM

## 2025-05-14 DIAGNOSIS — R73.9 HYPERGLYCEMIA: ICD-10-CM

## 2025-05-14 DIAGNOSIS — Z87.898 HISTORY OF SEIZURE: ICD-10-CM

## 2025-05-14 DIAGNOSIS — R41.82 ALTERED MENTAL STATUS, UNSPECIFIED ALTERED MENTAL STATUS TYPE: ICD-10-CM

## 2025-05-14 DIAGNOSIS — E87.0 HYPEROSMOLAR SYNDROME: Primary | ICD-10-CM

## 2025-05-14 DIAGNOSIS — Z74.09 IMPAIRED MOBILITY AND ADLS: ICD-10-CM

## 2025-05-14 PROBLEM — G93.41 ACUTE METABOLIC ENCEPHALOPATHY: Status: ACTIVE | Noted: 2025-05-14

## 2025-05-14 LAB
ACETONE BLD QL: NEGATIVE
ALBUMIN SERPL-MCNC: 4.6 G/DL (ref 3.5–5.2)
ALBUMIN/GLOB SERPL: 1.5 G/DL
ALP SERPL-CCNC: 78 U/L (ref 39–117)
ALT SERPL W P-5'-P-CCNC: 26 U/L (ref 1–41)
AMMONIA BLD-SCNC: 14 UMOL/L (ref 16–60)
ANION GAP SERPL CALCULATED.3IONS-SCNC: 19 MMOL/L (ref 5–15)
ARTERIAL PATENCY WRIST A: POSITIVE
AST SERPL-CCNC: 19 U/L (ref 1–40)
ATMOSPHERIC PRESS: 737.8 MMHG
ATMOSPHERIC PRESS: 738 MMHG
BASE EXCESS BLDA CALC-SCNC: -2.5 MMOL/L (ref -2–2)
BASE EXCESS BLDV CALC-SCNC: -2.5 MMOL/L (ref -2–2)
BASOPHILS # BLD AUTO: 0.04 10*3/MM3 (ref 0–0.2)
BASOPHILS NFR BLD AUTO: 0.3 % (ref 0–1.5)
BDY SITE: ABNORMAL
BILIRUB SERPL-MCNC: 0.6 MG/DL (ref 0–1.2)
BILIRUB UR QL STRIP: NEGATIVE
BUN SERPL-MCNC: 26 MG/DL (ref 8–23)
BUN/CREAT SERPL: 20.5 (ref 7–25)
CA-I BLDA-SCNC: 1.09 MMOL/L (ref 1.13–1.32)
CALCIUM SPEC-SCNC: 9.4 MG/DL (ref 8.6–10.5)
CHLORIDE BLDA-SCNC: 102 MMOL/L (ref 98–107)
CHLORIDE SERPL-SCNC: 94 MMOL/L (ref 98–107)
CLARITY UR: CLEAR
CO2 SERPL-SCNC: 19 MMOL/L (ref 22–29)
COLOR UR: YELLOW
CREAT SERPL-MCNC: 1.27 MG/DL (ref 0.76–1.27)
D-LACTATE SERPL-SCNC: 1.2 MMOL/L
DEPRECATED RDW RBC AUTO: 41.3 FL (ref 37–54)
EGFRCR SERPLBLD CKD-EPI 2021: 61.2 ML/MIN/1.73
EOSINOPHIL # BLD AUTO: 0 10*3/MM3 (ref 0–0.4)
EOSINOPHIL NFR BLD AUTO: 0 % (ref 0.3–6.2)
ERYTHROCYTE [DISTWIDTH] IN BLOOD BY AUTOMATED COUNT: 12.8 % (ref 12.3–15.4)
ETHANOL BLD-MCNC: <10 MG/DL (ref 0–10)
ETHANOL UR QL: <0.01 %
GEN 5 1HR TROPONIN T REFLEX: 23 NG/L
GLOBULIN UR ELPH-MCNC: 3 GM/DL
GLUCOSE BLDC GLUCOMTR-MCNC: 297 MG/DL (ref 70–99)
GLUCOSE BLDC GLUCOMTR-MCNC: 298 MG/DL (ref 70–99)
GLUCOSE BLDC GLUCOMTR-MCNC: 326 MG/DL (ref 70–99)
GLUCOSE BLDC GLUCOMTR-MCNC: 392 MG/DL (ref 70–99)
GLUCOSE BLDC GLUCOMTR-MCNC: 400 MG/DL (ref 70–99)
GLUCOSE BLDC GLUCOMTR-MCNC: 407 MG/DL (ref 70–99)
GLUCOSE BLDC GLUCOMTR-MCNC: 455 MG/DL (ref 70–99)
GLUCOSE SERPL-MCNC: 461 MG/DL (ref 65–99)
GLUCOSE UR STRIP-MCNC: ABNORMAL MG/DL
HBA1C MFR BLD: 15.5 % (ref 4.8–5.6)
HCO3 BLDA-SCNC: 20.9 MMOL/L (ref 22–26)
HCO3 BLDV-SCNC: 21 MMOL/L (ref 22–26)
HCT VFR BLD AUTO: 43.2 % (ref 37.5–51)
HCT VFR BLD CALC: 41 % (ref 38–51)
HEMODILUTION: NO
HGB BLD-MCNC: 15.1 G/DL (ref 13–17.7)
HGB BLDA-MCNC: 14.1 G/DL (ref 12–18)
HGB BLDA-MCNC: 14.9 G/DL (ref 12–18)
HGB UR QL STRIP.AUTO: NEGATIVE
HOLD SPECIMEN: NORMAL
HOLD SPECIMEN: NORMAL
IMM GRANULOCYTES # BLD AUTO: 0.04 10*3/MM3 (ref 0–0.05)
IMM GRANULOCYTES NFR BLD AUTO: 0.3 % (ref 0–0.5)
KETONES UR QL STRIP: ABNORMAL
LEUKOCYTE ESTERASE UR QL STRIP.AUTO: NEGATIVE
LYMPHOCYTES # BLD AUTO: 1.35 10*3/MM3 (ref 0.7–3.1)
LYMPHOCYTES NFR BLD AUTO: 11.4 % (ref 19.6–45.3)
MCH RBC QN AUTO: 30.9 PG (ref 26.6–33)
MCHC RBC AUTO-ENTMCNC: 35 G/DL (ref 31.5–35.7)
MCV RBC AUTO: 88.3 FL (ref 79–97)
MODALITY: ABNORMAL
MODALITY: ABNORMAL
MONOCYTES # BLD AUTO: 0.53 10*3/MM3 (ref 0.1–0.9)
MONOCYTES NFR BLD AUTO: 4.5 % (ref 5–12)
NEUTROPHILS NFR BLD AUTO: 83.5 % (ref 42.7–76)
NEUTROPHILS NFR BLD AUTO: 9.87 10*3/MM3 (ref 1.7–7)
NITRITE UR QL STRIP: NEGATIVE
NRBC BLD AUTO-RTO: 0 /100 WBC (ref 0–0.2)
OSMOLALITY SERPL: 307 MOSM/KG (ref 280–301)
PCO2 BLDA: 31.7 MM HG (ref 35–45)
PCO2 BLDV: 32.3 MM HG (ref 41–51)
PH BLDA: 7.43 PH UNITS (ref 7.35–7.45)
PH BLDV: 7.42 PH UNITS (ref 7.31–7.41)
PH UR STRIP.AUTO: <=5 [PH] (ref 5–8)
PLATELET # BLD AUTO: 216 10*3/MM3 (ref 140–450)
PMV BLD AUTO: 11.4 FL (ref 6–12)
PO2 BLDA: 76 MM HG (ref 80–100)
PO2 BLDV: 47 MM HG (ref 35–42)
POTASSIUM BLDA-SCNC: 3.9 MMOL/L (ref 3.5–5)
POTASSIUM SERPL-SCNC: 4.4 MMOL/L (ref 3.5–5.2)
PROT SERPL-MCNC: 7.6 G/DL (ref 6–8.5)
PROT UR QL STRIP: NEGATIVE
RBC # BLD AUTO: 4.89 10*6/MM3 (ref 4.14–5.8)
SAO2 % BLDCOA: 95.6 % (ref 95–99)
SAO2 % BLDCOV: 84 % (ref 45–75)
SODIUM BLD-SCNC: 134 MMOL/L (ref 131–143)
SODIUM SERPL-SCNC: 132 MMOL/L (ref 136–145)
SP GR UR STRIP: 1.03 (ref 1–1.03)
TROPONIN T % DELTA: -4
TROPONIN T NUMERIC DELTA: -1 NG/L
TROPONIN T SERPL HS-MCNC: 24 NG/L
TSH SERPL DL<=0.05 MIU/L-ACNC: 1.41 UIU/ML (ref 0.27–4.2)
UROBILINOGEN UR QL STRIP: ABNORMAL
WBC NRBC COR # BLD AUTO: 11.83 10*3/MM3 (ref 3.4–10.8)
WHOLE BLOOD HOLD COAG: NORMAL
WHOLE BLOOD HOLD SPECIMEN: NORMAL

## 2025-05-14 PROCEDURE — 63710000001 INSULIN LISPRO (HUMAN) PER 5 UNITS: Performed by: STUDENT IN AN ORGANIZED HEALTH CARE EDUCATION/TRAINING PROGRAM

## 2025-05-14 PROCEDURE — 80053 COMPREHEN METABOLIC PANEL: CPT | Performed by: EMERGENCY MEDICINE

## 2025-05-14 PROCEDURE — 82948 REAGENT STRIP/BLOOD GLUCOSE: CPT

## 2025-05-14 PROCEDURE — 81003 URINALYSIS AUTO W/O SCOPE: CPT | Performed by: EMERGENCY MEDICINE

## 2025-05-14 PROCEDURE — 83605 ASSAY OF LACTIC ACID: CPT

## 2025-05-14 PROCEDURE — 82803 BLOOD GASES ANY COMBINATION: CPT

## 2025-05-14 PROCEDURE — 25810000003 SODIUM CHLORIDE 0.9 % SOLUTION: Performed by: EMERGENCY MEDICINE

## 2025-05-14 PROCEDURE — 87040 BLOOD CULTURE FOR BACTERIA: CPT | Performed by: STUDENT IN AN ORGANIZED HEALTH CARE EDUCATION/TRAINING PROGRAM

## 2025-05-14 PROCEDURE — 71045 X-RAY EXAM CHEST 1 VIEW: CPT

## 2025-05-14 PROCEDURE — 82077 ASSAY SPEC XCP UR&BREATH IA: CPT | Performed by: EMERGENCY MEDICINE

## 2025-05-14 PROCEDURE — 82948 REAGENT STRIP/BLOOD GLUCOSE: CPT | Performed by: EMERGENCY MEDICINE

## 2025-05-14 PROCEDURE — 99285 EMERGENCY DEPT VISIT HI MDM: CPT

## 2025-05-14 PROCEDURE — 80051 ELECTROLYTE PANEL: CPT

## 2025-05-14 PROCEDURE — 63710000001 INSULIN REGULAR HUMAN PER 5 UNITS: Performed by: STUDENT IN AN ORGANIZED HEALTH CARE EDUCATION/TRAINING PROGRAM

## 2025-05-14 PROCEDURE — 82140 ASSAY OF AMMONIA: CPT | Performed by: EMERGENCY MEDICINE

## 2025-05-14 PROCEDURE — 83930 ASSAY OF BLOOD OSMOLALITY: CPT | Performed by: EMERGENCY MEDICINE

## 2025-05-14 PROCEDURE — 84443 ASSAY THYROID STIM HORMONE: CPT | Performed by: STUDENT IN AN ORGANIZED HEALTH CARE EDUCATION/TRAINING PROGRAM

## 2025-05-14 PROCEDURE — 83036 HEMOGLOBIN GLYCOSYLATED A1C: CPT | Performed by: STUDENT IN AN ORGANIZED HEALTH CARE EDUCATION/TRAINING PROGRAM

## 2025-05-14 PROCEDURE — 84156 ASSAY OF PROTEIN URINE: CPT | Performed by: INTERNAL MEDICINE

## 2025-05-14 PROCEDURE — 82009 KETONE BODYS QUAL: CPT | Performed by: EMERGENCY MEDICINE

## 2025-05-14 PROCEDURE — 99222 1ST HOSP IP/OBS MODERATE 55: CPT | Performed by: STUDENT IN AN ORGANIZED HEALTH CARE EDUCATION/TRAINING PROGRAM

## 2025-05-14 PROCEDURE — 80177 DRUG SCRN QUAN LEVETIRACETAM: CPT | Performed by: STUDENT IN AN ORGANIZED HEALTH CARE EDUCATION/TRAINING PROGRAM

## 2025-05-14 PROCEDURE — 36600 WITHDRAWAL OF ARTERIAL BLOOD: CPT

## 2025-05-14 PROCEDURE — 84484 ASSAY OF TROPONIN QUANT: CPT | Performed by: STUDENT IN AN ORGANIZED HEALTH CARE EDUCATION/TRAINING PROGRAM

## 2025-05-14 PROCEDURE — 82570 ASSAY OF URINE CREATININE: CPT | Performed by: INTERNAL MEDICINE

## 2025-05-14 PROCEDURE — 82330 ASSAY OF CALCIUM: CPT

## 2025-05-14 PROCEDURE — 36415 COLL VENOUS BLD VENIPUNCTURE: CPT | Performed by: STUDENT IN AN ORGANIZED HEALTH CARE EDUCATION/TRAINING PROGRAM

## 2025-05-14 PROCEDURE — 70450 CT HEAD/BRAIN W/O DYE: CPT

## 2025-05-14 PROCEDURE — 85025 COMPLETE CBC W/AUTO DIFF WBC: CPT | Performed by: EMERGENCY MEDICINE

## 2025-05-14 RX ORDER — SODIUM CHLORIDE 9 MG/ML
125 INJECTION, SOLUTION INTRAVENOUS CONTINUOUS
Status: ACTIVE | OUTPATIENT
Start: 2025-05-14 | End: 2025-05-15

## 2025-05-14 RX ORDER — SODIUM CHLORIDE 0.9 % (FLUSH) 0.9 %
10 SYRINGE (ML) INJECTION AS NEEDED
Status: DISCONTINUED | OUTPATIENT
Start: 2025-05-14 | End: 2025-05-16 | Stop reason: HOSPADM

## 2025-05-14 RX ORDER — SODIUM CHLORIDE 9 MG/ML
40 INJECTION, SOLUTION INTRAVENOUS AS NEEDED
Status: DISCONTINUED | OUTPATIENT
Start: 2025-05-14 | End: 2025-05-16 | Stop reason: HOSPADM

## 2025-05-14 RX ORDER — IBUPROFEN 600 MG/1
1 TABLET ORAL
Status: DISCONTINUED | OUTPATIENT
Start: 2025-05-14 | End: 2025-05-16 | Stop reason: HOSPADM

## 2025-05-14 RX ORDER — CETIRIZINE HYDROCHLORIDE 10 MG/1
10 TABLET ORAL DAILY
COMMUNITY
Start: 2025-04-13

## 2025-05-14 RX ORDER — POLYETHYLENE GLYCOL 3350 17 G/17G
17 POWDER, FOR SOLUTION ORAL DAILY PRN
Status: DISCONTINUED | OUTPATIENT
Start: 2025-05-14 | End: 2025-05-16 | Stop reason: HOSPADM

## 2025-05-14 RX ORDER — INSULIN LISPRO 100 [IU]/ML
2-7 INJECTION, SOLUTION INTRAVENOUS; SUBCUTANEOUS
Status: DISCONTINUED | OUTPATIENT
Start: 2025-05-14 | End: 2025-05-16 | Stop reason: HOSPADM

## 2025-05-14 RX ORDER — DEXTROSE MONOHYDRATE 25 G/50ML
25 INJECTION, SOLUTION INTRAVENOUS
Status: DISCONTINUED | OUTPATIENT
Start: 2025-05-14 | End: 2025-05-16 | Stop reason: HOSPADM

## 2025-05-14 RX ORDER — BISACODYL 10 MG
10 SUPPOSITORY, RECTAL RECTAL DAILY PRN
Status: DISCONTINUED | OUTPATIENT
Start: 2025-05-14 | End: 2025-05-16 | Stop reason: HOSPADM

## 2025-05-14 RX ORDER — PYRIDOXINE HCL (VITAMIN B6) 25 MG
25 TABLET ORAL DAILY
COMMUNITY
Start: 2025-03-25

## 2025-05-14 RX ORDER — NICOTINE POLACRILEX 4 MG
15 LOZENGE BUCCAL
Status: DISCONTINUED | OUTPATIENT
Start: 2025-05-14 | End: 2025-05-16 | Stop reason: HOSPADM

## 2025-05-14 RX ORDER — AMOXICILLIN 250 MG
2 CAPSULE ORAL 2 TIMES DAILY PRN
Status: DISCONTINUED | OUTPATIENT
Start: 2025-05-14 | End: 2025-05-16 | Stop reason: HOSPADM

## 2025-05-14 RX ORDER — ENOXAPARIN SODIUM 100 MG/ML
40 INJECTION SUBCUTANEOUS DAILY
Status: DISCONTINUED | OUTPATIENT
Start: 2025-05-15 | End: 2025-05-15

## 2025-05-14 RX ORDER — BISACODYL 5 MG/1
5 TABLET, DELAYED RELEASE ORAL DAILY PRN
Status: DISCONTINUED | OUTPATIENT
Start: 2025-05-14 | End: 2025-05-16 | Stop reason: HOSPADM

## 2025-05-14 RX ORDER — SODIUM CHLORIDE 0.9 % (FLUSH) 0.9 %
10 SYRINGE (ML) INJECTION EVERY 12 HOURS SCHEDULED
Status: DISCONTINUED | OUTPATIENT
Start: 2025-05-14 | End: 2025-05-16 | Stop reason: HOSPADM

## 2025-05-14 RX ADMIN — SODIUM CHLORIDE 1000 ML: 9 INJECTION, SOLUTION INTRAVENOUS at 13:21

## 2025-05-14 RX ADMIN — INSULIN LISPRO 4 UNITS: 100 INJECTION, SOLUTION INTRAVENOUS; SUBCUTANEOUS at 18:35

## 2025-05-14 RX ADMIN — SODIUM CHLORIDE 125 ML/HR: 9 INJECTION, SOLUTION INTRAVENOUS at 16:02

## 2025-05-14 RX ADMIN — INSULIN LISPRO 6 UNITS: 100 INJECTION, SOLUTION INTRAVENOUS; SUBCUTANEOUS at 21:17

## 2025-05-14 RX ADMIN — INSULIN HUMAN 5 UNITS: 100 INJECTION, SOLUTION PARENTERAL at 17:10

## 2025-05-14 RX ADMIN — Medication 10 ML: at 21:17

## 2025-05-14 NOTE — ED PROVIDER NOTES
Time: 12:44 PM EDT  Date of encounter:  5/14/2025  Independent Historian/Clinical History and Information was obtained by:   Patient    History is limited by: Altered Mental Status    Chief Complaint: Altered mental status, high blood sugar and possible seizure activity      History of Present Illness:  Patient is a 69 y.o. year old male who presents to the emergency department for evaluation of altered mental status.  Ongoing issue for the past few months with waxing and waning symptoms.  Family is describing some paranoid hallucinations.  Receiving phone call from the patient's 15-20 times a day asking the same questions.  Diagnosed in the past 1 year with epilepsy and currently on medications.  Family states the patient is also lost 30 pounds in the past 2 to 3 months.  The patient himself denies any headache, chest or abdominal pain.  No vomiting or diarrhea.  Family states they are afraid for the patient to go home today because they think he will wander off on his own.  Has recently been somewhat short of air.  Just saw primary care provider today and they state he does not have a urinary tract infection and they did full lab workup.  Had 1 reported absence seizure earlier this morning but none since.  Episode only lasted a few seconds.      Patient Care Team  Primary Care Provider: Koby Hernandez MD    Past Medical History:     Allergies   Allergen Reactions    Penicillins Anaphylaxis     Past Medical History:   Diagnosis Date    Arterial stent thrombosis     Arthritis     Asthma     Atherosclerosis of native arteries of extremities with intermittent claudication, bilateral legs 01/22/2020    Bladder cancer 2004    CAD (coronary artery disease) 01/22/2020    COPD (chronic obstructive pulmonary disease) 01/22/2020    Coronary artery disease     Diabetes mellitus     Diverticulitis     DVT OF PROXIMAL LOWER LIMB 06/2022    RIGHT LOWER LEG/CHRONIC    GERD (gastroesophageal reflux disease)     Heart attack  2007    History of COVID-19 09/2022    Hx of colonic polyps     Hyperlipidemia 01/22/2020    Hypertension 01/22/2020    Long term (current) use of anticoagulants 08/19/2022    Onychomycosis     PAD (peripheral artery disease)     PVD (peripheral vascular disease)     Seizures     12/2023, 4/2024/ STARTED KEPPRA AFTER LAST SEIZURE    Sleep apnea     RECENT DIAGNOSIS NO MACHINE, STILL NEEDS TO F/U W/PCP    Slow to wake up after anesthesia      Past Surgical History:   Procedure Laterality Date    ANGIOPLASTY Right 06/17/2022    fem/pop    APPENDECTOMY  1970    ARTERIOGRAM LOWER EXTREMITY Left 07/10/2023    Procedure: LEFT LOWER EXTERMITY ANGIOGRAM WITH TIBAL ARTERY ANGIOPLASTY;  Surgeon: Donna Bean Jr., MD;  Location: [facility] OR 18/19;  Service: Vascular;  Laterality: Left;    ARTERIOGRAM LOWER EXTREMITY WITH ANGIOPLASTY STENT Left 6/10/2024    Procedure: LEFT LOWER EXTREMITY ARTERIOGRAM WITH  ANGIOPLASTY;  Surgeon: Donna Bean Jr., MD;  Location: [facility] OR;  Service: Vascular;  Laterality: Left;    ATHRECTOMY ILIAC, FEMORAL, TIBIAL ARTERY Left 10/20/2022    Procedure: LEFT SUPERFICIAL FEMORAL ARTERY LASER ATHERECTOMY AND COVERED STENT, ANGIOPLASTY OF POPLITEAL AND TIBIAL PERINEAL TRUNK AND PHARMACOLOGIC THROMBOLYSIS;  Surgeon: Donna Bean Jr., MD;  Location: [facility] OR 18/19;  Service: Vascular;  Laterality: Left;    BACK SURGERY      BLADDER SURGERY  2004    cancer    COLONOSCOPY  07/22/2020    Dr. Castano    CORONARY ARTERY BYPASS GRAFT  2007    Triple coronary bypass    EKOS CATHETER PLACEMENT Right 06/16/2022    Procedure: Right lower extremity arteriogram via left groin approach with placement of thrombolysis infusion catheter;  Surgeon: Donna Bean Jr., MD;  Location: [facility] OR 18/19;  Service: Vascular;  Laterality: Right;    EKOS CATHETER REMOVAL N/A 06/17/2022    Procedure: EKOS CATHETER REMOVAL, RIGHT LOWER EXTREMITY ARTERIOGRAM WITH  PERCUTANEOUS TRANSLUMINAL ANGIOPLASTY, ASPIRATION PNEUMBRA THROMBECTOMY, AND IVUS;  Surgeon: Yehuda Salgado MD;  Location:  [facility] OR 18/19;  Service: Vascular;  Laterality: N/A;    FEMORAL ARTERY STENT Right 06/17/2022    FEMORAL POPLITEAL BYPASS Left 02/02/2023    Procedure: LEFT FEMORAL BELOW KNEE BYPASS INSITU GREATER SAPHENOUS VEIN;  Surgeon: Donna Bean Jr., MD;  Location: [facility] MAIN OR;  Service: Vascular;  Laterality: Left;    HERNIA REPAIR      LEG THROMBECTOMY/EMBOLECTOMY Right 06/17/2022    tibial artery    NECK SURGERY      PERIPHERAL ARTERIAL STENT GRAFT  06/25/2021    UPPER GASTROINTESTINAL ENDOSCOPY  07/22/2020    Dr. Castano    VASCULAR SURGERY Right 2022     Family History   Problem Relation Age of Onset    Heart failure Mother     Heart disease Mother     Diabetes Mother     Clotting disorder Father     Heart attack Father     Sleep apnea Sister     Clotting disorder Sister     Lung cancer Brother         Unsure of age    Stroke Brother     Cancer Son     Colon cancer Neg Hx     Malig Hyperthermia Neg Hx        Home Medications:  Prior to Admission medications    Medication Sig Start Date End Date Taking? Authorizing Provider   albuterol (PROVENTIL) (2.5 MG/3ML) 0.083% nebulizer solution Take 2.5 mg by nebulization Every 4 (Four) Hours As Needed for Wheezing.    Siria Oreilly MD   albuterol sulfate  (90 Base) MCG/ACT inhaler Inhale 2 puffs Every 4 (Four) Hours As Needed for Wheezing.    Siria Oreilly MD   Aspirin Low Dose 81 MG EC tablet Take 1 tablet by mouth Daily. 1/18/23   Siria Oreilly MD   atorvastatin (LIPITOR) 80 MG tablet Take 1 tablet by mouth Every Night. 7/14/21   Siria Oreilly MD   dilTIAZem XR (DILACOR XR) 240 MG 24 hr capsule Take 1 capsule by mouth Daily.    Siria Oreilly MD   doxazosin (CARDURA) 4 MG tablet Take 0.5 tablets by mouth Every Night. 6/9/21   Siria Oreilly MD   Eliquis 5 MG tablet tablet Take  1 tablet by mouth 2 (Two) Times a Day. PT STATES HOLDING FOR 2 DAYS PRIOR TO SURGERY 22   Siria Oreilly MD   FeroSul 325 (65 Fe) MG tablet Take 1 tablet by mouth Daily. 24   Siria Oreilly MD   fluticasone (FLONASE) 50 MCG/ACT nasal spray 1 spray into the nostril(s) as directed by provider Every Night. 9/15/21   Siria Oreilly MD   Fluticasone-Salmeterol (ADVAIR DISKUS IN) Inhale 1 Inhalation Daily As Needed.    Siria Oreilly MD   Jardiance 10 MG tablet tablet  24   Siria Oreilly MD   levETIRAcetam (KEPPRA) 750 MG tablet Take 1 tablet by mouth Every 12 (Twelve) Hours for 30 days. 4/23/24 6/10/24  Emil Barragan MD   lisinopril-hydrochlorothiazide (PRINZIDE,ZESTORETIC) 20-25 MG per tablet Take 1 tablet by mouth Daily. 21   Siria Oreilly MD   metFORMIN ER (GLUCOPHAGE-XR) 500 MG 24 hr tablet Take 2 tablets by mouth Every 12 (Twelve) Hours. HOLDING FOR 24 HOURS PRIOR TO SURGERY PER MD GUIDELINE 24   Siria Oreilly MD   metoprolol succinate XL (TOPROL-XL) 100 MG 24 hr tablet Take 1 tablet by mouth Daily. 21   Siria Oreilly MD   montelukast (SINGULAIR) 10 MG tablet Take 1 tablet by mouth Daily. 21   Siria Oreilly MD   multivitamin with minerals (One-A-Day Mens 50+ Advantage) tablet tablet Take 1 tablet by mouth Daily. HOLDING FOR DOS    Siria Oreilly MD   Omega-3 Fatty Acids (fish oil) 1200 MG capsule capsule Take 1 capsule by mouth Daily. HOLDING FOR DOS    Siria Oreilly MD   omeprazole (priLOSEC) 40 MG capsule Take 1 capsule by mouth As Needed.    Siria Oreilly MD        Social History:   Social History     Tobacco Use    Smoking status: Former     Current packs/day: 0.00     Average packs/day: 0.5 packs/day for 54.0 years (27.0 ttl pk-yrs)     Types: Cigarettes     Start date: 1969     Quit date: 2023     Years since quittin.2    Smokeless tobacco: Never    Tobacco comments:     QUIT IN  "02/2023   Vaping Use    Vaping status: Never Used   Substance Use Topics    Alcohol use: Not Currently    Drug use: Never         Review of Systems:  Review of Systems   Unable to perform ROS: Mental status change   Constitutional:  Positive for unexpected weight change.   Cardiovascular:  Negative for chest pain.   Gastrointestinal:  Negative for abdominal pain.   Neurological:  Negative for weakness, numbness and headaches.   Psychiatric/Behavioral:  Positive for confusion.         Physical Exam:  /99 (BP Location: Right arm, Patient Position: Lying)   Pulse 94   Temp 98.4 °F (36.9 °C) (Oral)   Resp 18   Ht 170.2 cm (67\")   Wt 74.3 kg (163 lb 12.8 oz)   SpO2 94%   BMI 25.66 kg/m²     Physical Exam  Vitals and nursing note reviewed.   Constitutional:       General: He is awake.      Appearance: Normal appearance. He is well-developed.   HENT:      Head: Normocephalic and atraumatic.      Nose: Nose normal.      Mouth/Throat:      Mouth: Mucous membranes are moist.   Eyes:      Extraocular Movements: Extraocular movements intact.      Pupils: Pupils are equal, round, and reactive to light.   Cardiovascular:      Rate and Rhythm: Normal rate and regular rhythm.      Heart sounds: Normal heart sounds.   Pulmonary:      Effort: Pulmonary effort is normal. No respiratory distress.      Breath sounds: Normal breath sounds. No wheezing, rhonchi or rales.   Abdominal:      General: Bowel sounds are normal.      Palpations: Abdomen is soft.      Tenderness: There is no abdominal tenderness. There is no guarding or rebound.      Comments: No rigidity   Musculoskeletal:         General: No tenderness. Normal range of motion.      Cervical back: Normal range of motion and neck supple.   Skin:     General: Skin is warm and dry.      Coloration: Skin is not jaundiced.   Neurological:      General: No focal deficit present.      Mental Status: He is alert. He is confused.      Cranial Nerves: No cranial nerve deficit " or facial asymmetry.      Sensory: No sensory deficit.      Motor: No weakness.   Psychiatric:         Mood and Affect: Mood normal.                    Medical Decision Making:      Comorbidities that affect care:    CAD, hypertension, diabetes, GERD, seizure disorder    External Notes reviewed:    Previous Clinic Note: Family medicine office visit earlier today.  [facility] family [facility].  Description: Change in mental status, uncontrolled diabetes.      The following orders were placed and all results were independently analyzed by me:  Orders Placed This Encounter   Procedures    CT Head Without Contrast    XR Chest 1 View    [place] Draw    Comprehensive Metabolic Panel    Urinalysis With Microscopic If Indicated (No Culture) - Urine, Clean Catch    Blood Gas, Venous -    Acetone    CBC Auto Differential    Blood Gas, Venous -    Osmolality, Serum    Ammonia    Ethanol    Arterial Blood Gas + Electrolytes    Blood Gas, Arterial -    Levetiracetam Level (Keppra)    NPO Diet NPO Type: Strict NPO    Undress & Gown    Continuous Pulse Oximetry    Vital Signs    Hospitalist (on-call MD unless specified)    Oxygen Therapy- Nasal Cannula; Titrate 1-6 LPM Per SpO2; 90 - 95%    POC Glucose Q1H    POC Lactate    POC Electrolyte Panel    POC Glucose Once    Insert Peripheral IV    CBC & Differential    Green Top (Gel)    Lavender Top    Gold Top - SST    Light Blue Top       Medications Given in the Emergency Department:  Medications   sodium chloride 0.9 % flush 10 mL (has no administration in time range)   sodium chloride 0.9 % infusion (has no administration in time range)   sodium chloride 0.9 % bolus 1,000 mL (0 mL Intravenous Stopped 5/14/25 1441)        ED Course:    ED Course as of 05/14/25 1600   Wed May 14, 2025   1245 CO2(!): 19.0 [RP]      ED Course User Index  [RP] Ananth Cruz MD       Labs:    Lab Results (last 24 hours)       Procedure Component Value Units Date/Time    CBC & Differential  [[id number]]  (Abnormal) Collected: 05/14/25 1200    Specimen: Blood Updated: 05/14/25 1207    Narrative:      The following orders were created for panel order CBC & Differential.  Procedure                               Abnormality         Status                     ---------                               -----------         ------                     CBC Auto Differential[[id number]]        Abnormal            Final result                 Please view results for these tests on the individual orders.    Comprehensive Metabolic Panel [[id number]]  (Abnormal) Collected: 05/14/25 1200    Specimen: Blood Updated: 05/14/25 1239     Glucose 461 mg/dL      BUN 26 mg/dL      Creatinine 1.27 mg/dL      Sodium 132 mmol/L      Potassium 4.4 mmol/L      Chloride 94 mmol/L      CO2 19.0 mmol/L      Calcium 9.4 mg/dL      Total Protein 7.6 g/dL      Albumin 4.6 g/dL      ALT (SGPT) 26 U/L      AST (SGOT) 19 U/L      Alkaline Phosphatase 78 U/L      Total Bilirubin 0.6 mg/dL      Globulin 3.0 gm/dL      A/G Ratio 1.5 g/dL      BUN/Creatinine Ratio 20.5     Anion Gap 19.0 mmol/L      eGFR 61.2 mL/min/1.73     Narrative:      GFR Categories in Chronic Kidney Disease (CKD)              GFR Category          GFR (mL/min/1.73)    Interpretation  G1                    90 or greater        Normal or high (1)  G2                    60-89                Mild decrease (1)  G3a                   45-59                Mild to moderate decrease  G3b                   30-44                Moderate to severe decrease  G4                    15-29                Severe decrease  G5                    14 or less           Kidney failure    (1)In the absence of evidence of kidney disease, neither GFR category G1 or G2 fulfill the criteria for CKD.    eGFR calculation 2021 CKD-EPI creatinine equation, which does not include race as a factor    Acetone [[id number]]  (Normal) Collected: 05/14/25 1200    Specimen: Blood Updated: 05/14/25 1259     Acetone  Negative    CBC Auto Differential [[id number]]  (Abnormal) Collected: 05/14/25 1200    Specimen: Blood Updated: 05/14/25 1207     WBC 11.83 10*3/mm3      RBC 4.89 10*6/mm3      Hemoglobin 15.1 g/dL      Hematocrit 43.2 %      MCV 88.3 fL      MCH 30.9 pg      MCHC 35.0 g/dL      RDW 12.8 %      RDW-SD 41.3 fl      MPV 11.4 fL      Platelets 216 10*3/mm3      Neutrophil % 83.5 %      Lymphocyte % 11.4 %      Monocyte % 4.5 %      Eosinophil % 0.0 %      Basophil % 0.3 %      Immature Grans % 0.3 %      Neutrophils, Absolute 9.87 10*3/mm3      Lymphocytes, Absolute 1.35 10*3/mm3      Monocytes, Absolute 0.53 10*3/mm3      Eosinophils, Absolute 0.00 10*3/mm3      Basophils, Absolute 0.04 10*3/mm3      Immature Grans, Absolute 0.04 10*3/mm3      nRBC 0.0 /100 WBC     Osmolality, Serum [[id number]]  (Abnormal) Collected: 05/14/25 1200    Specimen: Blood Updated: 05/14/25 1327     Osmolality 307 mOsm/kg     Ethanol [[id number]] Collected: 05/14/25 1200    Specimen: Blood Updated: 05/14/25 1325     Ethanol <10 mg/dL      Ethanol % <0.010 %     Narrative:      Ethanol (Plasma)  <10 Essentially Negative    Toxic Concentrations           mg/dL    Flushing, slowing of reflexes    Impaired visual activity         Depression of CNS              >100  Possible Coma                  >300       Urinalysis With Microscopic If Indicated (No Culture) - Urine, Clean Catch [[id number]]  (Abnormal) Collected: 05/14/25 1205    Specimen: Urine, Clean Catch Updated: 05/14/25 1226     Color, UA Yellow     Appearance, UA Clear     pH, UA <=5.0     Specific Gravity, UA 1.029     Glucose, UA >=1000 mg/dL (3+)     Ketones, UA 15 mg/dL (1+)     Bilirubin, UA Negative     Blood, UA Negative     Protein, UA Negative     Leuk Esterase, UA Negative     Nitrite, UA Negative     Urobilinogen, UA 0.2 E.U./dL    Narrative:      Urine microscopic not indicated.    Blood Gas, Venous - [[id number]]  (Abnormal) Collected: 05/14/25 1230    Specimen:  Venous Blood Updated: 05/14/25 1233     pH, Venous 7.421 pH Units      pCO2, Venous 32.3 mm Hg      pO2, Venous 47.0 mm Hg      HCO3, Venous 21.0 mmol/L      Base Excess, Venous -2.5 mmol/L      Comment: Serial Number: [id number]:  [id number]        O2 Saturation, Venous 84.0 %      Hemoglobin, Blood Gas 14.9 g/dL      Barometric Pressure for Blood Gas 738.0000 mmHg      Modality N/A    POC Glucose Q1H [[id number]]  (Abnormal) Collected: 05/14/25 1314    Specimen: Blood Updated: 05/14/25 1316     Glucose 392 mg/dL      Comment: Serial Number: [id number]:  [id number]       Ammonia [[id number]]  (Abnormal) Collected: 05/14/25 1320    Specimen: Blood from Arm, Left Updated: 05/14/25 1344     Ammonia 14 umol/L     POC Glucose Q1H [[id number]]  (Abnormal) Collected: 05/14/25 1407    Specimen: Blood Updated: 05/14/25 1410     Glucose 407 mg/dL      Comment: Serial Number: [id number]:  [id number]       Blood Gas, Arterial - [[id number]]  (Abnormal) Collected: 05/14/25 1407    Specimen: Arterial Blood Updated: 05/14/25 1410     Site Right Brachial     Shakeel's Test Positive     pH, Arterial 7.427 pH units      pCO2, Arterial 31.7 mm Hg      pO2, Arterial 76.0 mm Hg      HCO3, Arterial 20.9 mmol/L      Base Excess, Arterial -2.5 mmol/L      Comment: Serial Number: [id number]:  [id number]        O2 Saturation, Arterial 95.6 %      Hemoglobin, Blood Gas 14.1 g/dL      Hematocrit, Blood Gas 41.0 %      Barometric Pressure for Blood Gas 737.8000 mmHg      Modality RA     Hemodilution No    POC Lactate [[id number]]  (Normal) Collected: 05/14/25 1407    Specimen: Arterial Blood Updated: 05/14/25 1410     Lactate 1.2 mmol/L      Comment: Serial Number: [id number]:  [id number]       POC Electrolyte Panel [[id number]]  (Abnormal) Collected: 05/14/25 1407    Specimen: Arterial Blood Updated: 05/14/25 1410     Sodium 134 mmol/L      POC Potassium 3.9 mmol/L      Chloride 102 mmol/L      Ionized Calcium 1.09 mmol/L      Comment: Serial  Number: [id number]:  [id number]       POC Glucose Once [[id number]]  (Abnormal) Collected: 05/14/25 1522    Specimen: Blood Updated: 05/14/25 1524     Glucose 326 mg/dL      Comment: Serial Number: [id number]:  [id number]                Imaging:    CT Head Without Contrast  Result Date: 5/14/2025  CT HEAD WO CONTRAST Date of Exam: 5/14/2025 2:21 PM EDT Indication: AMS headache. Comparison: None available. Technique: Axial CT images were obtained of the head without contrast administration.  Reconstructed coronal and sagittal images were also obtained. Automated exposure control and iterative construction methods were used. Findings: There is no evidence of hemorrhage. There is no mass effect or midline shift. Diffuse brain atrophy. Periventricular hypodensities compatible with chronic vessel ischemia. Chronic right periventricular lacunar infarct There is no extracerebral collection. Ventricles are normal in size and configuration for patient's stated age. Posterior fossa is within normal limits. Calvarium and skull base appear intact.  Visualized sinuses show no air fluid levels. Visualized orbits are unremarkable.     Impression: 1.No acute intracranial abnormality identified. 2.Chronic microvascular ischemic changes Electronically Signed: Josiah Correia MD  5/14/2025 2:51 PM EDT  Workstation ID: [id number]    XR Chest 1 View  Result Date: 5/14/2025  XR CHEST 1 VW Date of Exam: 5/14/2025 1:31 PM EDT Indication: Hypoxia Comparison: 4/21/2024 Findings: Sternotomy wires overlie the midline. No focal pulmonary consolidations. No pneumothorax or pleural fluid identified. Pulmonary vascularity, heart size and mediastinal contour appear within normal limits. Aortic vascular calcification is noted.     Impression: No acute cardiopulmonary findings. Electronically Signed: Rudy Stewart MD  5/14/2025 1:42 PM EDT  Workstation ID: [id number]        Differential Diagnosis and Discussion:    Altered Mental Status: Based on the  patient's signs and symptoms, differential diagnosis includes but is not limited to meningitis, stroke, sepsis, subarachnoid hemorrhage, intracranial bleeding, encephalitis, and metabolic encephalopathy.    PROCEDURES:    Labs were collected in the emergency department and all labs were reviewed and interpreted by me.  X-ray were performed in the emergency department and all X-ray impressions were independently interpreted by me.  An EKG was performed and the EKG was interpreted by me.  CT scan was performed in the emergency department and the CT scan radiology impression was interpreted by me.    No orders to display       Procedures    MDM     Amount and/or Complexity of Data Reviewed  Clinical lab tests: reviewed                       Patient Care Considerations:    MRI: I considered ordering an MRI however he has an NIH score of 0 with no focal motor weakness, numbness tingling, dysarthria.      Consultants/Shared Management Plan:    Hospitalist: I have discussed the case with Dr. Villanueva who agrees to accept the patient for admission.    Social Determinants of Health:    Patient is unable to carry out activities of daily life. Escalation of care is necessary.       Disposition and Care Coordination:    Admit:   Through independent evaluation of the patient's history, physical, and imperical data, the patient meets criteria for inpatient admission to the hospital.        Final diagnoses:   Altered mental status, unspecified altered mental status type   Hyperglycemia   Hyperosmolar syndrome        ED Disposition       ED Disposition   Decision to Admit    Condition   --    Comment   --               This medical record created using voice recognition software.             Ananth Cruz MD  05/14/25 1600

## 2025-05-14 NOTE — NURSING NOTE
Patient had raised, dry scaly patch to right knee, and dry flaky skin to both feet, no other areas noted.

## 2025-05-14 NOTE — PLAN OF CARE
Goal Outcome Evaluation:  Plan of Care Reviewed With: patient, spouse        Progress: no change  Outcome Evaluation: Patient an admission from ED, no complaints of pain or discomfort, vitals stable.

## 2025-05-15 LAB
ALBUMIN SERPL-MCNC: 4.2 G/DL (ref 3.5–5.2)
ALBUMIN/GLOB SERPL: 1.8 G/DL
ALP SERPL-CCNC: 66 U/L (ref 39–117)
ALT SERPL W P-5'-P-CCNC: 23 U/L (ref 1–41)
ANION GAP SERPL CALCULATED.3IONS-SCNC: 12.7 MMOL/L (ref 5–15)
AST SERPL-CCNC: 16 U/L (ref 1–40)
BASOPHILS # BLD AUTO: 0.04 10*3/MM3 (ref 0–0.2)
BASOPHILS NFR BLD AUTO: 0.4 % (ref 0–1.5)
BILIRUB SERPL-MCNC: 0.6 MG/DL (ref 0–1.2)
BUN SERPL-MCNC: 20 MG/DL (ref 8–23)
BUN/CREAT SERPL: 22.7 (ref 7–25)
CALCIUM SPEC-SCNC: 8.7 MG/DL (ref 8.6–10.5)
CHLORIDE SERPL-SCNC: 100 MMOL/L (ref 98–107)
CO2 SERPL-SCNC: 22.3 MMOL/L (ref 22–29)
CREAT SERPL-MCNC: 0.88 MG/DL (ref 0.76–1.27)
CREAT UR-MCNC: 53.6 MG/DL
DEPRECATED RDW RBC AUTO: 42.2 FL (ref 37–54)
EGFRCR SERPLBLD CKD-EPI 2021: 93.1 ML/MIN/1.73
EOSINOPHIL # BLD AUTO: 0.03 10*3/MM3 (ref 0–0.4)
EOSINOPHIL NFR BLD AUTO: 0.3 % (ref 0.3–6.2)
ERYTHROCYTE [DISTWIDTH] IN BLOOD BY AUTOMATED COUNT: 13 % (ref 12.3–15.4)
FOLATE SERPL-MCNC: 15.8 NG/ML (ref 4.78–24.2)
GLOBULIN UR ELPH-MCNC: 2.3 GM/DL
GLUCOSE BLDC GLUCOMTR-MCNC: 186 MG/DL (ref 70–99)
GLUCOSE BLDC GLUCOMTR-MCNC: 195 MG/DL (ref 70–99)
GLUCOSE BLDC GLUCOMTR-MCNC: 205 MG/DL (ref 70–99)
GLUCOSE BLDC GLUCOMTR-MCNC: 299 MG/DL (ref 70–99)
GLUCOSE BLDC GLUCOMTR-MCNC: 308 MG/DL (ref 70–99)
GLUCOSE SERPL-MCNC: 240 MG/DL (ref 65–99)
HCT VFR BLD AUTO: 43.3 % (ref 37.5–51)
HGB BLD-MCNC: 14.7 G/DL (ref 13–17.7)
IMM GRANULOCYTES # BLD AUTO: 0.04 10*3/MM3 (ref 0–0.05)
IMM GRANULOCYTES NFR BLD AUTO: 0.4 % (ref 0–0.5)
LYMPHOCYTES # BLD AUTO: 2.07 10*3/MM3 (ref 0.7–3.1)
LYMPHOCYTES NFR BLD AUTO: 22.6 % (ref 19.6–45.3)
MCH RBC QN AUTO: 30.2 PG (ref 26.6–33)
MCHC RBC AUTO-ENTMCNC: 33.9 G/DL (ref 31.5–35.7)
MCV RBC AUTO: 89.1 FL (ref 79–97)
MONOCYTES # BLD AUTO: 0.77 10*3/MM3 (ref 0.1–0.9)
MONOCYTES NFR BLD AUTO: 8.4 % (ref 5–12)
NEUTROPHILS NFR BLD AUTO: 6.21 10*3/MM3 (ref 1.7–7)
NEUTROPHILS NFR BLD AUTO: 67.9 % (ref 42.7–76)
NRBC BLD AUTO-RTO: 0 /100 WBC (ref 0–0.2)
PLATELET # BLD AUTO: 211 10*3/MM3 (ref 140–450)
PMV BLD AUTO: 11.1 FL (ref 6–12)
POTASSIUM SERPL-SCNC: 4 MMOL/L (ref 3.5–5.2)
PROT ?TM UR-MCNC: 7 MG/DL
PROT SERPL-MCNC: 6.5 G/DL (ref 6–8.5)
PROT/CREAT UR: 0.13 MG/G{CREAT}
RBC # BLD AUTO: 4.86 10*6/MM3 (ref 4.14–5.8)
SODIUM SERPL-SCNC: 135 MMOL/L (ref 136–145)
VIT B12 BLD-MCNC: 370 PG/ML (ref 211–946)
WBC NRBC COR # BLD AUTO: 9.16 10*3/MM3 (ref 3.4–10.8)

## 2025-05-15 PROCEDURE — 63710000001 INSULIN LISPRO (HUMAN) PER 5 UNITS: Performed by: STUDENT IN AN ORGANIZED HEALTH CARE EDUCATION/TRAINING PROGRAM

## 2025-05-15 PROCEDURE — 99221 1ST HOSP IP/OBS SF/LOW 40: CPT | Performed by: PSYCHIATRY & NEUROLOGY

## 2025-05-15 PROCEDURE — 97165 OT EVAL LOW COMPLEX 30 MIN: CPT

## 2025-05-15 PROCEDURE — 99233 SBSQ HOSP IP/OBS HIGH 50: CPT | Performed by: INTERNAL MEDICINE

## 2025-05-15 PROCEDURE — 85025 COMPLETE CBC W/AUTO DIFF WBC: CPT | Performed by: STUDENT IN AN ORGANIZED HEALTH CARE EDUCATION/TRAINING PROGRAM

## 2025-05-15 PROCEDURE — 82948 REAGENT STRIP/BLOOD GLUCOSE: CPT

## 2025-05-15 PROCEDURE — 63710000001 INSULIN GLARGINE PER 5 UNITS: Performed by: STUDENT IN AN ORGANIZED HEALTH CARE EDUCATION/TRAINING PROGRAM

## 2025-05-15 PROCEDURE — 82948 REAGENT STRIP/BLOOD GLUCOSE: CPT | Performed by: STUDENT IN AN ORGANIZED HEALTH CARE EDUCATION/TRAINING PROGRAM

## 2025-05-15 PROCEDURE — 80053 COMPREHEN METABOLIC PANEL: CPT | Performed by: STUDENT IN AN ORGANIZED HEALTH CARE EDUCATION/TRAINING PROGRAM

## 2025-05-15 PROCEDURE — 82746 ASSAY OF FOLIC ACID SERUM: CPT | Performed by: STUDENT IN AN ORGANIZED HEALTH CARE EDUCATION/TRAINING PROGRAM

## 2025-05-15 PROCEDURE — 63710000001 INSULIN GLARGINE PER 5 UNITS: Performed by: INTERNAL MEDICINE

## 2025-05-15 PROCEDURE — 82607 VITAMIN B-12: CPT | Performed by: STUDENT IN AN ORGANIZED HEALTH CARE EDUCATION/TRAINING PROGRAM

## 2025-05-15 RX ORDER — ALBUTEROL SULFATE 0.83 MG/ML
2.5 SOLUTION RESPIRATORY (INHALATION) EVERY 4 HOURS PRN
Status: DISCONTINUED | OUTPATIENT
Start: 2025-05-15 | End: 2025-05-16 | Stop reason: HOSPADM

## 2025-05-15 RX ORDER — HYDROCHLOROTHIAZIDE 25 MG/1
25 TABLET ORAL
Status: DISCONTINUED | OUTPATIENT
Start: 2025-05-15 | End: 2025-05-16 | Stop reason: HOSPADM

## 2025-05-15 RX ORDER — ATORVASTATIN CALCIUM 40 MG/1
80 TABLET, FILM COATED ORAL NIGHTLY
Status: DISCONTINUED | OUTPATIENT
Start: 2025-05-15 | End: 2025-05-16 | Stop reason: HOSPADM

## 2025-05-15 RX ORDER — LISINOPRIL 20 MG/1
20 TABLET ORAL
Status: DISCONTINUED | OUTPATIENT
Start: 2025-05-15 | End: 2025-05-16 | Stop reason: HOSPADM

## 2025-05-15 RX ORDER — CETIRIZINE HYDROCHLORIDE 10 MG/1
10 TABLET ORAL DAILY
Status: DISCONTINUED | OUTPATIENT
Start: 2025-05-15 | End: 2025-05-16 | Stop reason: HOSPADM

## 2025-05-15 RX ORDER — PANTOPRAZOLE SODIUM 40 MG/1
40 TABLET, DELAYED RELEASE ORAL
Status: DISCONTINUED | OUTPATIENT
Start: 2025-05-15 | End: 2025-05-16 | Stop reason: HOSPADM

## 2025-05-15 RX ORDER — LEVETIRACETAM 750 MG/1
750 TABLET ORAL EVERY 12 HOURS SCHEDULED
Status: DISCONTINUED | OUTPATIENT
Start: 2025-05-15 | End: 2025-05-16 | Stop reason: HOSPADM

## 2025-05-15 RX ORDER — ASPIRIN 81 MG/1
81 TABLET ORAL DAILY
Status: DISCONTINUED | OUTPATIENT
Start: 2025-05-15 | End: 2025-05-16 | Stop reason: HOSPADM

## 2025-05-15 RX ORDER — DILTIAZEM HYDROCHLORIDE 120 MG/1
240 CAPSULE, COATED, EXTENDED RELEASE ORAL
Status: DISCONTINUED | OUTPATIENT
Start: 2025-05-15 | End: 2025-05-16

## 2025-05-15 RX ORDER — MONTELUKAST SODIUM 10 MG/1
10 TABLET ORAL DAILY
Status: DISCONTINUED | OUTPATIENT
Start: 2025-05-15 | End: 2025-05-16 | Stop reason: HOSPADM

## 2025-05-15 RX ORDER — DOXAZOSIN 4 MG/1
4 TABLET ORAL NIGHTLY
Status: DISCONTINUED | OUTPATIENT
Start: 2025-05-15 | End: 2025-05-16 | Stop reason: HOSPADM

## 2025-05-15 RX ORDER — METOPROLOL SUCCINATE 50 MG/1
100 TABLET, EXTENDED RELEASE ORAL DAILY
Status: DISCONTINUED | OUTPATIENT
Start: 2025-05-15 | End: 2025-05-16

## 2025-05-15 RX ADMIN — DOXAZOSIN 4 MG: 4 TABLET ORAL at 20:19

## 2025-05-15 RX ADMIN — LEVETIRACETAM 750 MG: 750 TABLET, FILM COATED ORAL at 20:19

## 2025-05-15 RX ADMIN — INSULIN LISPRO 2 UNITS: 100 INJECTION, SOLUTION INTRAVENOUS; SUBCUTANEOUS at 12:49

## 2025-05-15 RX ADMIN — ATORVASTATIN CALCIUM 80 MG: 40 TABLET, FILM COATED ORAL at 00:47

## 2025-05-15 RX ADMIN — INSULIN LISPRO 4 UNITS: 100 INJECTION, SOLUTION INTRAVENOUS; SUBCUTANEOUS at 08:39

## 2025-05-15 RX ADMIN — PANTOPRAZOLE SODIUM 40 MG: 40 TABLET, DELAYED RELEASE ORAL at 05:44

## 2025-05-15 RX ADMIN — INSULIN GLARGINE 15 UNITS: 100 INJECTION, SOLUTION SUBCUTANEOUS at 20:18

## 2025-05-15 RX ADMIN — ASPIRIN 81 MG: 81 TABLET, COATED ORAL at 08:28

## 2025-05-15 RX ADMIN — Medication 10 ML: at 08:28

## 2025-05-15 RX ADMIN — INSULIN LISPRO 2 UNITS: 100 INJECTION, SOLUTION INTRAVENOUS; SUBCUTANEOUS at 18:06

## 2025-05-15 RX ADMIN — APIXABAN 5 MG: 5 TABLET, FILM COATED ORAL at 00:47

## 2025-05-15 RX ADMIN — CETIRIZINE HYDROCHLORIDE 10 MG: 10 TABLET, FILM COATED ORAL at 08:28

## 2025-05-15 RX ADMIN — APIXABAN 5 MG: 5 TABLET, FILM COATED ORAL at 20:19

## 2025-05-15 RX ADMIN — LISINOPRIL 20 MG: 20 TABLET ORAL at 08:28

## 2025-05-15 RX ADMIN — INSULIN GLARGINE 10 UNITS: 100 INJECTION, SOLUTION SUBCUTANEOUS at 00:46

## 2025-05-15 RX ADMIN — INSULIN LISPRO 5 UNITS: 100 INJECTION, SOLUTION INTRAVENOUS; SUBCUTANEOUS at 20:18

## 2025-05-15 RX ADMIN — ATORVASTATIN CALCIUM 80 MG: 40 TABLET, FILM COATED ORAL at 20:19

## 2025-05-15 RX ADMIN — MONTELUKAST 10 MG: 10 TABLET, FILM COATED ORAL at 08:28

## 2025-05-15 RX ADMIN — METOPROLOL SUCCINATE 100 MG: 50 TABLET, EXTENDED RELEASE ORAL at 08:28

## 2025-05-15 RX ADMIN — DILTIAZEM HYDROCHLORIDE 240 MG: 120 CAPSULE, COATED, EXTENDED RELEASE ORAL at 08:28

## 2025-05-15 RX ADMIN — Medication 10 ML: at 20:19

## 2025-05-15 RX ADMIN — LEVETIRACETAM 750 MG: 750 TABLET, FILM COATED ORAL at 08:39

## 2025-05-15 RX ADMIN — DOXAZOSIN 4 MG: 4 TABLET ORAL at 00:47

## 2025-05-15 RX ADMIN — APIXABAN 5 MG: 5 TABLET, FILM COATED ORAL at 08:39

## 2025-05-15 RX ADMIN — HYDROCHLOROTHIAZIDE 25 MG: 25 TABLET ORAL at 08:28

## 2025-05-15 RX ADMIN — LEVETIRACETAM 750 MG: 750 TABLET, FILM COATED ORAL at 00:47

## 2025-05-15 NOTE — PLAN OF CARE
Goal Outcome Evaluation:  Plan of Care Reviewed With: patient        Progress: improving  Outcome Evaluation: Patient is Independent with adl and adl transfers/mobility. Patient has not demonstrated a decline and therefore is not appropriate for occupational therapy services at this time. Discharge OT services.    Anticipated Discharge Disposition (OT): home

## 2025-05-15 NOTE — CONSULTS
Consult placed per Glucose > 200. Met with patient and wife at bedside. Discussed current A1c of 15.5 % with an estimated average glucose of 398 mg/dl.     Educated on taking long acting insulin once daily in the morning at the same time each day. Provided education on how to administer insulin via insulin pen. Patient was able to demonstrate back using teach back/show back. Educated on rapid acting insulin/Humalog; checking blood sugar prior to each meal, administering insulin dose according to blood sugar, insulin storage, and not dosing rapid acting insulin more frequently than every 4 hours.    Patient is interested in a continuous glucose monitor for easier blood sugar management; however, he is unable to download the ap to his phone. Patient is willing to manually check blood sugar until he is bale to get a sensor.     Discussed patient's food choices at home. Patient states he doesn't stick to a specific diet but does not frequently drink regular soda/sweet tea. Discussed American Diabetes Association guidelines for how to build a plate, focusing on protein and non-starchy vegetables.    Educated hypoglycemia and treating blood sugars less than 70 mg/dl. Explained that patient is to drink a single serving of a sugar beverage (15g of CHO) such as orange juice or regular soda, wait 15 minutes, and recheck his blood sugar 15 minutes later. If he is still < 70 mg/dl he is to repeat the steps.    Educated on long term complications of prolonged hyperglycemia including organ failure, vision loss, limb loss, and death. Provided patient with Insulin Injection Instructions Using Insulin Pens, Essential Pillars of Diabetes Care, ADA's What Can I Eat, ADA's Diabetes Plate Method, and a larger copy of the sliding scale.     Patient is interested in seeing a diabetes specialist for his diabetes management. Scheduled an appointment [facility] [place] with OSCAR Francisco for Thursday 6/6/25 at 1020.  Patient and wife have no further needs or questions at this time. Will continue to assist and support as needed.     Recommend on discharge:  Rx for Lantus Solostar insulin pen (coupon provided)  Rx for Humalog Kwikpen insulin pen (coupon provided)  Rx for insulin pen needles (pended)  Rx for alcohol swabs (pended)  Rx for glucometer, lancets, and test strips (pended)

## 2025-05-15 NOTE — PLAN OF CARE
Goal Outcome Evaluation:  Plan of Care Reviewed With: patient, spouse        Progress: no change  Outcome Evaluation: A & O x 4, denies having discomfort or pain. Ad jennifer to bathroom, no issues while ambulating and tolerating well. Appears to be sleeping in between care without any needs at this time.

## 2025-05-15 NOTE — PROGRESS NOTES
[facility]   Hospitalist Progress Note  Date: 5/15/2025  Patient Name: Yehuda Weaver  : 1955  MRN: [id number]  Date of admission: 2025      Subjective   Subjective     Chief Complaint: Follow up for altered mental status    Summary: 69-year-old male with CAD, hypertension, A-fib on Eliquis, uncontrolled type 2 diabetes, COPD, peripheral artery disease, seizure disorder on Keppra who was brought in for confusion, erratic behavior, visual hallucinations, memory loss.  Initial blood pressure soft otherwise vital stable.  CT head negative.  No focal neurodeficits.  Serum glucose 461, anion gap 19, hemoglobin A1c 15.5.    Interval Followup:   Afebrile, blood pressure controlled  Alert and orientated this morning.  Significant other at bedside.  No signs of confusion.  Denies headache, focal weakness.  Reports polyuria and polydipsia, blurry vision, neuropathy in the lower extremities.    Objective   Objective     Vitals:   Temp:  [98.4 °F (36.9 °C)-99 °F (37.2 °C)] 98.8 °F (37.1 °C)  Heart Rate:  [] 85  Resp:  [16-20] 18  BP: (103-172)/(63-99) 103/63  Physical Exam    Constitutional: conversant, NAD   Respiratory:  nonlabored respirations    Cardiovascular:  RRR, no edema   Gastrointestinal: soft, nondistended   Neurologic: Alert, speech clear   Skin: Extremities warm    Result Review    Result Review:  I have personally reviewed the following over the last 24 hours (07:00 to 07:00) and agree with the following findings  [x]  Laboratory  CBC          2024    08:58 2025    12:00 5/15/2025    05:07   CBC   WBC 6.84  11.83  9.16    RBC 5.00  4.89  4.86    Hemoglobin 14.8  15.1  14.7    Hematocrit 45.1  43.2  43.3    MCV 90.2  88.3  89.1    MCH 29.6  30.9  30.2    MCHC 32.8  35.0  33.9    RDW 13.3  12.8  13.0    Platelets 203  216  211      CMP          2024    08:58 2025    12:00 5/15/2025    05:07   CMP   Glucose 146  461  240    BUN 14  26  20    Creatinine 0.94  1.27  0.88     EGFR 87.8  61.2  93.1    Sodium 140  132  135    Potassium 5.2  4.4  4.0    Chloride 99  94  100    Calcium 9.8  9.4  8.7    Total Protein  7.6  6.5    Albumin  4.6  4.2    Globulin  3.0  2.3    Total Bilirubin  0.6  0.6    Alkaline Phosphatase  78  66    AST (SGOT)  19  16    ALT (SGPT)  26  23    Albumin/Globulin Ratio  1.5  1.8    BUN/Creatinine Ratio 14.9  20.5  22.7    Anion Gap 13.3  19.0  12.7      Folate and vitamin B12 WNL    [x]  Microbiology  [x]  Radiology   CT Head Without Contrast  Result Date: 5/14/2025  CT HEAD WO CONTRAST Date of Exam: 5/14/2025 2:21 PM EDT Indication: AMS headache. Comparison: None available. Technique: Axial CT images were obtained of the head without contrast administration.  Reconstructed coronal and sagittal images were also obtained. Automated exposure control and iterative construction methods were used. Findings: There is no evidence of hemorrhage. There is no mass effect or midline shift. Diffuse brain atrophy. Periventricular hypodensities compatible with chronic vessel ischemia. Chronic right periventricular lacunar infarct There is no extracerebral collection. Ventricles are normal in size and configuration for patient's stated age. Posterior fossa is within normal limits. Calvarium and skull base appear intact.  Visualized sinuses show no air fluid levels. Visualized orbits are unremarkable.     Impression: 1.No acute intracranial abnormality identified. 2.Chronic microvascular ischemic changes Electronically Signed: Josiah Correia MD  5/14/2025 2:51 PM EDT  Workstation ID: [id number]    XR Chest 1 View  Result Date: 5/14/2025  XR CHEST 1 VW Date of Exam: 5/14/2025 1:31 PM EDT Indication: Hypoxia Comparison: 4/21/2024 Findings: Sternotomy wires overlie the midline. No focal pulmonary consolidations. No pneumothorax or pleural fluid identified. Pulmonary vascularity, heart size and mediastinal contour appear within normal limits. Aortic vascular calcification is noted.      Impression: No acute cardiopulmonary findings. Electronically Signed: Rudy Stewart MD  5/14/2025 1:42 PM EDT  Workstation ID: [id number]      [x]  EKG/Telemetry monitor personally reviewed and independently interpreted: NSR  []  Cardiology/Vascular   []  Pathology  []  Old records  [x]  Other:    Intake/Output Summary (Last 24 hours) at 5/15/2025 0840  Last data filed at 5/14/2025 1905  Gross per 24 hour   Intake 240 ml   Output 200 ml   Net 40 ml         Assessment & Plan   Assessment / Plan     Assessment:  Acute metabolic encephalopathy/ resolved  Uncontrolled type 2 diabetes with hyperglycemia  Anion gap metabolic acidosis/ resolved  Essential hypertension  Atrial fibrillation on Eliquis  Peripheral artery disease  Seizure disorder on Keppra  History of CAD  Bladder cancer       Plan:    Afebrile, white count normal, initial workup not suggestive of infection.  Blood cultures pending.   ABG unremarkable. TSH WNL.  Alcohol level negative.  Ammonia not elevated  History not suggestive of seizure. Appreciate neurology evaluation.  No further workup planned.  Continue Keppra 750 mg 2 times daily    Hemoglobin A1c above 15 (no previous for comparison). Current regimen only metformin  mg daily . Blood sugars down to 200s.  Anion gap normalized. UA 3+ glucose, 1+ ketones.  Check UPCR.  Discussed need for starting insulin and patient agreeable.  Will provide education.  Diabetic educator consult  Increase Lantus to 15 units q. Nightly.  Continue low-dose SSI AC and at bedtime  Consider SGLT2 prior to dc   Nutrition consult for diet education    Hold Cardizem CD, lisinopril, HCTZ given soft blood pressure  Heart rate controlled.  Continue Toprol- mg  Continue Eliquis 5 mg 2 times daily for stroke prevention  Continue aspirin and high intensity statin  CBC, CMP in a.m.    Discussed plan with RN.    VTE Prophylaxis:  Pharmacologic VTE prophylaxis orders are present.    I spent greater than 50 minutes minutes  of time for evaluation and management of this patient including review of chart, labs pertinent imaging available as well as medical decision making and discussion with physicians, staff and patient.      CODE STATUS:   Code Status (Patient has no pulse and is not breathing): CPR (Attempt to Resuscitate)  Medical Interventions (Patient has pulse or is breathing): Full Support    Electronically signed by Panda Mendosa DO, 05/15/25, 12:46 PM EDT.

## 2025-05-15 NOTE — THERAPY EVALUATION
Patient Name: Yehuda Weaver  : 1955    MRN: [id number]                              Today's Date: 5/15/2025       Admit Date: 2025    Visit Dx:     ICD-10-CM ICD-9-CM   1. Hyperosmolar syndrome  E87.0 276.0   2. Altered mental status, unspecified altered mental status type  R41.82 780.97   3. Hyperglycemia  R73.9 790.29   4. Impaired mobility and ADLs  Z74.09 V49.89    Z78.9      Patient Active Problem List   Diagnosis    Chronic obstructive pulmonary disease    Coronary artery disease    Diverticulitis    Hyperlipidemia    Hypertension    Arterial stent thrombosis, initial encounter    Atherosclerosis of native arteries of extremities with intermittent claudication, bilateral legs    Tobacco dependence due to cigarettes    PVD (peripheral vascular disease)    Seizure    Atherosclerosis of bypass graft of left lower extremity with intermittent claudication    Acute metabolic encephalopathy     Past Medical History:   Diagnosis Date    Arterial stent thrombosis     Arthritis     Asthma     Atherosclerosis of native arteries of extremities with intermittent claudication, bilateral legs 2020    Bladder cancer 2004    CAD (coronary artery disease) 2020    COPD (chronic obstructive pulmonary disease) 2020    Coronary artery disease     Diabetes mellitus     Diverticulitis     DVT OF PROXIMAL LOWER LIMB 2022    RIGHT LOWER LEG/CHRONIC    GERD (gastroesophageal reflux disease)     Heart attack 2007    History of COVID-19 2022    Hx of colonic polyps     Hyperlipidemia 2020    Hypertension 2020    Long term (current) use of anticoagulants 2022    Onychomycosis     PAD (peripheral artery disease)     PVD (peripheral vascular disease)     Seizures     2023, 2024/ STARTED KEPPRA AFTER LAST SEIZURE    Sleep apnea     RECENT DIAGNOSIS NO MACHINE, STILL NEEDS TO F/U W/PCP    Slow to wake up after anesthesia      Past Surgical History:   Procedure Laterality Date     ANGIOPLASTY Right 06/17/2022    fem/pop    APPENDECTOMY  1970    ARTERIOGRAM LOWER EXTREMITY Left 07/10/2023    Procedure: LEFT LOWER EXTERMITY ANGIOGRAM WITH TIBAL ARTERY ANGIOPLASTY;  Surgeon: Donna Bean Jr., MD;  Location: [facility] 18/19;  Service: Vascular;  Laterality: Left;    ARTERIOGRAM LOWER EXTREMITY WITH ANGIOPLASTY STENT Left 6/10/2024    Procedure: LEFT LOWER EXTREMITY ARTERIOGRAM WITH  ANGIOPLASTY;  Surgeon: Donna Bean Jr., MD;  Location: [facility] OR;  Service: Vascular;  Laterality: Left;    ATHRECTOMY ILIAC, FEMORAL, TIBIAL ARTERY Left 10/20/2022    Procedure: LEFT SUPERFICIAL FEMORAL ARTERY LASER ATHERECTOMY AND COVERED STENT, ANGIOPLASTY OF POPLITEAL AND TIBIAL PERINEAL TRUNK AND PHARMACOLOGIC THROMBOLYSIS;  Surgeon: Donna Bean Jr., MD;  Location: [facility] 18/19;  Service: Vascular;  Laterality: Left;    BACK SURGERY      BLADDER SURGERY  2004    cancer    COLONOSCOPY  07/22/2020    Dr. Castano    CORONARY ARTERY BYPASS GRAFT  2007    Triple coronary bypass    EKOS CATHETER PLACEMENT Right 06/16/2022    Procedure: Right lower extremity arteriogram via left groin approach with placement of thrombolysis infusion catheter;  Surgeon: Donna Bean Jr., MD;  Location: [facility] 18/19;  Service: Vascular;  Laterality: Right;    EKOS CATHETER REMOVAL N/A 06/17/2022    Procedure: EKOS CATHETER REMOVAL, RIGHT LOWER EXTREMITY ARTERIOGRAM WITH PERCUTANEOUS TRANSLUMINAL ANGIOPLASTY, ASPIRATION PNEUMBRA THROMBECTOMY, AND IVUS;  Surgeon: Yehuda Salgado MD;  Location: [facility] 18/19;  Service: Vascular;  Laterality: N/A;    FEMORAL ARTERY STENT Right 06/17/2022    FEMORAL POPLITEAL BYPASS Left 02/02/2023    Procedure: LEFT FEMORAL BELOW KNEE BYPASS INSITU GREATER SAPHENOUS VEIN;  Surgeon: Donna Bean Jr., MD;  Location: [facility];  Service: Vascular;  Laterality: Left;    HERNIA REPAIR      LEG  THROMBECTOMY/EMBOLECTOMY Right 06/17/2022    tibial artery    NECK SURGERY      PERIPHERAL ARTERIAL STENT GRAFT  06/25/2021    UPPER GASTROINTESTINAL ENDOSCOPY  07/22/2020    Dr. Castano    VASCULAR SURGERY Right 2022      General Information       Row Name 05/15/25 1029          OT Time and Intention    Document Type evaluation  -AC     Mode of Treatment individual therapy;occupational therapy  -AC     Patient Effort good  -AC       Row Name 05/15/25 1029          General Information    Patient Profile Reviewed yes  -AC     Prior Level of Function independent:  -AC     Existing Precautions/Restrictions no known precautions/restrictions  -AC     Barriers to Rehab none identified  -AC       Row Name 05/15/25 1029          Occupational Profile    Reason for Services/Referral (Occupational Profile) patient is a 69 year old male admitted for the above diagnosis. Occupational therapy ordered to assess patient safety and independence with adls. Patient not currently receiving OT services for the above diagnosis.  -AC       Row Name 05/15/25 1029          Living Environment    Current Living Arrangements home  -AC     People in Home significant other  -AC       Row Name 05/15/25 1029          Cognition    Orientation Status (Cognition) oriented x 3  -AC       Row Name 05/15/25 1029          Safety Issues/Impairments Affecting Functional Mobility    Comment, Safety Issues/Impairments (Mobility) none identified  -               User Key  (r) = Recorded By, (t) = Taken By, (c) = Cosigned By      Initials Name Provider Type    AC Carolee Ayala OT Occupational Therapist                     Mobility/ADL's       Row Name 05/15/25 1031          Bed Mobility    Bed Mobility bed mobility (all) activities  -AC     All Activities, [place] (Bed Mobility) independent  -AC       Row Name 05/15/25 1031          Transfers    Transfers sit-stand transfer  -AC       Row Name 05/15/25 1031          Sit-Stand Transfer    Sit-Stand  [place] (Transfers) independent  -AC       Row Name 05/15/25 1031          Functional Mobility    Functional Mobility- Ind. Level independent  -     Functional Mobility- Comment patient was (I) wtih functional mobility in the room without an AD and without loss of balance.  -AC       Row Name 05/15/25 1031          Activities of Daily Living    BADL Assessment/Intervention --  Patient is (I) with bathing/dressing, (I) with grooming, (I) with toileting, (I) with self-feeding, (I) with toileting.  -               User Key  (r) = Recorded By, (t) = Taken By, (c) = Cosigned By      Initials Name Provider Type    Carolee Muñiz OT Occupational Therapist                   Obj/Interventions       Row Name 05/15/25 1032          Sensory Assessment (Somatosensory)    Sensory Assessment (Somatosensory) UE sensation intact  -AC       Row Name 05/15/25 1032          Vision Assessment/Intervention    Visual Impairment/Limitations WFL  -[facility] 05/15/25 1032          Range of Motion Comprehensive    General Range of Motion bilateral upper extremity ROM WNL  -AC       Row Name 05/15/25 1032          Strength Comprehensive (MMT)    General Manual Muscle Testing (MMT) Assessment no strength deficits identified  -AC       Row Name 05/15/25 1032          Motor Skills    Motor Skills coordination;functional endurance  -     Coordination WNL  -     Functional Endurance fair plus/good for adls.  -AC       Row Name 05/15/25 1032          Balance    Balance Assessment standing dynamic balance  -AC     Dynamic Standing Balance independent  -     Position/Device Used, Standing Balance unsupported  -               User Key  (r) = Recorded By, (t) = Taken By, (c) = Cosigned By      Initials Name Provider Type    Carolee Muñiz OT Occupational Therapist                   Goals/Plan    No documentation.                  Clinical Impression       Row Name 05/15/25 1032          Pain Assessment    Pretreatment Pain  Rating 0/10 - no pain  -AC     Posttreatment Pain Rating 0/10 - no pain  -AC       Row Name 05/15/25 1032          Plan of Care Review    Plan of Care Reviewed With patient  -AC     Progress improving  -AC     Outcome Evaluation Patient is Independent with adl and adl transfers/mobility. Patient has not demonstrated a decline and therefore is not appropriate for occupational therapy services at this time. Discharge OT services.  -       Row Name 05/15/25 1032          Therapy Assessment/Plan (OT)    Patient/Family Therapy Goal Statement (OT) NA  -AC     Rehab Potential (OT) --  NA  -AC     Criteria for Skilled Therapeutic Interventions Met (OT) no;does not meet criteria for skilled intervention  -AC     Therapy Frequency (OT) evaluation only  -AC       Row Name 05/15/25 1032          Therapy Plan Review/Discharge Plan (OT)    Anticipated Discharge Disposition (OT) home  -       Row Name 05/15/25 1032          Positioning and Restraints    Pre-Treatment Position in bed  -AC     Post Treatment Position bed  -AC     In Bed fowlers;call light within reach;encouraged to call for assist  -AC               User Key  (r) = Recorded By, (t) = Taken By, (c) = Cosigned By      Initials Name Provider Type    AC Carolee Ayala, OT Occupational Therapist                   Outcome Measures       Row Name 05/15/25 1034          How much help from another is currently needed...    Putting on and taking off regular lower body clothing? 4  -AC     Bathing (including washing, rinsing, and drying) 4  -AC     Toileting (which includes using toilet bed pan or urinal) 4  -AC     Putting on and taking off regular upper body clothing 4  -AC     Taking care of personal grooming (such as brushing teeth) 4  -AC     Eating meals 4  -AC     AM-PAC 6 Clicks Score (OT) 24  -AC       Row Name 05/15/25 0106          How much help from another person do you currently need...    Turning from your back to your side while in flat bed without using  bedrails? 4  -JS     Moving from lying on back to sitting on the side of a flat bed without bedrails? 4  -JS     Moving to and from a bed to a chair (including a wheelchair)? 4  -JS     Standing up from a chair using your arms (e.g., wheelchair, bedside chair)? 4  -JS     Climbing 3-5 steps with a railing? 3  -JS     To walk in hospital room? 4  -JS     AM-PAC 6 Clicks Score (PT) 23  -JS       Row Name 05/15/25 1034          Functional Assessment    Outcome Measure Options AM-PAC 6 Clicks Daily Activity (OT);Optimal Instrument  -AC       Row Name 05/15/25 1034          Optimal Instrument    Optimal Instrument Optimal - 3  -AC     Bending/Stooping 1  -AC     Standing 1  -AC     Reaching 1  -AC     From the list, choose the 3 activities you would most like to be able to do without any difficulty Bending/stooping;Standing;Reaching  -AC     Total Score Optimal - 3 3  -AC               User Key  (r) = Recorded By, (t) = Taken By, (c) = Cosigned By      Initials Name Provider Type     Carolee Ayala OT Occupational Therapist    Anel Ash, RN Registered Nurse                    Occupational Therapy Education       Title: PT OT SLP Therapies (Done)       Topic: Occupational Therapy (Done)       Point: ADL training (Done)       Learning Progress Summary            Patient Acceptance, E, VU by  at 5/15/2025 1035                      Point: Home exercise program (Done)       Learning Progress Summary            Patient Acceptance, E, VU by  at 5/15/2025 1035                      Point: Precautions (Done)       Learning Progress Summary            Patient Acceptance, E, VU by  at 5/15/2025 1035                      Point: Body mechanics (Done)       Learning Progress Summary            Patient Acceptance, E, VU by  at 5/15/2025 1035                                      User Key       Initials Effective Dates Name Provider Type Discipline     06/16/21 -  Carolee Ayala OT Occupational Therapist OT                   OT Recommendation and Plan  Therapy Frequency (OT): evaluation only  Plan of Care Review  Plan of Care Reviewed With: patient  Progress: improving  Outcome Evaluation: Patient is Independent with adl and adl transfers/mobility. Patient has not demonstrated a decline and therefore is not appropriate for occupational therapy services at this time. Discharge OT services.     Time Calculation:   Evaluation Complexity (OT)  Review Occupational Profile/Medical/Therapy History Complexity: brief/low complexity  Assessment, Occupational Performance/Identification of Deficit Complexity: 1-3 performance deficits  Clinical Decision Making Complexity (OT): problem focused assessment/low complexity  Overall Complexity of Evaluation (OT): low complexity     Time Calculation- OT       Row Name 05/15/25 1035             Time Calculation- OT    OT Received On 05/15/25  -AC         Untimed Charges    OT Eval/Re-eval Minutes 26  -AC         Total Minutes    Untimed Charges Total Minutes 26  -AC       Total Minutes 26  -AC                User Key  (r) = Recorded By, (t) = Taken By, (c) = Cosigned By      Initials Name Provider Type    AC Carolee Ayala OT Occupational Therapist                  Therapy Charges for Today       Code Description Service Date Service Provider Modifiers Qty    [id number]  OT EVAL LOW COMPLEXITY 2 5/15/2025 Carolee Ayala OT GO 1                 Carolee Ayala OT  5/15/2025

## 2025-05-15 NOTE — PLAN OF CARE
Goal Outcome Evaluation:              Outcome Evaluation: patient up ad jennifer, wife remains at bedside, blood glucose checks AC and sliding scale given per orders. States vision has improved and alert and oriented x 4 with periods of forgetfulness

## 2025-05-15 NOTE — CONSULTS
"Primary Care Provider: No ref. provider found     Consult requested by: Admitting team    Reason for Consultation: Neurological evaluation /confusional state    History taken from: patient chart family    Chief complaint: Confusional state       SUBJECTIVE:    History of present illness: Background per H&P: Yehuda Weaver is a 69 y.o. male who was evaluated in room 4024 at [facility]    Source of information is the patient, his wife and also the medical records    He has history of seizures and is followed by neurologist  Rarely he will have some complex partial type seizures    He was confused yesterday  He is doing great today and wants to go home      His glucose was significantly elevated    No fever or neck stiffness or other issues    He denies noncompliance    No falls or injuries    No generalized convulsive type seizures      Vital signs okay otherwise, there was 1 heart rate documented at rate of 121  pO2 76  Glucose 461  Sodium 132.  White count 11.83  Hemoglobin A1c 15.50      CT head unremarkable    History of epilepsy          As per admitting,  Yehuda Weaver is a 69 y.o. male significant past medical history of COPD, CAD, diabetes, bladder cancer, asthma, peripheral arterial disease, hyperlipidemia, hypertension, seizures, DVT, brought in by family due to alter mental status. Per son at bedside, patient has been awake since 0130am and has been pacing around home, having stare up episodes, also having visual hallucinations, so they were concerned about this new symptoms so they decided to bring him in. Patient does not recall events and reports he was brought in because he almost \"black out\". He denies any chest pain, shortness of breath, palpitations, dizziness, focal weakness, nausea, vomiting, abdominal pain. In the ER patient noted to have a wbc of 11.8, glucose over 400's, Pt ws given IVF in the ED. He will be admitted for further management and evaluation.           - Portions of " the above HPI were copied from previous encounters and edited as appropriate. PMH as detailed below.     Review of Systems   No fever chills rigors or sweats  No weight issues  Sleep apnea  HEENT:  No speech problem, vision changes, facial asymmetry or pain, or neck problem  Chest: No chest pain, clubbing, cyanosis, orthopnea palpitations, history of coronary artery disease, hypertension hyperlipidemia  Pulmonary:  No shortness of air, cough or expectoration, COPD  Abdomen:  No swelling/tension, constipation,diarrhea or pain, history of diverticulitis  No genitourinary symptoms now but history of bladder cancer  Extremity problems as discussed, peripheral arterial disease and peripheral vascular disease  No back problem  No psychotic issues  Neurologic issues as discussed  No hematologic, dermatologic or endocrine problems, diabetes mellitus, history of DVT    , Coronary artery disease    PATIENT HISTORY:  Past Medical History:   Diagnosis Date    Arterial stent thrombosis     Arthritis     Asthma     Atherosclerosis of native arteries of extremities with intermittent claudication, bilateral legs 01/22/2020    Bladder cancer 2004    CAD (coronary artery disease) 01/22/2020    COPD (chronic obstructive pulmonary disease) 01/22/2020    Coronary artery disease     Diabetes mellitus     Diverticulitis     DVT OF PROXIMAL LOWER LIMB 06/2022    RIGHT LOWER LEG/CHRONIC    GERD (gastroesophageal reflux disease)     Heart attack 2007    History of COVID-19 09/2022    Hx of colonic polyps     Hyperlipidemia 01/22/2020    Hypertension 01/22/2020    Long term (current) use of anticoagulants 08/19/2022    Onychomycosis     PAD (peripheral artery disease)     PVD (peripheral vascular disease)     Seizures     12/2023, 4/2024/ STARTED KEPPRA AFTER LAST SEIZURE    Sleep apnea     RECENT DIAGNOSIS NO MACHINE, STILL NEEDS TO F/U W/PCP    Slow to wake up after anesthesia    ,   Past Surgical History:   Procedure Laterality Date     ANGIOPLASTY Right 06/17/2022    fem/pop    APPENDECTOMY  1970    ARTERIOGRAM LOWER EXTREMITY Left 07/10/2023    Procedure: LEFT LOWER EXTERMITY ANGIOGRAM WITH TIBAL ARTERY ANGIOPLASTY;  Surgeon: Donna Bean Jr., MD;  Location: [facility] 18/19;  Service: Vascular;  Laterality: Left;    ARTERIOGRAM LOWER EXTREMITY WITH ANGIOPLASTY STENT Left 6/10/2024    Procedure: LEFT LOWER EXTREMITY ARTERIOGRAM WITH  ANGIOPLASTY;  Surgeon: Donna Bean Jr., MD;  Location: [facility] OR;  Service: Vascular;  Laterality: Left;    ATHRECTOMY ILIAC, FEMORAL, TIBIAL ARTERY Left 10/20/2022    Procedure: LEFT SUPERFICIAL FEMORAL ARTERY LASER ATHERECTOMY AND COVERED STENT, ANGIOPLASTY OF POPLITEAL AND TIBIAL PERINEAL TRUNK AND PHARMACOLOGIC THROMBOLYSIS;  Surgeon: Donna Bean Jr., MD;  Location: [facility] 18/19;  Service: Vascular;  Laterality: Left;    BACK SURGERY      BLADDER SURGERY  2004    cancer    COLONOSCOPY  07/22/2020    Dr. Castano    CORONARY ARTERY BYPASS GRAFT  2007    Triple coronary bypass    EKOS CATHETER PLACEMENT Right 06/16/2022    Procedure: Right lower extremity arteriogram via left groin approach with placement of thrombolysis infusion catheter;  Surgeon: Donna Bean Jr., MD;  Location: [facility] 18/19;  Service: Vascular;  Laterality: Right;    EKOS CATHETER REMOVAL N/A 06/17/2022    Procedure: EKOS CATHETER REMOVAL, RIGHT LOWER EXTREMITY ARTERIOGRAM WITH PERCUTANEOUS TRANSLUMINAL ANGIOPLASTY, ASPIRATION PNEUMBRA THROMBECTOMY, AND IVUS;  Surgeon: Yehuda Salgado MD;  Location: [facility] 18/19;  Service: Vascular;  Laterality: N/A;    FEMORAL ARTERY STENT Right 06/17/2022    FEMORAL POPLITEAL BYPASS Left 02/02/2023    Procedure: LEFT FEMORAL BELOW KNEE BYPASS INSITU GREATER SAPHENOUS VEIN;  Surgeon: Donna Bean Jr., MD;  Location: [facility];  Service: Vascular;  Laterality: Left;    HERNIA REPAIR      LEG  THROMBECTOMY/EMBOLECTOMY Right 2022    tibial artery    NECK SURGERY      PERIPHERAL ARTERIAL STENT GRAFT  2021    UPPER GASTROINTESTINAL ENDOSCOPY  2020    Dr. Castano    VASCULAR SURGERY Right    ,   Family History   Problem Relation Age of Onset    Heart failure Mother     Heart disease Mother     Diabetes Mother     Clotting disorder Father     Heart attack Father     Sleep apnea Sister     Clotting disorder Sister     Lung cancer Brother         Unsure of age    Stroke Brother     Cancer Son     Colon cancer Neg Hx     Malig Hyperthermia Neg Hx    ,   Social History     Tobacco Use    Smoking status: Former     Current packs/day: 0.00     Average packs/day: 0.5 packs/day for 54.0 years (27.0 ttl pk-yrs)     Types: Cigarettes     Start date: 1969     Quit date: 2023     Years since quittin.2    Smokeless tobacco: Never    Tobacco comments:     QUIT IN 2023   Vaping Use    Vaping status: Never Used   Substance Use Topics    Alcohol use: Not Currently    Drug use: Never   ,   Prior to Admission medications    Medication Sig Start Date End Date Taking? Authorizing Provider   albuterol (PROVENTIL) (2.5 MG/3ML) 0.083% nebulizer solution Take 2.5 mg by nebulization Every 4 (Four) Hours As Needed for Wheezing.   Yes Siria Oreilly MD   albuterol sulfate  (90 Base) MCG/ACT inhaler Inhale 2 puffs Every 4 (Four) Hours As Needed for Wheezing.   Yes Siria Oreilly MD   Aspirin Low Dose 81 MG EC tablet Take 1 tablet by mouth Daily. 23  Yes Siria Oreilly MD   atorvastatin (LIPITOR) 80 MG tablet Take 1 tablet by mouth Every Night. 21  Yes Siria Oreilly MD   cetirizine (zyrTEC) 10 MG tablet Take 1 tablet by mouth Daily. 25  Yes Siria Oreilly MD   dilTIAZem XR (DILACOR XR) 240 MG 24 hr capsule Take 1 capsule by mouth Daily.   Yes Siria Oreilly MD   doxazosin (CARDURA) 4 MG tablet Take 1 tablet by mouth Every Night. 21  Yes  ProviderSiria MD   Eliquis 5 MG tablet tablet Take 1 tablet by mouth 2 (Two) Times a Day. PT STATES HOLDING FOR 2 DAYS PRIOR TO SURGERY 8/8/22  Yes Siria Oreilly MD   FeroSul 325 (65 Fe) MG tablet Take 1 tablet by mouth Daily. 2/7/24  Yes Siria Oreilly MD   fluticasone (FLONASE) 50 MCG/ACT nasal spray Administer 1 spray into the nostril(s) as directed by provider Every Night. 9/15/21  Yes Siria Oreilly MD   Fluticasone-Salmeterol (ADVAIR DISKUS IN) Inhale 1 Inhalation Daily As Needed.   Yes Siria Oreilly MD   levETIRAcetam (KEPPRA) 750 MG tablet Take 1 tablet by mouth Every 12 (Twelve) Hours for 30 days. 4/23/24 5/14/25 Yes Emil Barragan MD   lisinopril-hydrochlorothiazide (PRINZIDE,ZESTORETIC) 20-25 MG per tablet Take 1 tablet by mouth Daily. 8/8/21  Yes Siria Oreilly MD   metFORMIN ER (GLUCOPHAGE-XR) 500 MG 24 hr tablet Take 2 tablets by mouth Every 12 (Twelve) Hours. HOLDING FOR 24 HOURS PRIOR TO SURGERY PER MD GUIDELINE 4/7/24  Yes Siria Oreilly MD   metoprolol succinate XL (TOPROL-XL) 100 MG 24 hr tablet Take 1 tablet by mouth Daily. 5/27/21  Yes Siria Oreilly MD   montelukast (SINGULAIR) 10 MG tablet Take 1 tablet by mouth Daily. 8/8/21  Yes Siria Oreilly MD   multivitamin with minerals (One-A-Day Mens 50+ Advantage) tablet tablet Take 1 tablet by mouth Daily. HOLDING FOR DOS   Yes Siria Oreilly MD   Omega-3 Fatty Acids (fish oil) 1200 MG capsule capsule Take 1 capsule by mouth Daily. HOLDING FOR DOS   Yes Siria Oreilly MD   omeprazole (priLOSEC) 40 MG capsule Take 1 capsule by mouth Daily.   Yes Siria Oreilly MD   pyridoxine (VITAMIN B-6) 25 MG tablet Take 1 tablet by mouth Daily. 3/25/25  Yes Siria Oreilly MD    Allergies:  Penicillins    Current Facility-Administered Medications   Medication Dose Route Frequency Provider Last Rate Last Admin    albuterol (PROVENTIL) nebulizer solution 0.083% 2.5 mg/3mL   2.5 mg Nebulization Q4H PRN Thony Villanueva MD        apixaban (ELIQUIS) tablet 5 mg  5 mg Oral BID Thony Villanueva MD   5 mg at 05/15/25 0839    aspirin EC tablet 81 mg  81 mg Oral Daily Thony Villanueva MD   81 mg at 05/15/25 0828    atorvastatin (LIPITOR) tablet 80 mg  80 mg Oral Nightly Thony Villanueva MD   80 mg at 05/15/25 0047    sennosides-docusate (PERICOLACE) 8.6-50 MG per tablet 2 tablet  2 tablet Oral BID PRN Thony Villanueva MD        And    polyethylene glycol (MIRALAX) packet 17 g  17 g Oral Daily PRN Thony Villanueva MD        And    bisacodyl (DULCOLAX) EC tablet 5 mg  5 mg Oral Daily PRN Thony Villanueva MD        And    bisacodyl (DULCOLAX) suppository 10 mg  10 mg Rectal Daily PRN Thony Villanueva MD        cetirizine (zyrTEC) tablet 10 mg  10 mg Oral Daily Thony Villanueva MD   10 mg at 05/15/25 0828    dextrose (D50W) (25 g/50 mL) IV injection 25 g  25 g Intravenous Q15 Min PRN Thony Villanueva MD        dextrose (GLUTOSE) oral gel 15 g  15 g Oral Q15 Min PRN Thony Villanueva MD        [Held by provider] dilTIAZem CD (CARDIZEM CD) 24 hr capsule 240 mg  240 mg Oral Q24H Thony Villanueva MD   240 mg at 05/15/25 0828    doxazosin (CARDURA) tablet 4 mg  4 mg Oral Nightly Thony Villanueva MD   4 mg at 05/15/25 0047    glucagon (GLUCAGEN) injection 1 mg  1 mg Intramuscular Q15 Min PRN Thony Villanueva MD        [Held by provider] lisinopril (PRINIVIL,ZESTRIL) tablet 20 mg  20 mg Oral Q24H Thony Villanueva MD   20 mg at 05/15/25 0828    And    [Held by provider] hydroCHLOROthiazide tablet 25 mg  25 mg Oral Q24H Thony Villanueva MD   25 mg at 05/15/25 0828    insulin glargine (LANTUS, SEMGLEE) injection 10 Units  10 Units Subcutaneous Nightly Thony Villanueva MD   10 Units at 05/15/25 0046    Insulin Lispro (humaLOG) injection 2-7 Units  2-7 Units Subcutaneous 4x Daily AC & at Bedtime Thony Villanueva MD   4 Units at 05/15/25 0839    levETIRAcetam (KEPPRA) tablet  750 mg  750 mg Oral Q12H Thony Villanueva MD   750 mg at 05/15/25 0839    metoprolol succinate XL (TOPROL-XL) 24 hr tablet 100 mg  100 mg Oral Daily Thony Villanueva MD   100 mg at 05/15/25 0828    montelukast (SINGULAIR) tablet 10 mg  10 mg Oral Daily Thony Villanueva MD   10 mg at 05/15/25 0828    pantoprazole (PROTONIX) EC tablet 40 mg  40 mg Oral Q AM Thony Villanueva MD   40 mg at 05/15/25 0544    sodium chloride 0.9 % flush 10 mL  10 mL Intravenous PRN Thony Villanueva MD        sodium chloride 0.9 % flush 10 mL  10 mL Intravenous Q12H Thony Villanueva MD   10 mL at 05/15/25 0828    sodium chloride 0.9 % flush 10 mL  10 mL Intravenous PRN Thony Villanueva MD        sodium chloride 0.9 % infusion 40 mL  40 mL Intravenous PRN Thony Villanueva MD            ________________________________________________________        OBJECTIVE:  Upon today's exam, the patient is sitting at the edge of the bed in no acute distress      The patient is awake and alert and oriented x3  Normal speech  Cranial nerve examination demonstrate:  Full fields of vision to confrontation  Pupils are round, reactive to light and accommodation and size of about 3 mm  No ptosis or nystagmus  Funduscopic examination was not successful  Eye movements are conjugate     Sensation on the face and scalp are normal  Muscles of mastication are normal and symmetric  Muscles of  facial expression are normal and symmetric  Hearing is intact bilaterally  Head turning and shoulder shrugs were unremarkable  Tongue was midline  I could not visualize  oropharynx or uvula     Motor examination:  Normal bulk, tone and strength was 5/5  No fasciculations     Sensory examination:  Intact for soft touch, pain   Romberg was not evaluated     Reflexes:  0/4     Coordination:  Normal finger-to-nose to finger, rapid alternating movements and toe to finger     Gait:  Deferred     Toe signs:  [place]             ________________________________________________________   RESULTS REVIEW:    VITAL SIGNS:   Temp:  [98.4 °F (36.9 °C)-99 °F (37.2 °C)] 98.8 °F (37.1 °C)  Heart Rate:  [] 85  Resp:  [16-20] 18  BP: (103-172)/(63-99) 103/63     LABS:      Lab 05/15/25  0507 05/14/25  1407 05/14/25  1200   WBC 9.16  --  11.83*   HEMOGLOBIN 14.7  --  15.1   HEMATOCRIT 43.3  --  43.2   PLATELETS 211  --  216   NEUTROS ABS 6.21  --  9.87*   IMMATURE GRANS (ABS) 0.04  --  0.04   LYMPHS ABS 2.07  --  1.35   MONOS ABS 0.77  --  0.53   EOS ABS 0.03  --  0.00   MCV 89.1  --  88.3   LACTATE  --  1.2  --          Lab 05/15/25  0507 05/14/25  1200   SODIUM 135* 132*   POTASSIUM 4.0 4.4   CHLORIDE 100 94*   CO2 22.3 19.0*   ANION GAP 12.7 19.0*   BUN 20 26*   CREATININE 0.88 1.27   EGFR 93.1 61.2   GLUCOSE 240* 461*   CALCIUM 8.7 9.4   HEMOGLOBIN A1C  --  15.50*   TSH  --  1.410         Lab 05/15/25  0507 05/14/25  1200   TOTAL PROTEIN 6.5 7.6   ALBUMIN 4.2 4.6   GLOBULIN 2.3 3.0   ALT (SGPT) 23 26   AST (SGOT) 16 19   BILIRUBIN 0.6 0.6   ALK PHOS 66 78         Lab 05/14/25  1830 05/14/25  1200   HSTROP T 23* 24*                 Lab 05/14/25  1407   PH, ARTERIAL 7.427   PCO2, ARTERIAL 31.7*   PO2 ART 76.0*   O2 SATURATION ART 95.6   HCO3 ART 20.9*   BASE EXCESS ART -2.5*     UA          6/7/2024    08:58 5/14/2025    12:05   Urinalysis   Specific Gravity, UA 1.014  1.029    Ketones, UA Negative  15 mg/dL (1+)    Blood, UA Negative  Negative    Leukocytes, UA Trace  Negative    Nitrite, UA Negative  Negative        Lab Results   Component Value Date    TSH 1.410 05/14/2025    HGBA1C 15.50 (H) 05/14/2025    [id number] 363 04/21/2024       IMAGING STUDIES:  CT Head Without Contrast  Result Date: 5/14/2025  Impression: 1.No acute intracranial abnormality identified. 2.Chronic microvascular ischemic changes Electronically Signed: Josiah Correia MD  5/14/2025 2:51 PM EDT  Workstation ID: [id number]    XR Chest 1 View  Result Date:  5/14/2025  Impression: No acute cardiopulmonary findings. Electronically Signed: Rudy Stewart MD  5/14/2025 1:42 PM EDT  Workstation ID: [id number]      I reviewed the patient's new clinical results.    ________________________________________________________     PROBLEM LIST:    Acute metabolic encephalopathy            ASSESSMENT/PLAN:  Acute encephalopathy, resolved  Could be multifactorial, related to his hyperglycemia and possible noncompliance  Questionable seizure but he is doing well    He is neurologically stable and he may be discharged per neurology to follow-up with established neurologist    At present I do not have enough indication to change his medication  Or add another medication    Nothing suggesting CNS infection or status    Call me if needed      I discussed the patient's findings and my recommendations with patient and family    Deepika Boles MD  05/15/25  09:42 EDT

## 2025-05-16 VITALS
HEIGHT: 67 IN | DIASTOLIC BLOOD PRESSURE: 68 MMHG | WEIGHT: 163.58 LBS | RESPIRATION RATE: 18 BRPM | HEART RATE: 93 BPM | OXYGEN SATURATION: 95 % | SYSTOLIC BLOOD PRESSURE: 124 MMHG | TEMPERATURE: 97.3 F | BODY MASS INDEX: 25.67 KG/M2

## 2025-05-16 LAB
ALBUMIN SERPL-MCNC: 4 G/DL (ref 3.5–5.2)
ALBUMIN/GLOB SERPL: 1.5 G/DL
ALP SERPL-CCNC: 72 U/L (ref 39–117)
ALT SERPL W P-5'-P-CCNC: 23 U/L (ref 1–41)
ANION GAP SERPL CALCULATED.3IONS-SCNC: 12.5 MMOL/L (ref 5–15)
AST SERPL-CCNC: 16 U/L (ref 1–40)
BILIRUB SERPL-MCNC: 0.6 MG/DL (ref 0–1.2)
BUN SERPL-MCNC: 21 MG/DL (ref 8–23)
BUN/CREAT SERPL: 20.4 (ref 7–25)
CALCIUM SPEC-SCNC: 8.9 MG/DL (ref 8.6–10.5)
CHLORIDE SERPL-SCNC: 99 MMOL/L (ref 98–107)
CO2 SERPL-SCNC: 22.5 MMOL/L (ref 22–29)
CREAT SERPL-MCNC: 1.03 MG/DL (ref 0.76–1.27)
DEPRECATED RDW RBC AUTO: 42.5 FL (ref 37–54)
EGFRCR SERPLBLD CKD-EPI 2021: 78.6 ML/MIN/1.73
ERYTHROCYTE [DISTWIDTH] IN BLOOD BY AUTOMATED COUNT: 13.2 % (ref 12.3–15.4)
GLOBULIN UR ELPH-MCNC: 2.7 GM/DL
GLUCOSE BLDC GLUCOMTR-MCNC: 255 MG/DL (ref 70–99)
GLUCOSE BLDC GLUCOMTR-MCNC: 278 MG/DL (ref 70–99)
GLUCOSE SERPL-MCNC: 225 MG/DL (ref 65–99)
HCT VFR BLD AUTO: 44.7 % (ref 37.5–51)
HGB BLD-MCNC: 15.4 G/DL (ref 13–17.7)
MCH RBC QN AUTO: 30.8 PG (ref 26.6–33)
MCHC RBC AUTO-ENTMCNC: 34.5 G/DL (ref 31.5–35.7)
MCV RBC AUTO: 89.4 FL (ref 79–97)
PLATELET # BLD AUTO: 219 10*3/MM3 (ref 140–450)
PMV BLD AUTO: 11.3 FL (ref 6–12)
POTASSIUM SERPL-SCNC: 3.5 MMOL/L (ref 3.5–5.2)
PROT SERPL-MCNC: 6.7 G/DL (ref 6–8.5)
RBC # BLD AUTO: 5 10*6/MM3 (ref 4.14–5.8)
SODIUM SERPL-SCNC: 134 MMOL/L (ref 136–145)
WBC NRBC COR # BLD AUTO: 9.38 10*3/MM3 (ref 3.4–10.8)

## 2025-05-16 PROCEDURE — 80053 COMPREHEN METABOLIC PANEL: CPT | Performed by: INTERNAL MEDICINE

## 2025-05-16 PROCEDURE — 85027 COMPLETE CBC AUTOMATED: CPT | Performed by: INTERNAL MEDICINE

## 2025-05-16 PROCEDURE — 82948 REAGENT STRIP/BLOOD GLUCOSE: CPT | Performed by: STUDENT IN AN ORGANIZED HEALTH CARE EDUCATION/TRAINING PROGRAM

## 2025-05-16 PROCEDURE — 99239 HOSP IP/OBS DSCHRG MGMT >30: CPT | Performed by: INTERNAL MEDICINE

## 2025-05-16 PROCEDURE — 97161 PT EVAL LOW COMPLEX 20 MIN: CPT

## 2025-05-16 PROCEDURE — 63710000001 INSULIN LISPRO (HUMAN) PER 5 UNITS: Performed by: STUDENT IN AN ORGANIZED HEALTH CARE EDUCATION/TRAINING PROGRAM

## 2025-05-16 RX ORDER — LEVETIRACETAM 750 MG/1
750 TABLET ORAL EVERY 12 HOURS
Qty: 60 TABLET | Refills: 0 | Status: SHIPPED | OUTPATIENT
Start: 2025-05-16 | End: 2025-06-15

## 2025-05-16 RX ORDER — DIPHENHYDRAMINE HYDROCHLORIDE 25 MG/1
1 CAPSULE, LIQUID FILLED ORAL
Qty: 1 EACH | Refills: 0 | Status: SHIPPED | OUTPATIENT
Start: 2025-05-16 | End: 2026-05-16

## 2025-05-16 RX ORDER — METOPROLOL SUCCINATE 100 MG/1
50 TABLET, EXTENDED RELEASE ORAL DAILY
Start: 2025-05-16

## 2025-05-16 RX ORDER — METFORMIN HYDROCHLORIDE 500 MG/1
1000 TABLET, EXTENDED RELEASE ORAL
Start: 2025-05-16

## 2025-05-16 RX ORDER — INSULIN LISPRO 100 [IU]/ML
0-7 INJECTION, SOLUTION INTRAVENOUS; SUBCUTANEOUS
Qty: 15 ML | Refills: 2 | Status: SHIPPED | OUTPATIENT
Start: 2025-05-16 | End: 2025-05-16 | Stop reason: HOSPADM

## 2025-05-16 RX ORDER — LANCETS 33 GAUGE
1 EACH MISCELLANEOUS DAILY
Qty: 30 EACH | Refills: 0 | Status: SHIPPED | OUTPATIENT
Start: 2025-05-16 | End: 2025-06-15

## 2025-05-16 RX ORDER — METOPROLOL SUCCINATE 50 MG/1
50 TABLET, EXTENDED RELEASE ORAL DAILY
Status: DISCONTINUED | OUTPATIENT
Start: 2025-05-16 | End: 2025-05-16 | Stop reason: HOSPADM

## 2025-05-16 RX ORDER — GLUCOSAMINE HCL/CHONDROITIN SU 500-400 MG
1 CAPSULE ORAL
Qty: 150 EACH | Refills: 0 | Status: SHIPPED | OUTPATIENT
Start: 2025-05-16 | End: 2025-06-15

## 2025-05-16 RX ADMIN — PANTOPRAZOLE SODIUM 40 MG: 40 TABLET, DELAYED RELEASE ORAL at 05:29

## 2025-05-16 RX ADMIN — LEVETIRACETAM 750 MG: 750 TABLET, FILM COATED ORAL at 08:35

## 2025-05-16 RX ADMIN — APIXABAN 5 MG: 5 TABLET, FILM COATED ORAL at 08:35

## 2025-05-16 RX ADMIN — ASPIRIN 81 MG: 81 TABLET, COATED ORAL at 08:35

## 2025-05-16 RX ADMIN — Medication 10 ML: at 08:36

## 2025-05-16 RX ADMIN — INSULIN LISPRO 4 UNITS: 100 INJECTION, SOLUTION INTRAVENOUS; SUBCUTANEOUS at 08:35

## 2025-05-16 RX ADMIN — MONTELUKAST 10 MG: 10 TABLET, FILM COATED ORAL at 08:35

## 2025-05-16 RX ADMIN — CETIRIZINE HYDROCHLORIDE 10 MG: 10 TABLET, FILM COATED ORAL at 08:35

## 2025-05-16 NOTE — DISCHARGE SUMMARY
[facility]         HOSPITALIST  DISCHARGE SUMMARY    Patient Name: Yehuda Weaver  : 1955  MRN: [id number]    Date of Admission: 2025  Date of Discharge:  25  Primary Care Physician: Koby Hernandez MD    Consults       Date and Time Order Name Status Description    2025  5:26 PM Inpatient Neurology Consult General Completed     2025  3:34 PM Hospitalist (on-call MD unless specified)              Active and Resolved Hospital Problems:  Acute metabolic encephalopathy  Uncontrolled type 2 diabetes with hyperglycemia  Anion gap metabolic acidosis  Essential hypertension  Atrial fibrillation on Eliquis  Peripheral artery disease  Seizure disorder on Keppra  History of CAD    Hospital Course     Hospital Course:  Yehuda Weaver is a 69 y.o. male with CAD, hypertension, A-fib on Eliquis, uncontrolled type 2 diabetes, COPD, peripheral artery disease, seizure disorder on Keppra who was brought in for reports of confusion, erratic behavior, visual hallucinations, memory loss.  Initial blood pressure soft otherwise vital stable.  No focal neurological deficits.  CT head negative.  Serum glucose 461, anion gap 19, alcohol negative negative, ABG unremarkable, TSH normal, infectious workup negative. Seen by neurology, had no overt seizure activity while in the hospital, unclear how compliant he was with Keppra, recommending continuing previous regimen, outpatient neurology follow-up..  Hemoglobin A1c above 15. Had only been taking metformin XR.  UA 3+ glucose, 1+ ketones.  0.1 g/day estimated 24-hour urine protein excretion.  Agreeable to take insulin, education on home use provided, reinforced glucose monitoring, diabetic diet, follow-up with diabetic care clinic schedule 2025.  Has follow-up with PCP next month.  Discharged home in stable condition    Day of Discharge     Vital Signs:  Temp:  [97.3 °F (36.3 °C)-98.6 °F (37 °C)] 98.6 °F (37 °C)  Heart Rate:  [75-97]  93  Resp:  [18] 18  BP: ()/(60-73) 100/64  Physical Exam:   GENERAL: Conversant and nontoxic.  HEART: No edema  LUNGS: nonlabored  ABDOMEN: Nondistended  NEUROLOGIC: Alert    Discharge Details        Discharge Medications        New Medications        Instructions Start Date   Alcohol Swabs 70 % pads   1 each, Not Applicable, 5 Times Daily      Blood Glucose Monitor System w/Device kit   1 each, Not Applicable, Every 3 Years      glucose blood test strip   1 each, Other, 5 Times Daily, Use as instructed      Insulin Glargine 100 UNIT/ML injection pen  Commonly known as: LANTUS SOLOSTAR   10 Units, Subcutaneous, 2 Times Daily      Insulin Lispro (1 Unit Dial) 100 UNIT/ML solution pen-injector  Commonly known as: HumaLOG KwikPen   0-7 Units, Subcutaneous, 3 Times Daily Before Meals, Blood Glucose 150-199 mg/dL - 2 units Blood Glucose 200-249 mg/dL - 3 units Blood Glucose 250-299 mg/dL - 4 units Blood Glucose 300-349 mg/dL - 5 units Blood Glucose 350-400 mg/dL - 6 units Blood Glucose Greater Than 400 mg/dL - 7 units      Insulin Pen Needle 32G X 4 MM misc   1 each, Not Applicable, 5 Times Daily      Lancets 33G misc   1 each, Not Applicable, Daily             Changes to Medications        Instructions Start Date   levETIRAcetam 750 MG tablet  Commonly known as: KEPPRA  What changed: when to take this   750 mg, Oral, Every 12 Hours      metFORMIN  MG 24 hr tablet  Commonly known as: GLUCOPHAGE-XR  What changed:   when to take this  additional instructions   1,000 mg, Oral, Daily With Breakfast      metoprolol succinate  MG 24 hr tablet  Commonly known as: TOPROL-XL  What changed: how much to take   50 mg, Oral, Daily             Continue These Medications        Instructions Start Date   ADVAIR DISKUS IN   1 Inhalation, Inhalation, Daily PRN      albuterol (2.5 MG/3ML) 0.083% nebulizer solution  Commonly known as: PROVENTIL   2.5 mg, Nebulization, Every 4 Hours PRN      albuterol sulfate  (90  Base) MCG/ACT inhaler  Commonly known as: PROVENTIL HFA;VENTOLIN HFA;PROAIR HFA   2 puffs, Inhalation, Every 4 Hours PRN      Aspirin Low Dose 81 MG EC tablet  Generic drug: aspirin   81 mg, Oral, Daily      atorvastatin 80 MG tablet  Commonly known as: LIPITOR   80 mg, Oral, Nightly      cetirizine 10 MG tablet  Commonly known as: zyrTEC   10 mg, Oral, Daily      doxazosin 4 MG tablet  Commonly known as: CARDURA   4 mg, Oral, Nightly      Eliquis 5 MG tablet tablet  Generic drug: apixaban   5 mg, Oral, 2 Times Daily, PT STATES HOLDING FOR 2 DAYS PRIOR TO SURGERY      FeroSul 325 (65 Fe) MG tablet  Generic drug: ferrous sulfate   1 tablet, Oral, Daily      fish oil 1200 MG capsule capsule   1,200 mg, Oral, Daily, HOLDING FOR DOS      fluticasone 50 MCG/ACT nasal spray  Commonly known as: FLONASE   1 spray, Nasal, Nightly      montelukast 10 MG tablet  Commonly known as: SINGULAIR   10 mg, Oral, Daily      omeprazole 40 MG capsule  Commonly known as: priLOSEC   Take 1 capsule by mouth Daily.      One-A-Day Mens 50+ Advantage tablet tablet  Generic drug: multivitamin with minerals   1 tablet, Oral, Daily, HOLDING FOR DOS      pyridoxine 25 MG tablet  Commonly known as: VITAMIN B-6   25 mg, Oral, Daily             Stop These Medications      dilTIAZem  MG 24 hr capsule  Commonly known as: DILACOR XR     lisinopril-hydrochlorothiazide 20-25 MG per tablet  Commonly known as: PRINZIDE,ZESTORETIC              Allergies   Allergen Reactions    Penicillins Anaphylaxis       Discharge Disposition:  Home or Self Care    Diet:  Hospital:  Diet Order   Procedures    Diet: Cardiac, Diabetic; Healthy Heart (2-3 Na+); Consistent Carbohydrate; Fluid Consistency: Thin (IDDSI 0)       Discharge Activity:   Activity Instructions       Activity as Tolerated              CODE STATUS:  Code Status and Medical Interventions: CPR (Attempt to Resuscitate); Full Support   Ordered at: 05/14/25 0667     Code Status (Patient has no pulse  and is not breathing):    CPR (Attempt to Resuscitate)     Medical Interventions (Patient has pulse or is breathing):    Full Support         Future Appointments   Date Time Provider Department Center   6/2/2025  9:30 AM NURSE/MA [facility] [place] [facility] GE [facility]   6/5/2025 10:20 AM Ronnell Ayala APRN [facility] DIAB RAD [facility]   12/12/2025 10:00 AM Rodolfo Nicole MD [facility] CD BRAND [facility]       Additional Instructions for the Follow-ups that You Need to Schedule       Ambulatory Referral to Neurology   As directed      First available appointment    Discharge Follow-up with PCP   As directed       Currently Documented PCP:    Koby Hernandez MD    PCP Phone Number:    [phone]     Follow Up Details: 1 week                Pertinent  and/or Most Recent Results     PROCEDURES:  NONE    LAB RESULTS:      Lab 05/16/25  0454 05/15/25  0507 05/14/25  1407 05/14/25  1200   WBC 9.38 9.16  --  11.83*   HEMOGLOBIN 15.4 14.7  --  15.1   HEMATOCRIT 44.7 43.3  --  43.2   PLATELETS 219 211  --  216   NEUTROS ABS  --  6.21  --  9.87*   IMMATURE GRANS (ABS)  --  0.04  --  0.04   LYMPHS ABS  --  2.07  --  1.35   MONOS ABS  --  0.77  --  0.53   EOS ABS  --  0.03  --  0.00   MCV 89.4 89.1  --  88.3   LACTATE  --   --  1.2  --          Lab 05/16/25  0454 05/15/25  0507 05/14/25  1200   SODIUM 134* 135* 132*   POTASSIUM 3.5 4.0 4.4   CHLORIDE 99 100 94*   CO2 22.5 22.3 19.0*   ANION GAP 12.5 12.7 19.0*   BUN 21 20 26*   CREATININE 1.03 0.88 1.27   EGFR 78.6 93.1 61.2   GLUCOSE 225* 240* 461*   CALCIUM 8.9 8.7 9.4   HEMOGLOBIN A1C  --   --  15.50*   TSH  --   --  1.410         Lab 05/16/25  0454 05/15/25  0507 05/14/25  1200   TOTAL PROTEIN 6.7 6.5 7.6   ALBUMIN 4.0 4.2 4.6   GLOBULIN 2.7 2.3 3.0   ALT (SGPT) 23 23 26   AST (SGOT) 16 16 19   BILIRUBIN 0.6 0.6 0.6   ALK PHOS 72 66 78         Lab 05/14/25  1830 05/14/25  1200   HSTROP T 23* 24*             Lab 05/15/25  0507   FOLATE 15.80   VITAMIN B 12 370         Lab 05/14/25  1407    PH, ARTERIAL 7.427   PCO2, ARTERIAL 31.7*   PO2 ART 76.0*   O2 SATURATION ART 95.6   HCO3 ART 20.9*   BASE EXCESS ART -2.5*     Brief Urine Lab Results  (Last result in the past 365 days)        Color   Clarity   Blood   Leuk Est   Nitrite   Protein   CREAT   Urine HCG        05/14/25 1205             53.6         05/14/25 1205 Yellow   Clear   Negative   Negative   Negative   Negative                 Microbiology Results (last 10 days)       Procedure Component Value - Date/Time    Blood Culture - Blood, Arm, Right [[id number]]  (Normal) Collected: 05/14/25 1835    Lab Status: Preliminary result Specimen: Blood from Arm, Right Updated: 05/15/25 1845     Blood Culture No growth at 24 hours    Narrative:      Less than seven (7) mL's of blood was collected.  Insufficient quantity may yield false negative results.    Blood Culture - Blood, Arm, Right [[id number]]  (Normal) Collected: 05/14/25 1830    Lab Status: Preliminary result Specimen: Blood from Arm, Right Updated: 05/15/25 1845     Blood Culture No growth at 24 hours    Narrative:      Less than seven (7) mL's of blood was collected.  Insufficient quantity may yield false negative results.            CT Head Without Contrast  Result Date: 5/14/2025  Impression: 1.No acute intracranial abnormality identified. 2.Chronic microvascular ischemic changes Electronically Signed: Josiah Correia MD  5/14/2025 2:51 PM EDT  Workstation ID: [id number]    XR Chest 1 View  Result Date: 5/14/2025  Impression: No acute cardiopulmonary findings. Electronically Signed: Rudy Stewart MD  5/14/2025 1:42 PM EDT  Workstation ID: [id number]       Results for orders placed during the hospital encounter of 06/05/24    Doppler Ankle Brachial Index Single Level CAR    Interpretation Summary    Right Conclusion: The right RENEE is normal.    Left Conclusion: The left RENEE is severely reduced.      Results for orders placed during the hospital encounter of 06/05/24    Doppler Ankle Brachial Index  Single Level CAR    Interpretation Summary    Right Conclusion: The right RENEE is normal.    Left Conclusion: The left RENEE is severely reduced.      Results for orders placed during the hospital encounter of 03/31/25    Adult Transthoracic Echo Complete W/ Cont if Necessary Per Protocol    Interpretation Summary    Left ventricular systolic function is normal. Calculated left ventricular EF = 60.6%    Left ventricular wall thickness is consistent with concentric hypertrophy.    Estimated right ventricular systolic pressure from tricuspid regurgitation is normal (<35 mmHg).    No significant valve abnormalities noted.      Labs Pending at Discharge:  Pending Labs       Order Current Status    Levetiracetam Level (Keppra) In process    Blood Culture - Blood, Arm, Right Preliminary result    Blood Culture - Blood, Arm, Right Preliminary result              Time spent on Discharge including face to face service:  33 minutes    Electronically signed by Panda Mendosa DO, 05/16/25, 10:16 AM EDT.

## 2025-05-16 NOTE — CONSULTS
Followed up with patient and wife at bedside. Patient and wife were both able to demonstrate insulin administration using the insulin pen. Per MD, patient will dc with only Lantus BID, with the potential to add SSI at his follow up appointment if he is requiring more insulin.     Provided patient with coupons for Lantus and Humalog as well as an appointment reminder card and my phone number in case he has further questions. Reiterated the importance of ADA guidelines. No further needs or questions at this time. Plans on discharging later today.    Recommend on discharge:  Rx for Lantus Solostar insulin pen (coupon provided)  Rx for insulin pen needles (pended)  Rx for alcohol swabs (pended)  Rx for glucometer, lancets, and test strips (pended)

## 2025-05-16 NOTE — PLAN OF CARE
Goal Outcome Evaluation:              Outcome Evaluation: patient discharging to home with wife

## 2025-05-16 NOTE — PLAN OF CARE
Goal Outcome Evaluation:  Plan of Care Reviewed With: patient        Progress: improving  Outcome Evaluation: pt is A&O x4. On room air. Wife at bedside. Medicated per MAR. Blood glucose monitored and given supplemental insulin. No other concerns at this time. Continue plan of care.

## 2025-05-16 NOTE — THERAPY EVALUATION
Acute Care - Physical Therapy Initial Evaluation   Tho     Patient Name: Yehuda Weaver  : 1955  MRN: [id number]  Today's Date: 2025      Visit Dx:     ICD-10-CM ICD-9-CM   1. Hyperosmolar syndrome  E87.0 276.0   2. Altered mental status, unspecified altered mental status type  R41.82 780.97   3. Hyperglycemia  R73.9 790.29   4. Impaired mobility and ADLs  Z74.09 V49.89    Z78.9    5. Seizure  R56.9 780.39   6. History of seizure  Z87.898 V13.89   7. Difficulty walking  R26.2 719.7     Patient Active Problem List   Diagnosis    Chronic obstructive pulmonary disease    Coronary artery disease    Diverticulitis    Hyperlipidemia    Hypertension    Arterial stent thrombosis, initial encounter    Atherosclerosis of native arteries of extremities with intermittent claudication, bilateral legs    Tobacco dependence due to cigarettes    PVD (peripheral vascular disease)    Seizure    Atherosclerosis of bypass graft of left lower extremity with intermittent claudication    Acute metabolic encephalopathy     Past Medical History:   Diagnosis Date    Arterial stent thrombosis     Arthritis     Asthma     Atherosclerosis of native arteries of extremities with intermittent claudication, bilateral legs 2020    Bladder cancer 2004    CAD (coronary artery disease) 2020    COPD (chronic obstructive pulmonary disease) 2020    Coronary artery disease     Diabetes mellitus     Diverticulitis     DVT OF PROXIMAL LOWER LIMB 2022    RIGHT LOWER LEG/CHRONIC    GERD (gastroesophageal reflux disease)     Heart attack 2007    History of COVID-19 2022    Hx of colonic polyps     Hyperlipidemia 2020    Hypertension 2020    Long term (current) use of anticoagulants 2022    Onychomycosis     PAD (peripheral artery disease)     PVD (peripheral vascular disease)     Seizures     2023, 2024/ STARTED KEPPRA AFTER LAST SEIZURE    Sleep apnea     RECENT DIAGNOSIS NO MACHINE, STILL NEEDS  TO F/U W/PCP    Slow to wake up after anesthesia      Past Surgical History:   Procedure Laterality Date    ANGIOPLASTY Right 06/17/2022    fem/pop    APPENDECTOMY  1970    ARTERIOGRAM LOWER EXTREMITY Left 07/10/2023    Procedure: LEFT LOWER EXTERMITY ANGIOGRAM WITH TIBAL ARTERY ANGIOPLASTY;  Surgeon: Donna Bean Jr., MD;  Location: [facility] 18/19;  Service: Vascular;  Laterality: Left;    ARTERIOGRAM LOWER EXTREMITY WITH ANGIOPLASTY STENT Left 6/10/2024    Procedure: LEFT LOWER EXTREMITY ARTERIOGRAM WITH  ANGIOPLASTY;  Surgeon: Donna Bean Jr., MD;  Location: [facility] OR;  Service: Vascular;  Laterality: Left;    ATHRECTOMY ILIAC, FEMORAL, TIBIAL ARTERY Left 10/20/2022    Procedure: LEFT SUPERFICIAL FEMORAL ARTERY LASER ATHERECTOMY AND COVERED STENT, ANGIOPLASTY OF POPLITEAL AND TIBIAL PERINEAL TRUNK AND PHARMACOLOGIC THROMBOLYSIS;  Surgeon: Donna Bean Jr., MD;  Location: [facility] 18/19;  Service: Vascular;  Laterality: Left;    BACK SURGERY      BLADDER SURGERY  2004    cancer    COLONOSCOPY  07/22/2020    Dr. Castano    CORONARY ARTERY BYPASS GRAFT  2007    Triple coronary bypass    EKOS CATHETER PLACEMENT Right 06/16/2022    Procedure: Right lower extremity arteriogram via left groin approach with placement of thrombolysis infusion catheter;  Surgeon: Donna Bean Jr., MD;  Location: [facility] 18/19;  Service: Vascular;  Laterality: Right;    EKOS CATHETER REMOVAL N/A 06/17/2022    Procedure: EKOS CATHETER REMOVAL, RIGHT LOWER EXTREMITY ARTERIOGRAM WITH PERCUTANEOUS TRANSLUMINAL ANGIOPLASTY, ASPIRATION PNEUMBRA THROMBECTOMY, AND IVUS;  Surgeon: Yehuda Salgado MD;  Location: [facility] 18/19;  Service: Vascular;  Laterality: N/A;    FEMORAL ARTERY STENT Right 06/17/2022    FEMORAL POPLITEAL BYPASS Left 02/02/2023    Procedure: LEFT FEMORAL BELOW KNEE BYPASS INSITU GREATER SAPHENOUS VEIN;  Surgeon: Donna Bean Jr., MD;   Location: [facility] MAIN OR;  Service: Vascular;  Laterality: Left;    HERNIA REPAIR      LEG THROMBECTOMY/EMBOLECTOMY Right 06/17/2022    tibial artery    NECK SURGERY      PERIPHERAL ARTERIAL STENT GRAFT  06/25/2021    UPPER GASTROINTESTINAL ENDOSCOPY  07/22/2020    Dr. Castano    VASCULAR SURGERY Right 2022     PT Assessment (Last 12 Hours)       PT Evaluation and Treatment       Row Name 05/16/25 1102          Physical Therapy Time and Intention    Subjective Information no complaints (P)   -RA     Document Type evaluation (P)   -RA     Mode of Treatment individual therapy;physical therapy (P)   -RA     Patient Effort good (P)   -RA     Symptoms Noted During/After Treatment increased pain (P)   knee pain  -RA       Row Name 05/16/25 1102          General Information    Patient Profile Reviewed yes (P)   -RA     Patient Observations alert;cooperative;agree to therapy (P)   -RA     Prior Level of Function independent:;all household mobility;community mobility;gait;transfer;bed mobility;ADL's (P)   -RA     Equipment Currently Used at Home none (P)   access to cane  -RA     Existing Precautions/Restrictions no known precautions/restrictions (P)   -RA       Row Name 05/16/25 1102          Living Environment    Current Living Arrangements home (P)   -RA     Home Accessibility stairs to enter home (P)   -RA     People in Home significant other (P)   -RA     Primary Care Provided by self (P)   -RA       Row Name 05/16/25 1102          Home Main Entrance    Number of Stairs, Main Entrance three (P)   -RA       Row Name 05/16/25 1102          Home Use of Assistive/Adaptive Equipment    Equipment Currently Used at Home none (P)   access to cane  -RA       Row Name 05/16/25 1102          Cognition    Orientation Status (Cognition) oriented x 3 (P)   -RA       Row Name 05/16/25 1102          Range of Motion (ROM)    Range of Motion ROM is WFL (P)   -RA       Row Name 05/16/25 1102          Strength (Manual Muscle Testing)     Strength (Manual Muscle Testing) strength is WFL (P)   -RA       Row Name 05/16/25 1102          Bed Mobility    Bed Mobility supine-sit;sit-supine (P)   -RA     Supine-Sit [place] (Bed Mobility) independent (P)   -RA     Sit-Supine [place] (Bed Mobility) independent (P)   -RA       [facility] Name 05/16/25 1102          Transfers    Transfers sit-stand transfer;stand-sit transfer (P)   [facility] Name 05/16/25 1102          Sit-Stand Transfer    Sit-Stand [place] (Transfers) independent (P)   -RA       [facility] Name 05/16/25 1102          Stand-Sit Transfer    Stand-Sit [place] (Transfers) independent (P)   -RA       [facility] Name 05/16/25 1102          Gait/Stairs (Locomotion)    Gait/Stairs Locomotion gait/ambulation independence (P)   -RA     [place] Level (Gait) standby assist (P)   -RA     Assistive Device (Gait) other (see comments) (P)   no AD  -RA     Patient was able to Ambulate yes (P)   -RA     Distance in Feet (Gait) 200 (P)   -RA       [facility] Name 05/16/25 1102          Balance    Balance Assessment standing dynamic balance (P)   -RA     Dynamic Standing Balance standby assist (P)   -RA     Position/Device Used, Standing Balance other (see comments) (P)   no AD  -RA       [facility] Name 05/16/25 1106          Plan of Care Review    Outcome Evaluation patient demonstrates independence with bed mobility, transfers, and ambulation. He does not present with any safety concerns and can return to baseline function. Skilled PT services are not indicated at this time. (P)   -RA       [facility] Name 05/16/25 1102          Therapy Assessment/Plan (PT)    Criteria for Skilled Interventions Met (PT) no problems identified which require skilled intervention (P)   -RA     Therapy Frequency (PT) evaluation only (P)   -RA       Row Name 05/16/25 1102          PT Evaluation Complexity    History, PT Evaluation Complexity no personal factors and/or comorbidities (P)   -RA     Examination of Body Systems (PT Eval Complexity)  total of 4 or more elements (P)   -RA     Clinical Presentation (PT Evaluation Complexity) stable (P)   -RA     Clinical Decision Making (PT Evaluation Complexity) low complexity (P)   -RA     Overall Complexity (PT Evaluation Complexity) low complexity (P)   -RA       Row Name 05/16/25 1102          Physical Therapy Goals    Problem Specific Goal Selection (PT) problem specific goal 1, PT (P)   -RA       Row Name 05/16/25 1102          Problem Specific Goal 1 (PT)    Problem Specific Goal 1 (PT) Complete PT evaluation (P)   -RA     Time Frame (Problem Specific Goal 1, PT) 1 day (P)   -RA     Progress/Outcome (Problem Specific Goal 1, PT) goal met (P)   -RA               User Key  (r) = Recorded By, (t) = Taken By, (c) = Cosigned By      Initials Name Provider Type    Tamar Wright, PT Student PT Student                      PT Recommendation and Plan  Anticipated Discharge Disposition (PT): (P) home  Therapy Frequency (PT): (P) evaluation only  Outcome Evaluation: (P) patient demonstrates independence with bed mobility, transfers, and ambulation. He does not present with any safety concerns and can return to baseline function. Skilled PT services are not indicated at this time.   Outcome Measures       Row Name 05/16/25 1100             How much help from another person do you currently need...    Turning from your back to your side while in flat bed without using bedrails? 4 (P)   -RA      Moving from lying on back to sitting on the side of a flat bed without bedrails? 4 (P)   -RA      Moving to and from a bed to a chair (including a wheelchair)? 4 (P)   -RA      Standing up from a chair using your arms (e.g., wheelchair, bedside chair)? 4 (P)   -RA      Climbing 3-5 steps with a railing? 4 (P)   -RA      To walk in hospital room? 4 (P)   -RA      AM-PAC 6 Clicks Score (PT) 24 (P)   -LENKA                User Key  (r) = Recorded By, (t) = Taken By, (c) = Cosigned By      Initials Name Provider Type    LENKA Dey  Tamar, PT Student PT Student                     Time Calculation:    PT Charges       Row Name 05/16/25 1107             Time Calculation    PT Received On 05/16/25 (P)   -RA         Untimed Charges    PT Eval/Re-eval Minutes 20 (P)   -RA         Total Minutes    Untimed Charges Total Minutes 20 (P)   -RA       Total Minutes 20 (P)   -RA                User Key  (r) = Recorded By, (t) = Taken By, (c) = Cosigned By      Initials Name Provider Type    Tamar Wright, PT Student PT Student                      PT G-Codes  Outcome Measure Options: AM-PAC 6 Clicks Daily Activity (OT), Optimal Instrument  AM-PAC 6 Clicks Score (PT): (P) 24  AM-PAC 6 Clicks Score (OT): 24    Tamar Dey, PT Student  5/16/2025

## 2025-05-17 ENCOUNTER — READMISSION MANAGEMENT (OUTPATIENT)
Dept: CALL CENTER | Facility: HOSPITAL | Age: 70
End: 2025-05-17
Payer: MEDICARE

## 2025-05-17 LAB — LEVETIRACETAM SERPL-MCNC: 21 UG/ML (ref 10–40)

## 2025-05-17 NOTE — OUTREACH NOTE
Prep Survey      Flowsheet Row Responses   [facility] facility patient discharged from? [facility]   Is LACE score < 7 ? No   Eligibility Readm Mgmt   Discharge diagnosis Acute metabolic encephalopathy  Leukocytosis   Does the patient have one of the following disease processes/diagnoses(primary or secondary)? Other   Does the patient have Home health ordered? No   Is there a DME ordered? No   Prep survey completed? Yes            TRAMAINE ROSENBAUM - Registered Nurse

## 2025-05-19 LAB
BACTERIA SPEC AEROBE CULT: NORMAL
BACTERIA SPEC AEROBE CULT: NORMAL

## 2025-05-21 ENCOUNTER — READMISSION MANAGEMENT (OUTPATIENT)
Dept: CALL CENTER | Facility: HOSPITAL | Age: 70
End: 2025-05-21
Payer: MEDICARE

## 2025-05-28 ENCOUNTER — READMISSION MANAGEMENT (OUTPATIENT)
Dept: CALL CENTER | Facility: HOSPITAL | Age: 70
End: 2025-05-28
Payer: MEDICARE

## 2025-05-28 NOTE — OUTREACH NOTE
Medical Week 2 Survey      Flowsheet Row Responses   [facility] facility patient discharged from? [facility]   Does the patient have one of the following disease processes/diagnoses(primary or secondary)? Other   Week 2 attempt successful? No   Unsuccessful attempts Attempt 1            DIANA CHAMBERS - Registered Nurse

## 2025-06-02 ENCOUNTER — CLINICAL SUPPORT (OUTPATIENT)
Dept: GASTROENTEROLOGY | Facility: CLINIC | Age: 70
End: 2025-06-02
Payer: MEDICARE

## 2025-06-02 ENCOUNTER — PREP FOR SURGERY (OUTPATIENT)
Dept: OTHER | Facility: HOSPITAL | Age: 70
End: 2025-06-02
Payer: MEDICARE

## 2025-06-02 DIAGNOSIS — K63.5 POLYP OF DESCENDING COLON, UNSPECIFIED TYPE: ICD-10-CM

## 2025-06-02 DIAGNOSIS — K57.92 DIVERTICULITIS: Primary | ICD-10-CM

## 2025-06-02 DIAGNOSIS — Z12.11 ENCOUNTER FOR SCREENING FOR MALIGNANT NEOPLASM OF COLON: ICD-10-CM

## 2025-06-02 RX ORDER — POLYETHYLENE GLYCOL 3350, SODIUM SULFATE ANHYDROUS, SODIUM BICARBONATE, SODIUM CHLORIDE, POTASSIUM CHLORIDE 236; 22.74; 6.74; 5.86; 2.97 G/4L; G/4L; G/4L; G/4L; G/4L
4 POWDER, FOR SOLUTION ORAL ONCE
Qty: 4000 ML | Refills: 0 | Status: SHIPPED | OUTPATIENT
Start: 2025-06-02 | End: 2025-06-02

## 2025-06-02 RX ORDER — CHOLECALCIFEROL (VITAMIN D3) 25 MCG
1000 TABLET ORAL DAILY
COMMUNITY

## 2025-06-02 RX ORDER — DILTIAZEM HYDROCHLORIDE 240 MG/1
240 CAPSULE, COATED, EXTENDED RELEASE ORAL DAILY
COMMUNITY

## 2025-06-02 NOTE — PROGRESS NOTES
Yehuda Weaver  1955  70 y.o.    Reason for call: 5 year recall   If recall, please list diagnosis: Colon plyps, diverticulitis, internal hemorrhoids   Please note this diagnosis should be entered in the case request  Prep prescribed: Efraín Smart pt has kidney issues and has seizures agreed to doing 4L  Prep instructions reviewed with patient and sent to patient via [employer]  Is the patient currently on any injectable or oral medications for weight loss or diabetes? No  Clearance needed? yes  If yes, what clearance is needed? Blood thinner and CARDIAC   Clearance has been requested from Dr.Georgie RUBY AT [facility]   The patient has been scheduled for: Colonoscopy     Yehuda PEPE Weaver is aware they have been scheduled for a screening colonoscopy. Patient has expressed they are not having any symptoms at all.         After your procedure, you will be contacted with results. Please confirm the best phone # to reach the patient: [phone]  Family history of colon cancer? No  If yes, indicate relative: n.a  Tentative Procedure Date: 8/14/2025    Date/Place of last Scope: [facility] 7/22/2020  Able to obtain report? yes          Family History   Problem Relation Age of Onset    Heart failure Mother     Heart disease Mother     Diabetes Mother     Clotting disorder Father     Heart attack Father     Sleep apnea Sister     Clotting disorder Sister     Lung cancer Brother         Unsure of age    Stroke Brother     Cancer Son     Colon cancer Neg Hx     Malig Hyperthermia Neg Hx      Past Medical History:   Diagnosis Date    Arterial stent thrombosis     Arthritis     Asthma     Atherosclerosis of native arteries of extremities with intermittent claudication, bilateral legs 01/22/2020    Bladder cancer 2004    CAD (coronary artery disease) 01/22/2020    COPD (chronic obstructive pulmonary disease) 01/22/2020    Coronary artery disease     Diabetes mellitus     Diverticulitis     DVT OF PROXIMAL LOWER LIMB 06/2022     RIGHT LOWER LEG/CHRONIC    GERD (gastroesophageal reflux disease)     Heart attack 2007    History of COVID-19 09/2022    Hx of colonic polyps     Hyperlipidemia 01/22/2020    Hypertension 01/22/2020    Long term (current) use of anticoagulants 08/19/2022    Onychomycosis     PAD (peripheral artery disease)     PVD (peripheral vascular disease)     Seizures     12/2023, 4/2024/ STARTED KEPPRA AFTER LAST SEIZURE    Sleep apnea     RECENT DIAGNOSIS NO MACHINE, STILL NEEDS TO F/U W/PCP    Slow to wake up after anesthesia      Allergies   Allergen Reactions    Penicillins Anaphylaxis     Past Surgical History:   Procedure Laterality Date    ANGIOPLASTY Right 06/17/2022    fem/pop    APPENDECTOMY  1970    ARTERIOGRAM LOWER EXTREMITY Left 07/10/2023    Procedure: LEFT LOWER EXTERMITY ANGIOGRAM WITH TIBAL ARTERY ANGIOPLASTY;  Surgeon: Donna Bean Jr., MD;  Location: [facility] OR 18/19;  Service: Vascular;  Laterality: Left;    ARTERIOGRAM LOWER EXTREMITY WITH ANGIOPLASTY STENT Left 6/10/2024    Procedure: LEFT LOWER EXTREMITY ARTERIOGRAM WITH  ANGIOPLASTY;  Surgeon: Donna Bean Jr., MD;  Location: [facility] OR;  Service: Vascular;  Laterality: Left;    ATHRECTOMY ILIAC, FEMORAL, TIBIAL ARTERY Left 10/20/2022    Procedure: LEFT SUPERFICIAL FEMORAL ARTERY LASER ATHERECTOMY AND COVERED STENT, ANGIOPLASTY OF POPLITEAL AND TIBIAL PERINEAL TRUNK AND PHARMACOLOGIC THROMBOLYSIS;  Surgeon: Donna Bean Jr., MD;  Location: [facility] OR 18/19;  Service: Vascular;  Laterality: Left;    BACK SURGERY      BLADDER SURGERY  2004    cancer    COLONOSCOPY  07/22/2020    Dr. Castano    CORONARY ARTERY BYPASS GRAFT  2007    Triple coronary bypass    EKOS CATHETER PLACEMENT Right 06/16/2022    Procedure: Right lower extremity arteriogram via left groin approach with placement of thrombolysis infusion catheter;  Surgeon: Donna Bean Jr., MD;  Location: [facility] OR 18/19;  Service:  Vascular;  Laterality: Right;    EKOS CATHETER REMOVAL N/A 2022    Procedure: EKOS CATHETER REMOVAL, RIGHT LOWER EXTREMITY ARTERIOGRAM WITH PERCUTANEOUS TRANSLUMINAL ANGIOPLASTY, ASPIRATION PNEUMBRA THROMBECTOMY, AND IVUS;  Surgeon: Yehuda Salgado MD;  Location: [facility] OR ;  Service: Vascular;  Laterality: N/A;    FEMORAL ARTERY STENT Right 2022    FEMORAL POPLITEAL BYPASS Left 2023    Procedure: LEFT FEMORAL BELOW KNEE BYPASS INSITU GREATER SAPHENOUS VEIN;  Surgeon: Donna Bean Jr., MD;  Location: [facility] MAIN OR;  Service: Vascular;  Laterality: Left;    HERNIA REPAIR      LEG THROMBECTOMY/EMBOLECTOMY Right 2022    tibial artery    NECK SURGERY      PERIPHERAL ARTERIAL STENT GRAFT  2021    UPPER GASTROINTESTINAL ENDOSCOPY  2020    Dr. Castano    VASCULAR SURGERY Right      Social History     Socioeconomic History    Marital status:    Tobacco Use    Smoking status: Former     Current packs/day: 0.00     Average packs/day: 0.5 packs/day for 54.0 years (27.0 ttl pk-yrs)     Types: Cigarettes     Start date: 1969     Quit date: 2023     Years since quittin.3    Smokeless tobacco: Never    Tobacco comments:     QUIT IN 2023   Vaping Use    Vaping status: Never Used   Substance and Sexual Activity    Alcohol use: Not Currently    Drug use: Never    Sexual activity: Defer       Current Outpatient Medications:     albuterol (PROVENTIL) (2.5 MG/3ML) 0.083% nebulizer solution, Take 2.5 mg by nebulization Every 4 (Four) Hours As Needed for Wheezing., Disp: , Rfl:     albuterol sulfate  (90 Base) MCG/ACT inhaler, Inhale 2 puffs Every 4 (Four) Hours As Needed for Wheezing., Disp: , Rfl:     Alcohol Swabs 70 % pads, Use 1 each 5 (Five) Times a Day for 30 days., Disp: 150 each, Rfl: 0    Aspirin Low Dose 81 MG EC tablet, Take 1 tablet by mouth Daily., Disp: , Rfl:     atorvastatin (LIPITOR) 80 MG tablet, Take 1 tablet by mouth Every  Night., Disp: , Rfl:     Blood Glucose Monitoring Suppl (Blood Glucose Monitor System) w/Device kit, Use 1 each Every 3 (Three) Years., Disp: 1 each, Rfl: 0    cetirizine (zyrTEC) 10 MG tablet, Take 1 tablet by mouth Daily., Disp: , Rfl:     Cholecalciferol 25 MCG (1000 UT) tablet, Take 1 tablet by mouth Daily., Disp: , Rfl:     dilTIAZem CD (CARDIZEM CD) 240 MG 24 hr capsule, Take 1 capsule by mouth Daily., Disp: , Rfl:     doxazosin (CARDURA) 4 MG tablet, Take 1 tablet by mouth Every Night., Disp: , Rfl:     Eliquis 5 MG tablet tablet, Take 1 tablet by mouth 2 (Two) Times a Day. PT STATES HOLDING FOR 2 DAYS PRIOR TO SURGERY, Disp: , Rfl:     FeroSul 325 (65 Fe) MG tablet, Take 1 tablet by mouth Daily., Disp: , Rfl:     fluticasone (FLONASE) 50 MCG/ACT nasal spray, Administer 1 spray into the nostril(s) as directed by provider Every Night., Disp: , Rfl:     Fluticasone-Salmeterol (ADVAIR DISKUS IN), Inhale 1 Inhalation Daily As Needed., Disp: , Rfl:     glucose blood test strip, 1 each by Other route 5 (Five) Times a Day for 30 days. Use as instructed, Disp: 150 each, Rfl: 0    Insulin Glargine (LANTUS SOLOSTAR) 100 UNIT/ML injection pen, Inject 10 Units under the skin into the appropriate area as directed 2 (Two) Times a Day., Disp: 15 mL, Rfl: 3    Insulin Pen Needle 32G X 4 MM misc, Use 1 each 5 (Five) Times a Day for 30 days., Disp: 150 each, Rfl: 0    Lancets 33G misc, Use 1 each Daily for 30 days., Disp: 30 each, Rfl: 0    levETIRAcetam (KEPPRA) 750 MG tablet, Take 1 tablet by mouth Every 12 (Twelve) Hours for 30 days., Disp: 60 tablet, Rfl: 0    metFORMIN ER (GLUCOPHAGE-XR) 500 MG 24 hr tablet, Take 2 tablets by mouth Daily With Breakfast., Disp: , Rfl:     metoprolol succinate XL (TOPROL-XL) 100 MG 24 hr tablet, Take 0.5 tablets by mouth Daily., Disp: , Rfl:     montelukast (SINGULAIR) 10 MG tablet, Take 1 tablet by mouth Daily., Disp: , Rfl:     multivitamin with minerals (One-A-Day Mens 50+ Advantage)  tablet tablet, Take 1 tablet by mouth Daily. HOLDING FOR DOS, Disp: , Rfl:     Omega-3 Fatty Acids (fish oil) 1200 MG capsule capsule, Take 1 capsule by mouth Daily. HOLDING FOR DOS, Disp: , Rfl:     omeprazole (priLOSEC) 40 MG capsule, Take 1 capsule by mouth Daily., Disp: , Rfl:     pyridoxine (VITAMIN B-6) 25 MG tablet, Take 1 tablet by mouth Daily., Disp: , Rfl:

## 2025-06-03 ENCOUNTER — READMISSION MANAGEMENT (OUTPATIENT)
Dept: CALL CENTER | Facility: HOSPITAL | Age: 70
End: 2025-06-03
Payer: MEDICARE

## 2025-06-03 NOTE — OUTREACH NOTE
Medical Week 2 Survey      Flowsheet Row Responses   [facility] patient discharged from? [facility]   Does the patient have one of the following disease processes/diagnoses(primary or secondary)? Other   Week 2 attempt successful? Yes   Call start time 1418   Discharge diagnosis Acute metabolic encephalopathy  Leukocytosis   Call end time 1427   Meds reviewed with patient/caregiver? Yes   Is the patient having any side effects they believe may be caused by any medication additions or changes? No   Does the patient have all medications ordered at discharge? Yes   Is the patient taking all medications as directed (includes completed medication regime)? Yes   Does the patient have a primary care provider?  Yes   Comments regarding PCP Has f/u with PCP scheduled   Has the patient kept scheduled appointments due by today? N/A   Has home health visited the patient within 72 hours of discharge? N/A   Psychosocial issues? No   Did the patient receive a copy of their discharge instructions? Yes   Nursing interventions Reviewed instructions with patient   What is the patient's perception of their health status since discharge? Improving  [Pt reports he is doing better, reports  and 191 after meals. Pt BP fluctuating 143/87 and 127/70, encouraged to take readings to his f/u appt.  Reports he has some vision issues ongoing since he was in hospital. did review neuro s/s with pt]   Is the patient/caregiver able to teach back signs and symptoms related to disease process for when to call PCP? Yes   Is the patient/caregiver able to teach back signs and symptoms related to disease process for when to call 911? Yes   Week 2 Call Completed? Yes   Graduated Yes   Call end time 1427            [facility] - Registered Nurse

## 2025-06-05 ENCOUNTER — OFFICE VISIT (OUTPATIENT)
Age: 70
End: 2025-06-05
Payer: MEDICARE

## 2025-06-05 VITALS
OXYGEN SATURATION: 99 % | SYSTOLIC BLOOD PRESSURE: 107 MMHG | HEART RATE: 80 BPM | WEIGHT: 176.5 LBS | BODY MASS INDEX: 27.7 KG/M2 | DIASTOLIC BLOOD PRESSURE: 64 MMHG | TEMPERATURE: 98.1 F | HEIGHT: 67 IN

## 2025-06-05 DIAGNOSIS — Z79.4 TYPE 2 DIABETES MELLITUS WITH STAGE 2 CHRONIC KIDNEY DISEASE, WITH LONG-TERM CURRENT USE OF INSULIN: ICD-10-CM

## 2025-06-05 DIAGNOSIS — E11.22 TYPE 2 DIABETES MELLITUS WITH STAGE 2 CHRONIC KIDNEY DISEASE, WITH LONG-TERM CURRENT USE OF INSULIN: ICD-10-CM

## 2025-06-05 DIAGNOSIS — Z79.4 TYPE 2 DIABETES MELLITUS WITH HYPERGLYCEMIA, WITH LONG-TERM CURRENT USE OF INSULIN: Primary | ICD-10-CM

## 2025-06-05 DIAGNOSIS — E66.3 OVERWEIGHT WITH BODY MASS INDEX (BMI) OF 27 TO 27.9 IN ADULT: ICD-10-CM

## 2025-06-05 DIAGNOSIS — E11.65 TYPE 2 DIABETES MELLITUS WITH HYPERGLYCEMIA, WITH LONG-TERM CURRENT USE OF INSULIN: Primary | ICD-10-CM

## 2025-06-05 DIAGNOSIS — N18.2 TYPE 2 DIABETES MELLITUS WITH STAGE 2 CHRONIC KIDNEY DISEASE, WITH LONG-TERM CURRENT USE OF INSULIN: ICD-10-CM

## 2025-06-05 LAB
EXPIRATION DATE: ABNORMAL
HBA1C MFR BLD: 11.7 % (ref 4.5–5.7)
Lab: ABNORMAL

## 2025-06-05 PROCEDURE — 3078F DIAST BP <80 MM HG: CPT

## 2025-06-05 PROCEDURE — G2211 COMPLEX E/M VISIT ADD ON: HCPCS

## 2025-06-05 PROCEDURE — 3074F SYST BP LT 130 MM HG: CPT

## 2025-06-05 PROCEDURE — 3046F HEMOGLOBIN A1C LEVEL >9.0%: CPT

## 2025-06-05 PROCEDURE — 99204 OFFICE O/P NEW MOD 45 MIN: CPT

## 2025-06-05 RX ORDER — METFORMIN HYDROCHLORIDE 500 MG/1
1000 TABLET, EXTENDED RELEASE ORAL
Qty: 120 TABLET | Refills: 5
Start: 2025-06-05

## 2025-06-05 NOTE — PROGRESS NOTES
Chief Complaint  Diabetes and Establish Care    Referred By: Koby Hernandez MD    Subjective          Patient or patient representative verbalized consent for the use of Ambient Listening during the visit with  OSCAR Heiwtt for chart documentation. 6/5/2025  10:08 EDT    Yehuda Weaver presents to [facility] for diabetes medication management    History of Present Illness  The patient presents for evaluation of diabetes. He is accompanied by his son.    He has been managing his diabetes with metformin for approximately 4 months. His A1c level was recorded as 7 in 12/2024, but it has since escalated to over 15%. He reports frequent urination at night, increased hunger, and blurred vision. He also experiences fatigue and sleeps excessively. He has undergone leg surgery on both legs and is scheduled for another procedure. His last eye exam was conducted in 2024 at [facility], where no issues were identified. He reports delayed healing of cuts or scratches. He has no history of gastroparesis or pancreatitis. He does not experience constipation or diarrhea but does report indigestion. He was previously on medication for indigestion, which was later switched to omeprazole. He monitors his blood sugar levels at home 2 to 3 times daily, which have been fluctuating. He consumes sweet drinks, water, and black coffee, and attempts to maintain a diet of 3 meals per day. He recalls having a foot exam years ago and has consulted with a diabetes educator or dietitian during a hospital visit. He has experienced episodes of waking up in a cold sweat. He is currently on Lantus 10 units twice daily and metformin extended release 1000 mg in the morning and at night.    He had a triple bypass in 2007 and bladder cancer in 2004. He has gallstones and is scheduled for a scope procedure next Thursday.    PAST SURGICAL HISTORY:  Triple bypass in 2007  Bladder cancer in  2004  Leg surgery on both legs    FAMILY HISTORY  His grandfather and mother had diabetes, with his grandfather losing both legs and his mother losing her left leg due to the condition.      History of Present Illness    Visit type:  to establish care  Diabetes type:  Type 2  Age at time of dx/Year of dx/Number of years: Approximately 4 months  Family History of Diabetes: Maternal grandfather, mother, son  Current diabetes status/concerns/issues:  Improve glycemic control  Other current health concerns: Seizures, history of bladder cancer  Current Diabetes symptoms:    Polyuria: Yes     Polydipsia: Yes     Polyphagia: Yes     Blurred vision: Yes     Excessive fatigue: Yes    Known Diabetes complications:  Neuropathy: Numbness and Tingling; Location: Lower Extremities and Bilateral  Renal: No current urine microalbumin available and Stage II mild (GFR = 60-89 mL/min)  Eyes: No current eye exam available in record; Location: N/A; Last Eye Exam: one year ago; Location: [facility]  Amputation/Wounds: slow to heal  GI: Reflux and Indigestion  Cardiovascular: Hypertension, Hyperlipidemia, CAD, Peripheral Vascular Disease, and Other: History of CABG  ED: None  Other: None  Hospitalizations/ED/911 secondary to DM?  Yes, 5/14/2025 to 5/16/2025 for [facility]  Hypoglycemia:  None reported at this time  Hypoglycemia Symptoms:  No hypoglycemia at this time  Current Diabetes treatment:  Lantus 10 units twice daily, metformin ER 1000 mg every morning  Prior diabetes treatments:  previously just on Metformin  Using ACEI or ARB: No  Using Statin: Yes, Lipitor 80 mg nightly, Managed by other provider  Blood glucose device:  Meter  Blood glucose monitoring frequency:  2 -3  Blood glucose range/average:  107-350 mg/dL  Glucose Source: Patient Reported  Dietary behavior:  Limits high carb/sweet foods, Avoids sugary drinks, Number of meals each day - 3; Number of snacks each day - occasional  Activity/Exercise:  None  Last Foot  Exam: None  Diabetes Education Hx: None  Social Determinants of Health: None    Past Medical History:   Diagnosis Date    Arterial stent thrombosis     Arthritis     Asthma     Atherosclerosis of native arteries of extremities with intermittent claudication, bilateral legs 01/22/2020    Bladder cancer 2004    CAD (coronary artery disease) 01/22/2020    COPD (chronic obstructive pulmonary disease) 01/22/2020    Coronary artery disease     Diabetes mellitus     Diverticulitis     DVT OF PROXIMAL LOWER LIMB 06/2022    RIGHT LOWER LEG/CHRONIC    GERD (gastroesophageal reflux disease)     Heart attack 2007    History of COVID-19 09/2022    Hx of colonic polyps     Hyperlipidemia 01/22/2020    Hypertension 01/22/2020    Long term (current) use of anticoagulants 08/19/2022    Onychomycosis     PAD (peripheral artery disease)     PVD (peripheral vascular disease)     Seizures     12/2023, 4/2024/ STARTED KEPPRA AFTER LAST SEIZURE    Sleep apnea     RECENT DIAGNOSIS NO MACHINE, STILL NEEDS TO F/U W/PCP    Slow to wake up after anesthesia      Past Surgical History:   Procedure Laterality Date    ANGIOPLASTY Right 06/17/2022    fem/pop    APPENDECTOMY  1970    ARTERIOGRAM LOWER EXTREMITY Left 07/10/2023    Procedure: LEFT LOWER EXTERMITY ANGIOGRAM WITH TIBAL ARTERY ANGIOPLASTY;  Surgeon: Donna Bean Jr., MD;  Location: [facility] 18/19;  Service: Vascular;  Laterality: Left;    ARTERIOGRAM LOWER EXTREMITY WITH ANGIOPLASTY STENT Left 6/10/2024    Procedure: LEFT LOWER EXTREMITY ARTERIOGRAM WITH  ANGIOPLASTY;  Surgeon: Donna Bean Jr., MD;  Location: [facility];  Service: Vascular;  Laterality: Left;    ATHRECTOMY ILIAC, FEMORAL, TIBIAL ARTERY Left 10/20/2022    Procedure: LEFT SUPERFICIAL FEMORAL ARTERY LASER ATHERECTOMY AND COVERED STENT, ANGIOPLASTY OF POPLITEAL AND TIBIAL PERINEAL TRUNK AND PHARMACOLOGIC THROMBOLYSIS;  Surgeon: Donna Bean Jr., MD;  Location: [facility] 18/19;   Service: Vascular;  Laterality: Left;    BACK SURGERY      BLADDER SURGERY  2004    cancer    COLONOSCOPY  07/22/2020    Dr. Castano    CORONARY ARTERY BYPASS GRAFT  2007    Triple coronary bypass    EKOS CATHETER PLACEMENT Right 06/16/2022    Procedure: Right lower extremity arteriogram via left groin approach with placement of thrombolysis infusion catheter;  Surgeon: Donna Bean Jr., MD;  Location: [facility] OR 18/19;  Service: Vascular;  Laterality: Right;    EKOS CATHETER REMOVAL N/A 06/17/2022    Procedure: EKOS CATHETER REMOVAL, RIGHT LOWER EXTREMITY ARTERIOGRAM WITH PERCUTANEOUS TRANSLUMINAL ANGIOPLASTY, ASPIRATION PNEUMBRA THROMBECTOMY, AND IVUS;  Surgeon: Yehuda Salgado MD;  Location: [facility] OR 18/19;  Service: Vascular;  Laterality: N/A;    FEMORAL ARTERY STENT Right 06/17/2022    FEMORAL POPLITEAL BYPASS Left 02/02/2023    Procedure: LEFT FEMORAL BELOW KNEE BYPASS INSITU GREATER SAPHENOUS VEIN;  Surgeon: Donna Bean Jr., MD;  Location: [facility] MAIN OR;  Service: Vascular;  Laterality: Left;    HERNIA REPAIR      LEG THROMBECTOMY/EMBOLECTOMY Right 06/17/2022    tibial artery    NECK SURGERY      PERIPHERAL ARTERIAL STENT GRAFT  06/25/2021    UPPER GASTROINTESTINAL ENDOSCOPY  07/22/2020    Dr. Castano    VASCULAR SURGERY Right 2022     Family History   Problem Relation Age of Onset    Heart failure Mother     Heart disease Mother     Diabetes Mother     Clotting disorder Father     Heart attack Father     Sleep apnea Sister     Clotting disorder Sister     Lung cancer Brother         Unsure of age    Stroke Brother     Cancer Son     Colon cancer Neg Hx     Malig Hyperthermia Neg Hx      Social History     Socioeconomic History    Marital status:    Tobacco Use    Smoking status: Former     Current packs/day: 0.00     Average packs/day: 0.5 packs/day for 54.0 years (27.0 ttl pk-yrs)     Types: Cigarettes     Start date: 02/1969     Quit date: 02/2023     Years  since quittin.3    Smokeless tobacco: Never    Tobacco comments:     QUIT IN 2023   Vaping Use    Vaping status: Never Used   Substance and Sexual Activity    Alcohol use: Not Currently    Drug use: Never    Sexual activity: Defer     Allergies   Allergen Reactions    Penicillins Anaphylaxis       Current Outpatient Medications:     albuterol (PROVENTIL) (2.5 MG/3ML) 0.083% nebulizer solution, Take 2.5 mg by nebulization Every 4 (Four) Hours As Needed for Wheezing., Disp: , Rfl:     albuterol sulfate  (90 Base) MCG/ACT inhaler, Inhale 2 puffs Every 4 (Four) Hours As Needed for Wheezing., Disp: , Rfl:     Alcohol Swabs 70 % pads, Use 1 each 5 (Five) Times a Day for 30 days., Disp: 150 each, Rfl: 0    Aspirin Low Dose 81 MG EC tablet, Take 1 tablet by mouth Daily., Disp: , Rfl:     atorvastatin (LIPITOR) 80 MG tablet, Take 1 tablet by mouth Every Night., Disp: , Rfl:     Blood Glucose Monitoring Suppl (Blood Glucose Monitor System) w/Device kit, Use 1 each Every 3 (Three) Years., Disp: 1 each, Rfl: 0    cetirizine (zyrTEC) 10 MG tablet, Take 1 tablet by mouth Daily., Disp: , Rfl:     Cholecalciferol 25 MCG (1000 UT) tablet, Take 1 tablet by mouth Daily., Disp: , Rfl:     dilTIAZem CD (CARDIZEM CD) 240 MG 24 hr capsule, Take 1 capsule by mouth Daily., Disp: , Rfl:     doxazosin (CARDURA) 4 MG tablet, Take 1 tablet by mouth Every Night., Disp: , Rfl:     Eliquis 5 MG tablet tablet, Take 1 tablet by mouth 2 (Two) Times a Day. PT STATES HOLDING FOR 2 DAYS PRIOR TO SURGERY, Disp: , Rfl:     FeroSul 325 (65 Fe) MG tablet, Take 1 tablet by mouth Daily., Disp: , Rfl:     fluticasone (FLONASE) 50 MCG/ACT nasal spray, Administer 1 spray into the nostril(s) as directed by provider Every Night., Disp: , Rfl:     Fluticasone-Salmeterol (ADVAIR DISKUS IN), Inhale 1 Inhalation Daily As Needed., Disp: , Rfl:     glucose blood test strip, 1 each by Other route 5 (Five) Times a Day for 30 days. Use as instructed, Disp:  "150 each, Rfl: 0    Insulin Glargine (LANTUS SOLOSTAR) 100 UNIT/ML injection pen, Inject 10 Units under the skin into the appropriate area as directed 2 (Two) Times a Day., Disp: 15 mL, Rfl: 3    Insulin Pen Needle 32G X 4 MM misc, Use 1 each 5 (Five) Times a Day for 30 days., Disp: 150 each, Rfl: 0    Lancets 33G misc, Use 1 each Daily for 30 days., Disp: 30 each, Rfl: 0    levETIRAcetam (KEPPRA) 750 MG tablet, Take 1 tablet by mouth Every 12 (Twelve) Hours for 30 days., Disp: 60 tablet, Rfl: 0    metFORMIN ER (GLUCOPHAGE-XR) 500 MG 24 hr tablet, Take 2 tablets by mouth Daily With Breakfast., Disp: 120 tablet, Rfl: 5    metoprolol succinate XL (TOPROL-XL) 100 MG 24 hr tablet, Take 0.5 tablets by mouth Daily., Disp: , Rfl:     montelukast (SINGULAIR) 10 MG tablet, Take 1 tablet by mouth Daily., Disp: , Rfl:     multivitamin with minerals (One-A-Day Mens 50+ Advantage) tablet tablet, Take 1 tablet by mouth Daily. HOLDING FOR DOS, Disp: , Rfl:     Omega-3 Fatty Acids (fish oil) 1200 MG capsule capsule, Take 1 capsule by mouth Daily. HOLDING FOR DOS, Disp: , Rfl:     omeprazole (priLOSEC) 40 MG capsule, Take 1 capsule by mouth Daily., Disp: , Rfl:     pyridoxine (VITAMIN B-6) 25 MG tablet, Take 1 tablet by mouth Daily., Disp: , Rfl:     Objective     Vitals:    06/05/25 0955   BP: 107/64   BP Location: Left arm   Patient Position: Sitting   Cuff Size: Adult   Pulse: 80   Temp: 98.1 °F (36.7 °C)   TempSrc: Temporal   SpO2: 99%   Weight: 80.1 kg (176 lb 8 oz)   Height: 170.2 cm (67\")     Body mass index is 27.64 kg/m².    Physical Exam  Constitutional:       Appearance: Normal appearance.      Comments: Overweight (BMI 25 - 29.9) Pt Current BMI = 27.64      HENT:      Head: Normocephalic and atraumatic.      Right Ear: External ear normal.      Left Ear: External ear normal.      Nose: Nose normal.   Eyes:      Extraocular Movements: Extraocular movements intact.      Conjunctiva/sclera: Conjunctivae normal.   Pulmonary: "      Effort: Pulmonary effort is normal.   Musculoskeletal:         General: Normal range of motion.      Cervical back: Normal range of motion.   Skin:     General: Skin is warm and dry.   Neurological:      General: No focal deficit present.      Mental Status: He is alert and oriented to person, place, and time. Mental status is at baseline.   Psychiatric:         Mood and Affect: Mood normal.         Behavior: Behavior normal.         Thought Content: Thought content normal.         Judgment: Judgment normal.         Result Review :   The following data was reviewed by: OSCAR Hewitt on 06/05/2025:    Most Recent A1C          6/5/2025    10:00   HGBA1C Most Recent   Hemoglobin A1C 11.7        A1C Last 3 Results          5/14/2025    12:00 6/5/2025    10:00   HGBA1C Last 3 Results   Hemoglobin A1C 15.50  11.7      A1c collected in the office today is 11.7%, indicating Uncontrolled Type II diabetes.  This result is down from the prior result of 15.5% collected on 5/14/2025    Glucose   Date Value Ref Range Status   05/16/2025 278 (H) 70 - 99 mg/dL Final     Comment:     Serial Number: [id number]:  [id number]     Creatinine   Date Value Ref Range Status   05/16/2025 1.03 0.76 - 1.27 mg/dL Final   05/15/2025 0.88 0.76 - 1.27 mg/dL Final     eGFR   Date Value Ref Range Status   05/16/2025 78.6 >60.0 mL/min/1.73 Final   05/15/2025 93.1 >60.0 mL/min/1.73 Final     Labs collected on 5/16/2025 show Stage II mild (GFR = 60-89mL/min)    Creatinine, Urine   Date Value Ref Range Status   05/14/2025 53.6 mg/dL Final           Diagnoses and all orders for this visit:    1. Type 2 diabetes mellitus with hyperglycemia, with long-term current use of insulin (Primary)  -     POC Glycosylated Hemoglobin (Hb A1C)  -     metFORMIN ER (GLUCOPHAGE-XR) 500 MG 24 hr tablet; Take 2 tablets by mouth Daily With Breakfast.  Dispense: 120 tablet; Refill: 5    2. Type 2 diabetes mellitus with stage 2 chronic kidney disease, with  long-term current use of insulin    3. Overweight with body mass index (BMI) of 27 to 27.9 in adult        Assessment & Plan  1. Diabetes mellitus.  - His A1c level has shown a significant decrease from 15.5 on 05/14/2025 to 11.7% currently, indicating an improvement in his glycemic control.  - The addition of insulin to his treatment regimen has been beneficial. His average blood glucose level has also decreased considerably.  - He is advised to monitor his blood glucose levels during episodes of cold sweats at night. He is encouraged to maintain a food log to identify any dietary factors contributing to elevated blood glucose levels.  - He is also advised to continue his insulin therapy to manage his blood glucose levels effectively. He is instructed to keep regular ginger ale or Sprite on hand to manage potential hypoglycemic episodes. No alterations to his current medication regimen are deemed necessary at this time. If hypoglycemia is detected, adjustments to his insulin dosage may be considered, specifically reducing the nighttime dose and increasing the daytime dose.    Follow-up: The patient is scheduled for a follow-up visit on 07/03/2025 at 8:40 AM.    The patient will monitor his blood glucose levels 2 -3 times daily.  If he develops problematic hyperglycemia or hypoglycemia or adverse drug reactions, he will contact the office for further instructions.        Follow Up     Return in about 4 weeks (around 7/3/2025) for Medication Management.    Patient was given instructions and counseling regarding his condition or for health maintenance advice. Please see specific information pulled into the AVS if appropriate.     Ronnell Ayala, OSCAR  06/05/2025    Dictated Utilizing Dragon Dictation.  Please note that portions of this note were completed with a voice recognition program.  Part of this note may be an electronic transcription/translation of spoken language to printed text using the Dragon Dictation  System.

## 2025-06-08 RX ORDER — LEVETIRACETAM 750 MG/1
750 TABLET ORAL EVERY 12 HOURS
Qty: 60 TABLET | Refills: 0 | Status: CANCELLED | OUTPATIENT
Start: 2025-06-08 | End: 2025-07-08

## 2025-06-11 ENCOUNTER — TRANSCRIBE ORDERS (OUTPATIENT)
Dept: ADMINISTRATIVE | Facility: HOSPITAL | Age: 70
End: 2025-06-11
Payer: MEDICARE

## 2025-06-11 ENCOUNTER — TRANSCRIBE ORDERS (OUTPATIENT)
Age: 70
End: 2025-06-11
Payer: MEDICARE

## 2025-06-11 DIAGNOSIS — Z87.891 PERSONAL HISTORY OF NICOTINE DEPENDENCE: Primary | ICD-10-CM

## 2025-06-11 DIAGNOSIS — I73.9 PAD (PERIPHERAL ARTERY DISEASE): Primary | ICD-10-CM

## 2025-06-11 DIAGNOSIS — I70.213 ATHEROSCLEROSIS OF NATIVE ARTERIES OF EXTREMITIES WITH INTERMITTENT CLAUDICATION, BILATERAL LEGS: ICD-10-CM

## 2025-06-17 ENCOUNTER — TELEPHONE (OUTPATIENT)
Dept: GASTROENTEROLOGY | Facility: CLINIC | Age: 70
End: 2025-06-17
Payer: MEDICARE

## 2025-06-17 NOTE — TELEPHONE ENCOUNTER
6/17/2025    Dear ,     Patient: Yehuda Weaver   YOB: 1955        This patient is waiting to have a Colonoscopy and/or Esophagogastroduodenoscopy which I will perform at [facility] on 8/14/2025.     Our records indicate this patient is currently taking  Eliquis. This procedure requires the patient to suspend their anticoagulant medication prior to surgery.     Please respond to this request noting your recommendations. You may contact our office at [phone] Option 2 with any questions. I appreciate your prompt response in this matter.     Please return this form to our office no later than two weeks prior to the procedure date listed above. Please return form to [phone]. Please inform our office if the patient requires additional follow-up from your office prior to scheduled procedure date.     ____ I approve my patient to stop taking their Anticoagulant Therapy medication  2  days prior to the scheduled procedure.    ____ I do NOT approve my patient to stop taking their Anticoagulant Therapy medication at this time.      Please specify clearance expiration date:_____________________________    Approving physician name (please print):     _____________________________________________    Approving physician signature:     ________________________________    Date:________________        Sincerely,  [facility]   Gastroenterology -

## 2025-06-18 NOTE — TELEPHONE ENCOUNTER
Eliquis clearance received via fax from Dr. Bean, dated 06.17.25.  Indexed into chart.    Attempted to contact patient, left detailed voicemail message ([email or web address] on file) advising to hold Eliquis on 08.12 - 08.13.25 for procedure date 08.14.25.    Asked patient to return call top confirm receipt of instructions     Provided office contact information.

## 2025-06-24 ENCOUNTER — TELEPHONE (OUTPATIENT)
Dept: GASTROENTEROLOGY | Facility: CLINIC | Age: 70
End: 2025-06-24
Payer: MEDICARE

## 2025-06-24 NOTE — TELEPHONE ENCOUNTER
Procedure: Colonoscopy and/or EGD     Med Directive: Eliquis     PMH: CAD s/p CABG, HTN, HLD     Last Seen: 03/14/2025

## 2025-06-24 NOTE — TELEPHONE ENCOUNTER
2025    Dear ,      Patient Name: Yehuda Weaver  : 1955      This patient is waiting to have a Colonoscopy and/or Esophagogastroduodenoscopy which I will perform at [facility] on 2025. Please respond to this request noting your recommendations regarding clearance from a Cardiac  standpoint.  You may contact our office at [phone] Option 2 with any questions. I appreciate your prompt response in this matter. Please return this form to our office as soon as possible to [phone].    ____ I approve my patient from a Cardiac  standpoint    ____ I do NOT approve my patient from a Cardiac  standpoint at this time    Please inform our office if the patient requires additional follow-up from your office prior to scheduled procedure date.      Please specify clearance expiration date:____________________________________      Approving physician name (please print): _____________________________________________      Approving physician signature: ________________________________ Date:________________  Sincerely,  [facility] - Gastroenterology   Dr. Castano          Please fax approval or denial to our office as soon as possible.

## 2025-06-29 NOTE — PROGRESS NOTES
Chief Complaint  Diabetes    Referred By: Koby Hernandez MD    Subjective          Patient or patient representative verbalized consent for the use of Ambient Listening during the visit with  OSCAR Hewitt for chart documentation. 7/3/2025  08:34 EDT    Yehuda Weaver presents to [facility] for diabetes medication management    History of Present Illness    History of Present Illness  The patient presents for evaluation of diabetes.    He reports feeling fatigued and has been monitoring his blood sugar levels, which have shown improvement. Blood sugar readings have not fallen below 70, indicating no hypoglycemic episodes. Morning blood sugar levels typically range from 96 to 186 mg/dL. He uses a sensor to monitor his blood sugar, but it has malfunctioned, requiring replacement three times. He is considering the use of a continuous glucose monitor. He also reports frequent urination at night.       Visit type:  follow-up  Diabetes type:  Type 2  Current diabetes status/concerns/issues:  No concerns  Other health concerns: Seizures, history of bladder cancer  Current Diabetes symptoms:    Polyuria: Yes     Polydipsia: Yes     Polyphagia: Yes     Blurred vision: Yes     Excessive fatigue: Yes    Known Diabetes complications:  Neuropathy: Numbness and Tingling; Location: Lower Extremities and Bilateral  Renal: No current urine microalbumin available and Stage II mild (GFR = 60-89 mL/min)  Eyes: No current eye exam available in record; Location: N/A  Amputation/Wounds: Delayed healing  GI: Reflux and Indigestion  Cardiovascular: Hypertension, Hyperlipidemia, CAD, Peripheral Vascular Disease, and Other: History of CABG  ED: None  Other: None  Hypoglycemia:  None reported at this time  Hypoglycemia Symptoms:  No hypoglycemia at this time  Current diabetes treatment:  Lantus 10 units twice daily, metformin ER 1000 mg every morning,  Blood glucose device:  Meter  Blood glucose  monitoring frequency:  2 -3  Blood glucose range/average:   mg/dL fasting  Glucose Source: Patient Reported  Diet:  Limits high carb/sweet foods, Avoids sugary drinks, Number of meals each day - 3; Number of snacks each day - occasional  Activity/Exercise:  None    Past Medical History:   Diagnosis Date    Arterial stent thrombosis     Arthritis     Asthma     Atherosclerosis of native arteries of extremities with intermittent claudication, bilateral legs 01/22/2020    Bladder cancer 2004    CAD (coronary artery disease) 01/22/2020    COPD (chronic obstructive pulmonary disease) 01/22/2020    Coronary artery disease     Diabetes mellitus     Diverticulitis     DVT OF PROXIMAL LOWER LIMB 06/2022    RIGHT LOWER LEG/CHRONIC    GERD (gastroesophageal reflux disease)     Heart attack 2007    History of COVID-19 09/2022    Hx of colonic polyps     Hyperlipidemia 01/22/2020    Hypertension 01/22/2020    Long term (current) use of anticoagulants 08/19/2022    Onychomycosis     PAD (peripheral artery disease)     PVD (peripheral vascular disease)     Seizures     12/2023, 4/2024/ STARTED KEPPRA AFTER LAST SEIZURE    Sleep apnea     RECENT DIAGNOSIS NO MACHINE, STILL NEEDS TO F/U W/PCP    Slow to wake up after anesthesia      Past Surgical History:   Procedure Laterality Date    ANGIOPLASTY Right 06/17/2022    fem/pop    APPENDECTOMY  1970    ARTERIOGRAM LOWER EXTREMITY Left 07/10/2023    Procedure: LEFT LOWER EXTERMITY ANGIOGRAM WITH TIBAL ARTERY ANGIOPLASTY;  Surgeon: Donna Bean Jr., MD;  Location: [facility] OR 18/19;  Service: Vascular;  Laterality: Left;    ARTERIOGRAM LOWER EXTREMITY WITH ANGIOPLASTY STENT Left 6/10/2024    Procedure: LEFT LOWER EXTREMITY ARTERIOGRAM WITH  ANGIOPLASTY;  Surgeon: Donna Bean Jr., MD;  Location: [facility] OR;  Service: Vascular;  Laterality: Left;    ATHRECTOMY ILIAC, FEMORAL, TIBIAL ARTERY Left 10/20/2022    Procedure: LEFT SUPERFICIAL FEMORAL ARTERY  LASER ATHERECTOMY AND COVERED STENT, ANGIOPLASTY OF POPLITEAL AND TIBIAL PERINEAL TRUNK AND PHARMACOLOGIC THROMBOLYSIS;  Surgeon: Donna Bean Jr., MD;  Location: [facility] OR 18/19;  Service: Vascular;  Laterality: Left;    BACK SURGERY      BLADDER SURGERY  2004    cancer    COLONOSCOPY  07/22/2020    Dr. Castano    CORONARY ARTERY BYPASS GRAFT  2007    Triple coronary bypass    EKOS CATHETER PLACEMENT Right 06/16/2022    Procedure: Right lower extremity arteriogram via left groin approach with placement of thrombolysis infusion catheter;  Surgeon: Donna Bean Jr., MD;  Location: [facility] OR 18/19;  Service: Vascular;  Laterality: Right;    EKOS CATHETER REMOVAL N/A 06/17/2022    Procedure: EKOS CATHETER REMOVAL, RIGHT LOWER EXTREMITY ARTERIOGRAM WITH PERCUTANEOUS TRANSLUMINAL ANGIOPLASTY, ASPIRATION PNEUMBRA THROMBECTOMY, AND IVUS;  Surgeon: eYhuda Salgado MD;  Location: [facility] 18/19;  Service: Vascular;  Laterality: N/A;    FEMORAL ARTERY STENT Right 06/17/2022    FEMORAL POPLITEAL BYPASS Left 02/02/2023    Procedure: LEFT FEMORAL BELOW KNEE BYPASS INSITU GREATER SAPHENOUS VEIN;  Surgeon: Donna Bean Jr., MD;  [facility];  Service: Vascular;  Laterality: Left;    HERNIA REPAIR      LEG THROMBECTOMY/EMBOLECTOMY Right 06/17/2022    tibial artery    NECK SURGERY      PERIPHERAL ARTERIAL STENT GRAFT  06/25/2021    UPPER GASTROINTESTINAL ENDOSCOPY  07/22/2020    Dr. Castano    VASCULAR SURGERY Right 2022     Family History   Problem Relation Age of Onset    Heart failure Mother     Heart disease Mother     Diabetes Mother     Clotting disorder Father     Heart attack Father     Sleep apnea Sister     Clotting disorder Sister     Lung cancer Brother         Unsure of age    Stroke Brother     Cancer Son     Colon cancer Neg Hx     Malig Hyperthermia Neg Hx      Social History     Socioeconomic History    Marital status:    Tobacco Use     Smoking status: Former     Current packs/day: 0.00     Average packs/day: 0.5 packs/day for 54.0 years (27.0 ttl pk-yrs)     Types: Cigarettes     Start date: 1969     Quit date: 2023     Years since quittin.4    Smokeless tobacco: Never    Tobacco comments:     QUIT IN 2023   Vaping Use    Vaping status: Never Used   Substance and Sexual Activity    Alcohol use: Not Currently    Drug use: Never    Sexual activity: Defer     Allergies   Allergen Reactions    Penicillins Anaphylaxis       Current Outpatient Medications:     albuterol (PROVENTIL) (2.5 MG/3ML) 0.083% nebulizer solution, Take 2.5 mg by nebulization Every 4 (Four) Hours As Needed for Wheezing., Disp: , Rfl:     albuterol sulfate  (90 Base) MCG/ACT inhaler, Inhale 2 puffs Every 4 (Four) Hours As Needed for Wheezing., Disp: , Rfl:     Aspirin Low Dose 81 MG EC tablet, Take 1 tablet by mouth Daily., Disp: , Rfl:     atorvastatin (LIPITOR) 80 MG tablet, Take 1 tablet by mouth Every Night., Disp: , Rfl:     Blood Glucose Monitoring Suppl (Blood Glucose Monitor System) w/Device kit, Use 1 each Every 3 (Three) Years., Disp: 1 each, Rfl: 0    cetirizine (zyrTEC) 10 MG tablet, Take 1 tablet by mouth Daily., Disp: , Rfl:     Cholecalciferol 25 MCG (1000 UT) tablet, Take 1 tablet by mouth Daily., Disp: , Rfl:     dilTIAZem CD (CARDIZEM CD) 240 MG 24 hr capsule, Take 1 capsule by mouth Daily., Disp: , Rfl:     doxazosin (CARDURA) 4 MG tablet, Take 1 tablet by mouth Every Night., Disp: , Rfl:     Eliquis 5 MG tablet tablet, Take 1 tablet by mouth 2 (Two) Times a Day. PT STATES HOLDING FOR 2 DAYS PRIOR TO SURGERY, Disp: , Rfl:     FeroSul 325 (65 Fe) MG tablet, Take 1 tablet by mouth Daily., Disp: , Rfl:     fluticasone (FLONASE) 50 MCG/ACT nasal spray, Administer 1 spray into the nostril(s) as directed by provider Every Night., Disp: , Rfl:     Fluticasone-Salmeterol (ADVAIR DISKUS IN), Inhale 1 Inhalation Daily As Needed., Disp: , Rfl:      "Insulin Glargine (LANTUS SOLOSTAR) 100 UNIT/ML injection pen, Inject 10 Units under the skin into the appropriate area as directed 2 (Two) Times a Day., Disp: 15 mL, Rfl: 3    levETIRAcetam (KEPPRA) 750 MG tablet, Take 1 tablet by mouth Every 12 (Twelve) Hours for 30 days., Disp: 60 tablet, Rfl: 0    metFORMIN ER (GLUCOPHAGE-XR) 500 MG 24 hr tablet, Take 2 tablets by mouth Daily With Breakfast., Disp: 120 tablet, Rfl: 5    metoprolol succinate XL (TOPROL-XL) 100 MG 24 hr tablet, Take 0.5 tablets by mouth Daily., Disp: , Rfl:     montelukast (SINGULAIR) 10 MG tablet, Take 1 tablet by mouth Daily., Disp: , Rfl:     multivitamin with minerals (One-A-Day Mens 50+ Advantage) tablet tablet, Take 1 tablet by mouth Daily. HOLDING FOR DOS, Disp: , Rfl:     omeprazole (priLOSEC) 40 MG capsule, Take 1 capsule by mouth Daily., Disp: , Rfl:     pyridoxine (VITAMIN B-6) 25 MG tablet, Take 1 tablet by mouth Daily., Disp: , Rfl:     Omega-3 Fatty Acids (fish oil) 1200 MG capsule capsule, Take 1 capsule by mouth Daily. HOLDING FOR DOS (Patient not taking: Reported on 7/3/2025), Disp: , Rfl:     Objective     Vitals:    07/03/25 0808   BP: 119/72   BP Location: Left arm   Patient Position: Sitting   Cuff Size: Adult   Pulse: 60   SpO2: 96%   Weight: 82.6 kg (182 lb)   Height: 170.2 cm (67\")     Body mass index is 28.51 kg/m².    Physical Exam  Constitutional:       Appearance: Normal appearance.      Comments: Overweight (BMI 25 - 29.9) Pt Current BMI = 28.51      HENT:      Head: Normocephalic and atraumatic.      Right Ear: External ear normal.      Left Ear: External ear normal.      Nose: Nose normal.   Eyes:      Extraocular Movements: Extraocular movements intact.      Conjunctiva/sclera: Conjunctivae normal.   Pulmonary:      Effort: Pulmonary effort is normal.   Musculoskeletal:         General: Normal range of motion.      Cervical back: Normal range of motion.   Skin:     General: Skin is warm and dry.   Neurological:      " General: No focal deficit present.      Mental Status: He is alert and oriented to person, place, and time. Mental status is at baseline.   Psychiatric:         Mood and Affect: Mood normal.         Behavior: Behavior normal.         Thought Content: Thought content normal.         Judgment: Judgment normal.         Result Review :   The following data was reviewed by: OSCAR Hewitt on 07/03/2025:    Most Recent A1C          7/3/2025    08:20   HGBA1C Most Recent   Hemoglobin A1C 8.6        A1C Last 3 Results          5/14/2025    12:00 6/5/2025    10:00 7/3/2025    08:20   HGBA1C Last 3 Results   Hemoglobin A1C 15.50  11.7  8.6      A1c collected in the office today is 8.6%, indicating Uncontrolled Type II diabetes.  This result is down from the prior result of 11.7% collected on 6/5/2025    Glucose   Date Value Ref Range Status   05/16/2025 278 (H) 70 - 99 mg/dL Final     Comment:     Serial Number: [id number]:  [id number]     Creatinine   Date Value Ref Range Status   05/16/2025 1.03 0.76 - 1.27 mg/dL Final   05/15/2025 0.88 0.76 - 1.27 mg/dL Final     eGFR   Date Value Ref Range Status   05/16/2025 78.6 >60.0 mL/min/1.73 Final   05/15/2025 93.1 >60.0 mL/min/1.73 Final     Labs collected on 5/16/2025 show Stage II mild (GFR = 60-89mL/min)    Creatinine, Urine   Date Value Ref Range Status   05/14/2025 53.6 mg/dL Final           Diagnoses and all orders for this visit:    1. Type 2 diabetes mellitus with hyperglycemia, with long-term current use of insulin (Primary)  -     POC Glycosylated Hemoglobin (Hb A1C)  -     Insulin Glargine (LANTUS SOLOSTAR) 100 UNIT/ML injection pen; Inject 10 Units under the skin into the appropriate area as directed 2 (Two) Times a Day.  Dispense: 15 mL; Refill: 3    2. Type 2 diabetes mellitus with stage 2 chronic kidney disease, with long-term current use of insulin    3. Overweight with body mass index (BMI) of 27 to 27.9 in adult        Assessment & Plan  1. Diabetes  Mellitus: Resolving.  - Blood sugar levels have shown improvement, with some readings in the 90s.  - A1c levels have significantly decreased from 15.5% on 05/14/2025 to 11.7% on 06/05/2025, and further down to 8.6% today.  - Discussed the importance of monitoring blood sugar levels and maintaining a log.  - Current medication regimen will be maintained without any changes.    Follow-up: The patient will follow up in 3 months.    The patient will monitor his blood glucose levels 1 time daily fasting.  If he develops problematic hyperglycemia or hypoglycemia or adverse drug reactions, he will contact the office for further instructions.        Follow Up     Return in about 3 months (around 10/3/2025) for Medication Management.    Patient was given instructions and counseling regarding his condition or for health maintenance advice. Please see specific information pulled into the AVS if appropriate.     Ronnell Ayala, APRN  07/03/2025    Dictated Utilizing Dragon Dictation.  Please note that portions of this note were completed with a voice recognition program.  Part of this note may be an electronic transcription/translation of spoken language to printed text using the Dragon Dictation System.

## 2025-07-03 ENCOUNTER — OFFICE VISIT (OUTPATIENT)
Age: 70
End: 2025-07-03
Payer: MEDICARE

## 2025-07-03 VITALS
OXYGEN SATURATION: 96 % | BODY MASS INDEX: 28.56 KG/M2 | HEIGHT: 67 IN | SYSTOLIC BLOOD PRESSURE: 119 MMHG | WEIGHT: 182 LBS | DIASTOLIC BLOOD PRESSURE: 72 MMHG | HEART RATE: 60 BPM

## 2025-07-03 DIAGNOSIS — Z79.4 TYPE 2 DIABETES MELLITUS WITH STAGE 2 CHRONIC KIDNEY DISEASE, WITH LONG-TERM CURRENT USE OF INSULIN: ICD-10-CM

## 2025-07-03 DIAGNOSIS — N18.2 TYPE 2 DIABETES MELLITUS WITH STAGE 2 CHRONIC KIDNEY DISEASE, WITH LONG-TERM CURRENT USE OF INSULIN: ICD-10-CM

## 2025-07-03 DIAGNOSIS — E66.3 OVERWEIGHT WITH BODY MASS INDEX (BMI) OF 27 TO 27.9 IN ADULT: ICD-10-CM

## 2025-07-03 DIAGNOSIS — Z79.4 TYPE 2 DIABETES MELLITUS WITH HYPERGLYCEMIA, WITH LONG-TERM CURRENT USE OF INSULIN: Primary | ICD-10-CM

## 2025-07-03 DIAGNOSIS — E11.22 TYPE 2 DIABETES MELLITUS WITH STAGE 2 CHRONIC KIDNEY DISEASE, WITH LONG-TERM CURRENT USE OF INSULIN: ICD-10-CM

## 2025-07-03 DIAGNOSIS — E11.65 TYPE 2 DIABETES MELLITUS WITH HYPERGLYCEMIA, WITH LONG-TERM CURRENT USE OF INSULIN: Primary | ICD-10-CM

## 2025-07-03 LAB
EXPIRATION DATE: ABNORMAL
HBA1C MFR BLD: 8.6 % (ref 4.5–5.7)
Lab: ABNORMAL

## 2025-08-06 ENCOUNTER — HOSPITAL ENCOUNTER (OUTPATIENT)
Facility: HOSPITAL | Age: 70
Discharge: HOME | End: 2025-08-06
Payer: MEDICARE

## 2025-08-06 ENCOUNTER — OFFICE VISIT (OUTPATIENT)
Age: 70
End: 2025-08-06
Payer: MEDICARE

## 2025-08-06 VITALS
BODY MASS INDEX: 28.56 KG/M2 | HEIGHT: 67 IN | DIASTOLIC BLOOD PRESSURE: 80 MMHG | WEIGHT: 182 LBS | SYSTOLIC BLOOD PRESSURE: 144 MMHG | RESPIRATION RATE: 17 BRPM

## 2025-08-06 DIAGNOSIS — T82.398D: ICD-10-CM

## 2025-08-06 DIAGNOSIS — I70.213 ATHEROSCLEROSIS OF NATIVE ARTERIES OF EXTREMITIES WITH INTERMITTENT CLAUDICATION, BILATERAL LEGS: Primary | ICD-10-CM

## 2025-08-06 DIAGNOSIS — I70.213 ATHEROSCLEROSIS OF NATIVE ARTERIES OF EXTREMITIES WITH INTERMITTENT CLAUDICATION, BILATERAL LEGS: ICD-10-CM

## 2025-08-06 DIAGNOSIS — I25.10 CORONARY ARTERY DISEASE INVOLVING NATIVE CORONARY ARTERY OF NATIVE HEART WITHOUT ANGINA PECTORIS: ICD-10-CM

## 2025-08-06 DIAGNOSIS — I73.9 PAD (PERIPHERAL ARTERY DISEASE): ICD-10-CM

## 2025-08-06 DIAGNOSIS — E78.2 MIXED HYPERLIPIDEMIA: ICD-10-CM

## 2025-08-06 DIAGNOSIS — J44.9 CHRONIC OBSTRUCTIVE PULMONARY DISEASE, UNSPECIFIED COPD TYPE: ICD-10-CM

## 2025-08-06 LAB
BH CV GRAFT 1 - DISTAL ANASTAMOSIS PSV-LEFT: 19 CM/S
BH CV GRAFT 1 - DISTAL GRAFT PSV-LEFT: 20 CM/S
BH CV GRAFT 1 - INFLOW ARTERY PSV- LEFT: 113 CM/S
BH CV GRAFT 1 - MID DISTAL GRAFT PSV-LEFT: 25 CM/S
BH CV GRAFT 1 - MID GRAFT PSV-LEFT: 47 CM/S
BH CV GRAFT 1 - OUTFLOW ARTERY PSV-LEFT: 85 CM/S
BH CV GRAFT 1 - PROX GRAFT PSV-LEFT: 77 CM/S
BH CV GRAFT 1 - PROX MID GRAFT PSV-LEFT: 30 CM/S
BH CV GRAFT 1 - PROXIMAL ANASTAMOSIS PSV-LEFT: 173 CM/S
BH CV GRAFT 1- DISTAL ANASTAMOSIS EDV-LEFT: 3 CM/S
BH CV GRAFT 1- OUTFLOW ARTERY EDV-LEFT: 14 CM/S
BH CV GRAFT 1- PROXIMAL ANASTAMOSIS EDV-LEFT: 9 CM/S
BH CV LOWER ARTERIAL LEFT ABI RATIO: 0.62
BH CV LOWER ARTERIAL LEFT DORSALIS PEDIS SYS MAX: 68
BH CV LOWER ARTERIAL LEFT POST TIBIAL SYS MAX: 98
BH CV LOWER ARTERIAL RIGHT ABI RATIO: 0.51
BH CV LOWER ARTERIAL RIGHT DORSALIS PEDIS SYS MAX: 72
BH CV LOWER ARTERIAL RIGHT POST TIBIAL SYS MAX: 80
UPPER ARTERIAL LEFT ARM BRACHIAL SYS MAX: 158
UPPER ARTERIAL RIGHT ARM BRACHIAL SYS MAX: 144

## 2025-08-06 PROCEDURE — 93922 UPR/L XTREMITY ART 2 LEVELS: CPT

## 2025-08-06 PROCEDURE — 93926 LOWER EXTREMITY STUDY: CPT

## 2025-08-13 ENCOUNTER — ANESTHESIA EVENT (OUTPATIENT)
Dept: GASTROENTEROLOGY | Facility: HOSPITAL | Age: 70
End: 2025-08-13
Payer: MEDICARE

## 2025-08-14 ENCOUNTER — HOSPITAL ENCOUNTER (OUTPATIENT)
Facility: HOSPITAL | Age: 70
Setting detail: HOSPITAL OUTPATIENT SURGERY
Discharge: HOME | End: 2025-08-14
Attending: INTERNAL MEDICINE | Admitting: INTERNAL MEDICINE
Payer: MEDICARE

## 2025-08-14 ENCOUNTER — ANESTHESIA (OUTPATIENT)
Dept: GASTROENTEROLOGY | Facility: HOSPITAL | Age: 70
End: 2025-08-14
Payer: MEDICARE

## 2025-08-14 VITALS
DIASTOLIC BLOOD PRESSURE: 78 MMHG | TEMPERATURE: 98 F | HEART RATE: 73 BPM | HEIGHT: 67 IN | WEIGHT: 178.57 LBS | OXYGEN SATURATION: 92 % | BODY MASS INDEX: 28.03 KG/M2 | SYSTOLIC BLOOD PRESSURE: 154 MMHG | RESPIRATION RATE: 19 BRPM

## 2025-08-14 DIAGNOSIS — K57.92 DIVERTICULITIS: ICD-10-CM

## 2025-08-14 DIAGNOSIS — Z12.11 ENCOUNTER FOR SCREENING FOR MALIGNANT NEOPLASM OF COLON: ICD-10-CM

## 2025-08-14 DIAGNOSIS — K63.5 POLYP OF DESCENDING COLON, UNSPECIFIED TYPE: ICD-10-CM

## 2025-08-14 LAB — GLUCOSE BLDC GLUCOMTR-MCNC: 125 MG/DL (ref 70–99)

## 2025-08-14 PROCEDURE — 88305 TISSUE EXAM BY PATHOLOGIST: CPT | Performed by: INTERNAL MEDICINE

## 2025-08-14 PROCEDURE — 82948 REAGENT STRIP/BLOOD GLUCOSE: CPT | Performed by: NURSE ANESTHETIST, CERTIFIED REGISTERED

## 2025-08-14 PROCEDURE — 25010000002 PROPOFOL 10 MG/ML EMULSION: Performed by: NURSE ANESTHETIST, CERTIFIED REGISTERED

## 2025-08-14 PROCEDURE — 45385 COLONOSCOPY W/LESION REMOVAL: CPT | Performed by: INTERNAL MEDICINE

## 2025-08-14 PROCEDURE — 25810000003 LACTATED RINGERS PER 1000 ML: Performed by: NURSE ANESTHETIST, CERTIFIED REGISTERED

## 2025-08-14 RX ORDER — PROPOFOL 10 MG/ML
VIAL (ML) INTRAVENOUS AS NEEDED
Status: DISCONTINUED | OUTPATIENT
Start: 2025-08-14 | End: 2025-08-14 | Stop reason: SURG

## 2025-08-14 RX ORDER — SODIUM CHLORIDE, SODIUM LACTATE, POTASSIUM CHLORIDE, CALCIUM CHLORIDE 600; 310; 30; 20 MG/100ML; MG/100ML; MG/100ML; MG/100ML
30 INJECTION, SOLUTION INTRAVENOUS CONTINUOUS
Status: DISCONTINUED | OUTPATIENT
Start: 2025-08-14 | End: 2025-08-14 | Stop reason: HOSPADM

## 2025-08-14 RX ORDER — PROPOFOL 10 MG/ML
VIAL (ML) INTRAVENOUS AS NEEDED
Status: DISCONTINUED | OUTPATIENT
Start: 2025-08-14 | End: 2025-08-14

## 2025-08-14 RX ADMIN — SODIUM CHLORIDE, POTASSIUM CHLORIDE, SODIUM LACTATE AND CALCIUM CHLORIDE 30 ML/HR: 600; 310; 30; 20 INJECTION, SOLUTION INTRAVENOUS at 07:57

## 2025-08-14 RX ADMIN — PROPOFOL 175 MCG/KG/MIN: 10 INJECTION, EMULSION INTRAVENOUS at 09:08

## 2025-08-14 RX ADMIN — PROPOFOL 100 MG: 10 INJECTION, EMULSION INTRAVENOUS at 09:09

## 2025-08-15 LAB
CYTO UR: NORMAL
LAB AP CASE REPORT: NORMAL
LAB AP CLINICAL INFORMATION: NORMAL
PATH REPORT.FINAL DX SPEC: NORMAL
PATH REPORT.GROSS SPEC: NORMAL

## (undated) DEVICE — RADIFOCUS GLIDEWIRE ADVANTAGE GUIDEWIRE: Brand: GLIDEWIRE ADVANTAGE

## (undated) DEVICE — PK AAA 40

## (undated) DEVICE — PINNACLE R/O II INTRODUCER SHEATH WITH RADIOPAQUE MARKER: Brand: PINNACLE

## (undated) DEVICE — CANSTR COL ENGINE FOR INDIGO SYS

## (undated) DEVICE — Device

## (undated) DEVICE — DEFENDO AIR WATER SUCTION AND BIOPSY VALVE KIT: Brand: DEFENDO AIR/WATER/SUCTION AND BIOPSY VALVE

## (undated) DEVICE — SYRINGE KIT,PACKAGED,,150FT,MK 7(ANGIO-ARTERION, 150ML SYR KIT W/QFT,MC)(60729385): Brand: MEDRAD® MARK 7 ARTERION DISPOSABLE SYRINGE 150 ML WITH QUICK FILL TUBE

## (undated) DEVICE — SOL NACL 0.9PCT 1000ML

## (undated) DEVICE — STARCLOSE SE VASCULAR CLOSURE SYSTEM: Brand: STARCLOSE SE

## (undated) DEVICE — CATH GUIDE SOFTVU FLUSH HT STR .035 5F 90CM

## (undated) DEVICE — CATH ANGIO TRCN NB BCN .038 5F 65CM RIM

## (undated) DEVICE — TOTAL TRAY, 16FR 10ML SIL FOLEY, URN: Brand: MEDLINE

## (undated) DEVICE — NAVICROSS SUPPORT CATHETER: Brand: NAVICROSS

## (undated) DEVICE — DESTINATION RENAL GUIDING SHEATH: Brand: DESTINATION

## (undated) DEVICE — CATH EKOSONIC MACH4 DS 5.2F 50X135CM

## (undated) DEVICE — RADIFOCUS GLIDEWIRE: Brand: GLIDEWIRE

## (undated) DEVICE — CVR PROB 96IN LF STRL

## (undated) DEVICE — SUT SILK 3/0 SH CR5 18IN C0135

## (undated) DEVICE — WIPE INST 3X3IN 2MM BX/20

## (undated) DEVICE — DESTINATION PERIPHERAL GUIDING SHEATH: Brand: DESTINATION

## (undated) DEVICE — GLV SURG SENSICARE PI MIC PF SZ7.5 LF STRL

## (undated) DEVICE — PTA BALLOON DILATATION CATHETER: Brand: STERLING® SL

## (undated) DEVICE — SUT PROLN 5/0 C1 D/A 36IN 8720H

## (undated) DEVICE — PERCLOSE PROGLIDE™ SUTURE-MEDIATED CLOSURE SYSTEM: Brand: PERCLOSE PROGLIDE™

## (undated) DEVICE — PTA BALLOON DILATATION CATHETER: Brand: MUSTANG™

## (undated) DEVICE — Device: Brand: QUICK-CROSS SUPPORT CATHETER

## (undated) DEVICE — KT VALVULOTOME 2 AND 3MM CUT HD

## (undated) DEVICE — ARMADA 35 LL PTA CATHETER 6 MM X 250 MM X 135 CM / OVER-THE-WIRE: Brand: ARMADA

## (undated) DEVICE — BG TRANSF W/COUPLER SPK 600ML

## (undated) DEVICE — CATH GUIDE SOFTVU FLUSH HT PIG .035 5F 65CM

## (undated) DEVICE — GLV SURG SIGNATURE ESSENTIAL PF LTX SZ7.5

## (undated) DEVICE — RADIFOCUS TORQUE DEVICE MULTI-TORQUE VISE: Brand: RADIFOCUS TORQUE DEVICE

## (undated) DEVICE — HI-TORQUE SPARTACORE 14 PERIPHERAL GUIDE WIRE .014 5.0 CM X 300 CM: Brand: SPARTACORE

## (undated) DEVICE — ST ACC MICROPUNCTURE STFF .018 ECHO/PLAT/TP 4F/10CM 21G/7CM

## (undated) DEVICE — PK ANGIO 40

## (undated) DEVICE — BALN DIL ADVANCE PTA LP 4F 2.5X12 170CM BX/1EA

## (undated) DEVICE — ST ACC MICROPUNCTURE STFF .018 ECHO/PLDM/TP 4F/10CM 21G/7CM

## (undated) DEVICE — BLOOD COLLECTION/INFUSION SET,FEMALE LUER, 12 INCH (30.5 CM) TUBING: Brand: MONOJECT

## (undated) DEVICE — CONQUEST® PTA BALLOON DILATATION CATHETER 6 MM X 40 MM, 75 CM CATHETER: Brand: CONQUEST®

## (undated) DEVICE — EXTENSION SET, MALE LUER LOCK ADAPTER WITH RETRACTABLE COLLAR

## (undated) DEVICE — SNAR E/S POLYP SNAREMASTER OVL/10MM 2.8X2300MM YEL

## (undated) DEVICE — VESSEL LOOPS X-RAY DETECTABLE: Brand: DEROYAL

## (undated) DEVICE — ANTIBACTERIAL UNDYED BRAIDED (POLYGLACTIN 910), SYNTHETIC ABSORBABLE SUTURE: Brand: COATED VICRYL

## (undated) DEVICE — TUBING, SUCTION, 1/4" X 20', STRAIGHT: Brand: MEDLINE INDUSTRIES, INC.

## (undated) DEVICE — STPCK 3/WY HP M/RA W/OFF/HNDL 1050PSI STRL

## (undated) DEVICE — CATH IV INSYTE AUTOGARD 14G 1 1/2IN ORNG

## (undated) DEVICE — SYR LL TP 10ML STRL

## (undated) DEVICE — KT CATH INDIGO W/TBG/ASP STR/TP 6F 135CM 2MM

## (undated) DEVICE — SUT SILK 4/0 TIES 18IN A183H

## (undated) DEVICE — INFLATION DEVICE: Brand: ENCORE™ 26

## (undated) DEVICE — PENCL ES MEGADINE EZ/CLEAN BUTN W/HOLSTR 10FT

## (undated) DEVICE — ST ACC MICROPUNCTURE .018 TRANSLSS/SS/TP 5F/10CM 21G/7CM

## (undated) DEVICE — THE STERILE LIGHT HANDLE COVER IS USED WITH STERIS SURGICAL LIGHTING AND VISUALIZATION SYSTEMS.

## (undated) DEVICE — STPLR SKIN VISISTAT WD 35CT

## (undated) DEVICE — ST IV INFUS ALARIS PUMP MODULE 2PC M/LL 23ML 109IN

## (undated) DEVICE — PK ATS CUST W CARDIOTOMY RESEVOIR

## (undated) DEVICE — BG ISOL DRWSTR INVISISHIELD 20X20IN

## (undated) DEVICE — SUT PROLN 6/0 C1 D/A 30IN 8706H

## (undated) DEVICE — SOLIDIFIER LIQLOC PLS 1500CC BT

## (undated) DEVICE — SUT VIC 3/0 CTI 36IN J944H

## (undated) DEVICE — THE SINGLE USE ETRAP – POLYP TRAP IS USED FOR SUCTION RETRIEVAL OF ENDOSCOPICALLY REMOVED POLYPS.: Brand: ETRAP

## (undated) DEVICE — STPCK 3WY D201 DISCOFIX

## (undated) DEVICE — CONQUEST® PTA BALLOON DILATATION CATHETER 5 MM X 80 MM, 75 CM CATHETER: Brand: CONQUEST®

## (undated) DEVICE — SUT SILK 3/0 TIES 18IN A184H

## (undated) DEVICE — CATH IMG IVUS EAGLE EYE PLATIN RX DIGITAL .014IN 5FR

## (undated) DEVICE — SOL NS 500ML

## (undated) DEVICE — SYS PERFUS SEP PLATLT W TIPS CUST

## (undated) DEVICE — PATIENT RETURN ELECTRODE, SINGLE-USE, CONTACT QUALITY MONITORING, ADULT, WITH 9FT CORD, FOR PATIENTS WEIGING OVER 33LBS. (15KG): Brand: MEGADYNE

## (undated) DEVICE — SUT SILK 2/0 TIES 18IN A185H

## (undated) DEVICE — Device: Brand: D-STAT® DRY SILVER CLEAR TOPICAL HEMOSTAT

## (undated) DEVICE — SYR LL 3CC

## (undated) DEVICE — SOL IRRG H2O PL/BG 1000ML STRL

## (undated) DEVICE — KT CATH ASP INDIGO LIGHTNING HTORQ/TP 7F 130CM

## (undated) DEVICE — NDL PERC 1PRT THNWALL W/BASEPLT 18G 7CM